# Patient Record
Sex: MALE | Race: WHITE | NOT HISPANIC OR LATINO | Employment: OTHER | URBAN - METROPOLITAN AREA
[De-identification: names, ages, dates, MRNs, and addresses within clinical notes are randomized per-mention and may not be internally consistent; named-entity substitution may affect disease eponyms.]

---

## 2017-03-31 RX ORDER — LIOTHYRONINE SODIUM 5 UG/1
25 TABLET ORAL DAILY
Qty: 30 TABLET | Refills: 2 | Status: SHIPPED | OUTPATIENT
Start: 2017-03-31 | End: 2017-07-12 | Stop reason: SDUPTHER

## 2017-03-31 RX ORDER — AMIODARONE HYDROCHLORIDE 200 MG/1
TABLET ORAL
Qty: 30 TABLET | Refills: 1 | OUTPATIENT
Start: 2017-03-31

## 2017-03-31 RX ORDER — LEVOTHYROXINE SODIUM 50 UG/1
50 TABLET ORAL DAILY
Qty: 30 TABLET | Refills: 1 | Status: SHIPPED | OUTPATIENT
Start: 2017-03-31 | End: 2017-06-14 | Stop reason: SDUPTHER

## 2017-03-31 NOTE — TELEPHONE ENCOUNTER
notify patient that his levothyroxine and Cytomel were sent in; will need to get his amiodarone however from his cardiologist as we don't prescribe that medication; he needs of schedule a follow-up appointment in the [facility] to get established here with us

## 2017-03-31 NOTE — TELEPHONE ENCOUNTER
----- Message from Jovita Figueroa sent at 3/31/2017 11:06 AM CDT -----  Contact: Carolin Mitchell  Wife called regarding a medication for the patient hasn't been sent to [facility]. Please contact [phone]

## 2017-04-03 NOTE — TELEPHONE ENCOUNTER
Spoke with pt and informed him that he needs to contact his Cardiologist ( Dr. Chase ) to get refill of Amiodarone.

## 2017-04-06 RX ORDER — METOPROLOL TARTRATE 25 MG/1
25 TABLET, FILM COATED ORAL DAILY
COMMUNITY
End: 2017-09-22 | Stop reason: SDUPTHER

## 2017-04-06 RX ORDER — CLOPIDOGREL BISULFATE 75 MG/1
75 TABLET ORAL DAILY
COMMUNITY

## 2017-04-06 RX ORDER — ASPIRIN 81 MG/1
243 TABLET ORAL DAILY
COMMUNITY
End: 2020-07-23 | Stop reason: ALTCHOICE

## 2017-04-06 RX ORDER — VALSARTAN 40 MG/1
40 TABLET ORAL DAILY
COMMUNITY
End: 2018-10-20

## 2017-04-06 RX ORDER — TAMSULOSIN HYDROCHLORIDE 0.4 MG/1
0.4 CAPSULE ORAL DAILY
COMMUNITY
End: 2017-06-21

## 2017-04-06 RX ORDER — SPIRONOLACTONE AND HYDROCHLOROTHIAZIDE 25; 25 MG/1; MG/1
0.5 TABLET ORAL DAILY
COMMUNITY
End: 2017-06-21

## 2017-04-06 RX ORDER — ATORVASTATIN CALCIUM 40 MG/1
40 TABLET, FILM COATED ORAL
COMMUNITY
End: 2021-03-25 | Stop reason: SDUPTHER

## 2017-04-06 RX ORDER — MAG HYDROX/ALUMINUM HYD/SIMETH 400-400-40
1 SUSPENSION, ORAL (FINAL DOSE FORM) ORAL 2 TIMES DAILY
COMMUNITY
End: 2017-06-21

## 2017-04-06 RX ORDER — ISOSORBIDE MONONITRATE 30 MG/1
15 TABLET, EXTENDED RELEASE ORAL DAILY
COMMUNITY
End: 2023-11-15

## 2017-04-06 RX ORDER — FOLIC ACID 0.8 MG
800 TABLET ORAL 2 TIMES DAILY
COMMUNITY

## 2017-04-06 RX ORDER — AMIODARONE HYDROCHLORIDE 200 MG/1
TABLET ORAL
COMMUNITY

## 2017-04-06 RX ORDER — ACETAMINOPHEN 500 MG
2000 TABLET ORAL EVERY OTHER DAY
COMMUNITY

## 2017-04-11 ENCOUNTER — PATIENT OUTREACH (OUTPATIENT)
Dept: ADMINISTRATIVE | Facility: HOSPITAL | Age: 82
End: 2017-04-11

## 2017-04-11 NOTE — LETTER
April 11, 2017    Shashi Hassan  [address]  [place] [address]             [facility]  [address]  [facility] [address]  Phone: [phone] Dear Mr. Hassan:    [facility] is committed to your overall health.  To help you get the most out of each of your visits, we will review your information to make sure you are up to date on all of your recommended tests and/or procedures.      Dr. Mitchell Mota is a new provider to us and we may not have your complete medical records on file here at [facility].  We have requested his records from the [facility].  The records we have received so far show that you may be due for your fasting cholesterol labs and possibly some immunizations (pneumonia, tetanus, and shingles).     If you have had any of the above done at another facility, please bring the records or information with you so that your record at [facility] will be complete.    If you are currently taking medication, please bring it with you to your appointment for review.    Also, if you have any type of Advanced Directives, please bring them with you to your office visit so we may scan them into your chart.    If you have any questions or concerns, please don't hesitate to call.    Thank you for letting us care for you,  Radha Bailey LPN Clinical Care Coordinator  [facility] [place]  [phone]

## 2017-05-05 ENCOUNTER — TELEPHONE (OUTPATIENT)
Dept: FAMILY MEDICINE | Facility: CLINIC | Age: 82
End: 2017-05-05

## 2017-05-05 NOTE — TELEPHONE ENCOUNTER
----- Message from Tamie Tabor sent at 5/5/2017 11:15 AM CDT -----  Contact: [employer]-   [phone]  Patient had ordered labs for ohmocysteine on 11/11/2016. The test was denied by medicare due to diagnosis code, the caller asking for new diagnosis code for the test.Thanks!

## 2017-05-10 NOTE — TELEPHONE ENCOUNTER
Please notify patient that Homocystine level was checked on the patient for elevated homocystine or homocystinemia; please have him call area code [phone] for further assistance, as I don't have any involvement with regards to the billing performed by the lab

## 2017-05-11 NOTE — TELEPHONE ENCOUNTER
Spoke with pt and informed him that he can come stop by the office and  a script with new dx to turn into [facility] and have them re-bill DOS 11/11/2016. Pt will come by tomorrow 05/12/2017.

## 2017-06-13 ENCOUNTER — TELEPHONE (OUTPATIENT)
Dept: FAMILY MEDICINE | Facility: CLINIC | Age: 82
End: 2017-06-13

## 2017-06-13 DIAGNOSIS — E03.9 HYPOTHYROIDISM, UNSPECIFIED TYPE: Primary | ICD-10-CM

## 2017-06-13 NOTE — TELEPHONE ENCOUNTER
----- Message from Ilsa Delgado sent at 6/13/2017  2:54 PM CDT -----  Contact: Wife  Amy, wife [phone], Calling for a refill of Rx levothyroxine (SYNTHROID) [facility] [address] . Patient is currently out of the medication.    [id number] [place] [address]  Phone: [phone] Fax: [phone]    Please advise. Thanks.

## 2017-06-14 RX ORDER — LEVOTHYROXINE SODIUM 50 UG/1
50 TABLET ORAL DAILY
Qty: 30 TABLET | Refills: 1 | Status: SHIPPED | OUTPATIENT
Start: 2017-06-14 | End: 2018-04-04 | Stop reason: SDUPTHER

## 2017-06-14 NOTE — TELEPHONE ENCOUNTER
Notify patient we sent in levothyroxine 50 µg daily but he needs to schedule appointment to get established and to be seen in [facility]

## 2017-06-14 NOTE — TELEPHONE ENCOUNTER
Pt is scheduled for follow up to get established at [facility] office on 06/21/2017.

## 2017-06-21 ENCOUNTER — OFFICE VISIT (OUTPATIENT)
Dept: FAMILY MEDICINE | Facility: CLINIC | Age: 82
End: 2017-06-21
Payer: MEDICARE

## 2017-06-21 VITALS
HEIGHT: 61 IN | DIASTOLIC BLOOD PRESSURE: 80 MMHG | BODY MASS INDEX: 33.36 KG/M2 | SYSTOLIC BLOOD PRESSURE: 126 MMHG | WEIGHT: 176.69 LBS | HEART RATE: 60 BPM | TEMPERATURE: 98 F

## 2017-06-21 DIAGNOSIS — I10 BENIGN ESSENTIAL HYPERTENSION: ICD-10-CM

## 2017-06-21 DIAGNOSIS — Z86.010 HX OF COLONIC POLYPS: ICD-10-CM

## 2017-06-21 DIAGNOSIS — Z95.5 S/P CORONARY ARTERY STENT PLACEMENT: ICD-10-CM

## 2017-06-21 DIAGNOSIS — N18.30 CKD (CHRONIC KIDNEY DISEASE), STAGE III: ICD-10-CM

## 2017-06-21 DIAGNOSIS — I47.20 VENTRICULAR TACHYCARDIA: ICD-10-CM

## 2017-06-21 DIAGNOSIS — E66.9 OBESITY (BMI 30.0-34.9): ICD-10-CM

## 2017-06-21 DIAGNOSIS — Z80.0 FAMILY HISTORY OF COLON CANCER: ICD-10-CM

## 2017-06-21 DIAGNOSIS — E78.2 MIXED HYPERLIPIDEMIA: Primary | ICD-10-CM

## 2017-06-21 DIAGNOSIS — Z95.1 S/P CABG X 3: ICD-10-CM

## 2017-06-21 DIAGNOSIS — E03.9 HYPOTHYROIDISM, UNSPECIFIED TYPE: ICD-10-CM

## 2017-06-21 DIAGNOSIS — R06.09 DOE (DYSPNEA ON EXERTION): ICD-10-CM

## 2017-06-21 PROBLEM — E66.811 OBESITY (BMI 30.0-34.9): Status: ACTIVE | Noted: 2017-06-21

## 2017-06-21 PROCEDURE — 1159F MED LIST DOCD IN RCRD: CPT | Mod: ,,, | Performed by: INTERNAL MEDICINE

## 2017-06-21 PROCEDURE — 99213 OFFICE O/P EST LOW 20 MIN: CPT | Mod: PBBFAC,PO | Performed by: INTERNAL MEDICINE

## 2017-06-21 PROCEDURE — 1126F AMNT PAIN NOTED NONE PRSNT: CPT | Mod: ,,, | Performed by: INTERNAL MEDICINE

## 2017-06-21 PROCEDURE — 99215 OFFICE O/P EST HI 40 MIN: CPT | Mod: S$PBB,,, | Performed by: INTERNAL MEDICINE

## 2017-06-21 PROCEDURE — 99999 PR PBB SHADOW E&M-EST. PATIENT-LVL III: CPT | Mod: PBBFAC,,, | Performed by: INTERNAL MEDICINE

## 2017-06-21 RX ORDER — AA/PROT/LYSINE/METHIO/VIT C/B6 50-12.5 MG
100 TABLET ORAL 2 TIMES DAILY
COMMUNITY
End: 2021-06-08 | Stop reason: DRUGHIGH

## 2017-06-21 NOTE — PROGRESS NOTES
Subjective:       Patient ID: Shashi Hassan is a 85 y.o. male.    Chief Complaint: Establish Care    HPI Pt is a  very pleasant 85-year-old gentleman established patient of mine here to get re-established with me at [facility]; please see last documented progress note available from [facility] clinic by me on 2/24/17; overall patient is doing better; his cardiologist is Dr. Garnica he had 2 coronary stents placed in the beginning of January to help his dyspnea on exertion; he saw some improvement but then subsequently wanted a third stent to be placed which after being placed showed a significant amount of improvement in his dyspnea on exertion after that; apparently the earlier 2 stents included the PDA and RCA; with third one including the  LAD; he also has a history of coronary artery bypass graft [facility] [place] 01/01/2009.  Running diagnosis's from the last office appointment included: Hyperlipidemia, history of ventricular tachycardia, essential hypertension, hypothyroidism, and chronic kidney disease stage III.  Patient's past medical history and surgical medical history were documented.  Social medical history and family medical history were obtained and delineated.  Review of systems was obtained at length, prior to physical exam being performed; appropriate labs ordered in follow-up.  Labs were also reviewed from 2/10/17 performed by [facility].  History was recently 1.0 with Dr. Osorio his urologist who he sees every 6 months.  Stool for globin was -5/14/16 of note    Review of Systems   Constitutional: Negative for appetite change, fever and unexpected weight change.   HENT: Negative for congestion, postnasal drip, rhinorrhea and sinus pressure.    Eyes: Negative for discharge and itching.   Respiratory: Positive for shortness of breath. Negative for cough, chest tightness and wheezing.         Markedly improved w coronary stents. In cardiac rehab now.  "  Cardiovascular: Negative for chest pain, palpitations and leg swelling.   Gastrointestinal: Negative for abdominal pain, blood in stool, constipation, diarrhea, nausea and vomiting.   Endocrine: Positive for polydipsia. Negative for polyphagia and polyuria.   Genitourinary: Negative for dysuria, hematuria and urgency.        BPH; nocturia 2x a night.   Musculoskeletal: Negative for arthralgias and myalgias.   Skin: Negative for rash.   Allergic/Immunologic: Negative for environmental allergies, food allergies and immunocompromised state.   Neurological: Negative for tremors, seizures and headaches.   Hematological: Negative for adenopathy. Does not bruise/bleed easily.   Psychiatric/Behavioral:        Denies anxiety or depression.       Objective:      Vitals:    06/21/17 1003   BP: 126/80   BP Location: Left arm   Patient Position: Sitting   BP Method: Manual   Pulse: 60   Temp: 98.2 °F (36.8 °C)   TempSrc: Oral   Weight: 80.2 kg (176 lb 11.2 oz)   Height: 5' 1" (1.549 m)     Body mass index is 33.39 kg/m².    Physical Exam   Constitutional: He is oriented to person, place, and time. He appears well-developed and well-nourished.   HENT:   Head: Normocephalic and atraumatic.   Eyes: EOM are normal.   Neck: Normal range of motion. Neck supple. No thyromegaly present.   No carotid bruits heard.   Cardiovascular: Normal rate, regular rhythm and normal heart sounds.  Exam reveals no gallop.    No murmur heard.  Pulmonary/Chest: Effort normal. No respiratory distress. He has no wheezes. He has no rales.   Slight reduced micaela BS's./ CTA     Abdominal: Soft. Bowel sounds are normal. He exhibits no distension. There is no tenderness. There is no rebound and no guarding.   Musculoskeletal: Normal range of motion. He exhibits edema.   1-2+ micaela LE edema; no calf tenderness to palp. Spider veins noted.   Lymphadenopathy:     He has no cervical adenopathy.   Neurological: He is alert and oriented to person, place, and time. "   Moves all 4 extremities fine.   Skin: No rash noted.   Psychiatric: He has a normal mood and affect. His behavior is normal. Thought content normal.   Vitals reviewed.      Assessment:       1. Mixed hyperlipidemia    2. Benign essential hypertension    3. CKD (chronic kidney disease), stage III    4. Hypothyroidism, unspecified type    5. S/P CABG x 3    6. S/P coronary artery stent placement    7. WESTFALL (dyspnea on exertion)    8. Ventricular tachycardia    9. Obesity (BMI 30.0-34.9)    10. Hx of colonic polyps        Plan:       Mixed hyperlipidemia. Maintain low fat high fiber diet, exercise regularly.  Continue atorvastatin dosing and omega-3 fatty acid    Benign essential hypertension. Maintain < 2 Gm Na a day diet, and monitor BP at home; keep a log.  On valsartan and metoprolol tartrate and isosorbide  -     CBC auto differential; Future; Expected date: 06/21/2017  -     Comprehensive metabolic panel; Future; Expected date: 06/21/2017  -     Urinalysis; Future; Expected date: 06/21/2017  -     Magnesium; Future; Expected date: 06/21/2017    CKD (chronic kidney disease), stage III; min 6-7 glasses of fluid a day. No NSAID's.  -     CBC auto differential; Future; Expected date: 06/21/2017  -     Comprehensive metabolic panel; Future; Expected date: 06/21/2017  -     Urinalysis; Future; Expected date: 06/21/2017    Hypothyroidism, unspecified type; thyroid function test before follow-up; on levothyroxine and Cytomel; patient is on amiodarone  -     TSH; Future; Expected date: 06/21/2017  -     T4, free; Future; Expected date: 06/21/2017  -     T3, free; Future; Expected date: 06/21/2017    S/P CABG x 3; performed at [facility] 1/1/2009    S/P coronary artery stent placement; marked improvement of patient's dyspnea on exertion with stenting of the LAD apparently in April, 2017; patient earlier in January had 2 coronary stents placed which showed only minor improvement in his WESTFALL    Ventricular  tachycardia; on amiodarone per cardiology; on amiodarone and metoprolol  -     Magnesium; Future; Expected date: 06/21/2017    Obesity (BMI 30.0-34.9); caloric restriction with regular exercise and weight reduction    History of colon polyps.  Last total colonoscopy July 2005; polyps removed at that time; 10 year  follow-up was recommended; due 2015; patient's mom with a history of colon cancer of note.  Requested a phone call to patient after review of his records to see if he ever had a colonoscopy performed in 2015 or since that time; it stool for occult blood updated

## 2017-06-21 NOTE — PATIENT INSTRUCTIONS
ST for his labs; obtain labs 10 days before f/u; overnight fast before labs are drawn.  Exercise w weight reduction. Cardiac rehab as per cardiology.

## 2017-06-22 RX ORDER — AMIODARONE HYDROCHLORIDE 200 MG/1
100 TABLET ORAL DAILY
Qty: 90 TABLET | Refills: 1 | OUTPATIENT
Start: 2017-06-22

## 2017-06-22 NOTE — TELEPHONE ENCOUNTER
Spoke with both patient and his wife. Let them both know that Dr. Mota stated that patients cardiologist is to fill his amiodarone. Wife voiced understanding.

## 2017-06-25 ENCOUNTER — TELEPHONE (OUTPATIENT)
Dept: FAMILY MEDICINE | Facility: CLINIC | Age: 82
End: 2017-06-25

## 2017-06-25 PROBLEM — Z86.010 HX OF COLONIC POLYPS: Status: ACTIVE | Noted: 2017-06-25

## 2017-06-25 PROBLEM — E78.2 MIXED HYPERLIPIDEMIA: Status: ACTIVE | Noted: 2017-06-25

## 2017-06-25 PROBLEM — Z86.0100 HX OF COLONIC POLYPS: Status: ACTIVE | Noted: 2017-06-25

## 2017-06-25 NOTE — TELEPHONE ENCOUNTER
Notify patient review of records shows that his last colonoscopy was July 2005; some polyps were removed at that time; a 10 year follow-up was recommended which would've been 2015; his mom remind him has a history of colon cancer; ask him if he has gotten his total colonoscopy in 2015 or is it still do..  Also in addition to his labs he needs to get stool for occult blood and updated

## 2017-06-26 NOTE — TELEPHONE ENCOUNTER
Spoke with pt and hes not gotten a colonoscopy was July 2005 and is aware that he does need to get updated colonoscopy.

## 2017-07-12 RX ORDER — LIOTHYRONINE SODIUM 5 UG/1
TABLET ORAL
Qty: 30 TABLET | Refills: 2 | Status: SHIPPED | OUTPATIENT
Start: 2017-07-12 | End: 2017-10-11 | Stop reason: SDUPTHER

## 2017-07-20 ENCOUNTER — PATIENT OUTREACH (OUTPATIENT)
Dept: ADMINISTRATIVE | Facility: HOSPITAL | Age: 82
End: 2017-07-20

## 2017-07-20 NOTE — LETTER
July 20, 2017    Shashi LIANG Sonya  [address]  [place] [address]             [facility]  [address]  [facility] [address]  Phone: [phone] Dear Mr. Hassan:    [facility] is committed to your overall health.  To help you get the most out of each of your visits, we will review your information to make sure you are up to date on all of your recommended tests and/or procedures.      Dr. Mitchell Mota has found that you may be due for your fasting cholesterol labs and possibly some immunizations (shingles, tetanus, and pneumonia).     Medicare does not cover all immunizations to be given in the clinic.  Check your benefits to ensure that you do not need to receive your immunizations at the pharmacy.    If you have had any of the above done at another facility, please bring the records or information with you so that your record at [facility] will be complete.  If you would like to schedule any of these, please contact me.    If you are currently taking medication, please bring it with you to your appointment for review.    Also, if you have any type of Advanced Directives, please bring them with you to your office visit so we may scan them into your chart.    If you have any questions or concerns, please don't hesitate to call.    Thank you for letting us care for you,  Radha Bailey LPN Clinical Care Coordinator  [facility]  [phone]

## 2017-09-08 ENCOUNTER — PATIENT OUTREACH (OUTPATIENT)
Dept: ADMINISTRATIVE | Facility: HOSPITAL | Age: 82
End: 2017-09-08

## 2017-09-08 NOTE — LETTER
September 8, 2017    Shashi LIANG Sonya  [address]  [place] [address]             [facility]  [address]  [facility] [address]  Phone: [phone] Dear Mr. Hassan:    [facility] committed to your overall health.  To help you get the most out of each of your visits, we will review your information to make sure you are up to date on all of your recommended tests and/or procedures.      Dr. Mitchell Mota has found that you may be due for your fasting cholesterol labs and possibly some immunizations (shingles, tetanus, and pneumonia).     Medicare does not cover all immunizations to be given in the clinic.  Check your benefits to ensure that you do not need to receive your immunizations at the pharmacy.    If you have had any of the above done at another facility, please bring the records or information with you so that your record at [facility] will be complete.  If you would like to schedule any of these, please contact me.    If you are currently taking medication, please bring it with you to your appointment for review.    Also, if you have any type of Advanced Directives, please bring them with you to your office visit so we may scan them into your chart.    If you have any questions or concerns, please don't hesitate to call.    Thank you for letting us care for you,  Radha Bailey LPN Clinical Care Coordinator  [facility] [place]  [phone]

## 2017-09-22 ENCOUNTER — OFFICE VISIT (OUTPATIENT)
Dept: FAMILY MEDICINE | Facility: CLINIC | Age: 82
End: 2017-09-22
Payer: MEDICARE

## 2017-09-22 VITALS
TEMPERATURE: 98 F | HEIGHT: 61 IN | OXYGEN SATURATION: 96 % | DIASTOLIC BLOOD PRESSURE: 75 MMHG | HEART RATE: 65 BPM | SYSTOLIC BLOOD PRESSURE: 120 MMHG | BODY MASS INDEX: 33.59 KG/M2 | WEIGHT: 177.94 LBS

## 2017-09-22 DIAGNOSIS — E78.2 MIXED HYPERLIPIDEMIA: Primary | ICD-10-CM

## 2017-09-22 DIAGNOSIS — E03.9 HYPOTHYROIDISM, UNSPECIFIED TYPE: ICD-10-CM

## 2017-09-22 DIAGNOSIS — I11.9 BENIGN HYPERTENSIVE HEART DISEASE WITHOUT HEART FAILURE: ICD-10-CM

## 2017-09-22 DIAGNOSIS — Z95.5 S/P CORONARY ARTERY STENT PLACEMENT: ICD-10-CM

## 2017-09-22 DIAGNOSIS — E66.9 OBESITY (BMI 30.0-34.9): ICD-10-CM

## 2017-09-22 DIAGNOSIS — Z95.1 S/P CABG X 3: ICD-10-CM

## 2017-09-22 DIAGNOSIS — Z23 FLU VACCINE NEED: ICD-10-CM

## 2017-09-22 DIAGNOSIS — N18.30 CKD (CHRONIC KIDNEY DISEASE), STAGE III: ICD-10-CM

## 2017-09-22 DIAGNOSIS — Z86.010 HX OF COLONIC POLYPS: ICD-10-CM

## 2017-09-22 PROCEDURE — 99214 OFFICE O/P EST MOD 30 MIN: CPT | Mod: PBBFAC,PN | Performed by: INTERNAL MEDICINE

## 2017-09-22 PROCEDURE — G0008 ADMIN INFLUENZA VIRUS VAC: HCPCS | Mod: PBBFAC,PN

## 2017-09-22 PROCEDURE — 1126F AMNT PAIN NOTED NONE PRSNT: CPT | Mod: ,,, | Performed by: INTERNAL MEDICINE

## 2017-09-22 PROCEDURE — 3074F SYST BP LT 130 MM HG: CPT | Mod: ,,, | Performed by: INTERNAL MEDICINE

## 2017-09-22 PROCEDURE — 99214 OFFICE O/P EST MOD 30 MIN: CPT | Mod: S$PBB,,, | Performed by: INTERNAL MEDICINE

## 2017-09-22 PROCEDURE — 3078F DIAST BP <80 MM HG: CPT | Mod: ,,, | Performed by: INTERNAL MEDICINE

## 2017-09-22 PROCEDURE — 1159F MED LIST DOCD IN RCRD: CPT | Mod: ,,, | Performed by: INTERNAL MEDICINE

## 2017-09-22 PROCEDURE — 99999 PR PBB SHADOW E&M-EST. PATIENT-LVL IV: CPT | Mod: PBBFAC,,, | Performed by: INTERNAL MEDICINE

## 2017-09-22 RX ORDER — METOPROLOL TARTRATE 25 MG/1
25 TABLET, FILM COATED ORAL DAILY
Qty: 90 TABLET | Refills: 1 | Status: SHIPPED | OUTPATIENT
Start: 2017-09-22 | End: 2018-04-04 | Stop reason: SDUPTHER

## 2017-09-22 NOTE — PATIENT INSTRUCTIONS
Mixed hyperlipidemia. Maintain low fat high fiber diet, exercise regularly. Decrease dairy intake; cont lipitor and fish oil.    Benign hypertensive heart disease without heart failure. Maintain < 2 Gm Na a day diet, and monitor BP at home; keep a log.     On valsartan, and metoprolol.    CKD (chronic kidney disease), stage III. Try to stay well hydrated; labs pending.    S/P CABG x 3; sees Dr Chase. Has been improved w WESTFALL since last stent.    S/P coronary artery stent placement    Hypothyroidism, unspecified type; levels TFT pending    Obesity (BMI 30.0-34.9)Caloric restriction w regular exercise and weight reduction.    Hx of colonic polyps; last TC 7/2005; few benign polyps removed; 10 yr f/u. Past due. With recent coronary stenting though will need to wait  at least 6 mos from April, and be approved by cardiology for timing thereafter. Needs iFOBT.    Needs to obtain labs from [facility]; overnight fast prior to labs; schedule follow up to discuss his labs.

## 2017-09-22 NOTE — PROGRESS NOTES
"Subjective:       Patient ID: Shashi Hassan is a 85 y.o. male.    Chief Complaint: Follow up labs    HPI  He's been doing fine. CAD w 3 stents: April this yr had stent placed, 2 in January; s/p 3V CABG. cardiologist Dr Garnica. No chest pain, SOB, or palp. Does get WESTFALL w a lot exertion; stable.   CKD3: tries to stay well hydrated. HLP: on low fat high fiber diet; tolerates atorvastatin fine. Meds reviewed. Labs discussed w pt and his wife. NMR lipid profile discussed; needs to obtain other labs not done unfortunately in computer. Cont same dosing of lipitor; needs to decrease dairy intake. Cont fish oil.    Review of Systems   Constitutional: Negative for appetite change, fever and unexpected weight change.   HENT: Negative for congestion, postnasal drip, rhinorrhea and sinus pressure.    Eyes: Negative for discharge and itching.   Respiratory: Negative for cough, chest tightness, shortness of breath and wheezing.         WESTFALL w a lot of exertion. Stable.   Cardiovascular: Negative for chest pain, palpitations and leg swelling.   Gastrointestinal: Negative for abdominal pain, blood in stool, constipation, diarrhea, nausea and vomiting.   Endocrine: Negative for polydipsia, polyphagia and polyuria.   Genitourinary: Negative for dysuria and hematuria.   Musculoskeletal: Negative for arthralgias and myalgias.   Skin: Negative for rash.   Allergic/Immunologic: Negative for environmental allergies and food allergies.   Neurological: Negative for tremors, seizures and headaches.   Hematological: Negative for adenopathy. Does not bruise/bleed easily.   Psychiatric/Behavioral:        Denies anxiety or depression.       Objective:      Vitals:    09/22/17 0925   BP: 120/75   BP Location: Left arm   Patient Position: Sitting   BP Method: Medium (Manual)   Pulse: 65   Temp: 97.7 °F (36.5 °C)   TempSrc: Oral   SpO2: 96%   Weight: 80.7 kg (177 lb 14.6 oz)   Height: 5' 1" (1.549 m)     Body mass index is 33.62 kg/m².    Physical " Exam   Constitutional: He is oriented to person, place, and time. He appears well-developed and well-nourished.   HENT:   Head: Normocephalic and atraumatic.   Eyes: EOM are normal.   Neck: Normal range of motion. Neck supple. No thyromegaly present.   No carotid bruits heard; mild/mod decrease ROM;    Cardiovascular: Normal rate, regular rhythm and normal heart sounds.  Exam reveals no gallop.    No murmur heard.  Pulmonary/Chest: Effort normal and breath sounds normal. No respiratory distress. He has no wheezes. He has no rales.   Abdominal: Soft. Bowel sounds are normal. He exhibits no distension. There is no tenderness. There is no rebound and no guarding.   Musculoskeletal: Normal range of motion. He exhibits edema.   LLE 1-2+; RLE tr-1+ edema; spider veins; no calf tenderness to palp.   Lymphadenopathy:     He has no cervical adenopathy.   Neurological: He is alert and oriented to person, place, and time.   Moves all 4 extremities fine.   Skin: No rash noted.   Psychiatric: He has a normal mood and affect. His behavior is normal. Thought content normal.   Vitals reviewed.      Assessment:       1. Mixed hyperlipidemia    2. Benign hypertensive heart disease without heart failure    3. CKD (chronic kidney disease), stage III    4. S/P CABG x 3    5. S/P coronary artery stent placement    6. Hypothyroidism, unspecified type    7. Obesity (BMI 30.0-34.9)    8. Hx of colonic polyps        Plan:       Mixed hyperlipidemia. Maintain low fat high fiber diet, exercise regularly. Decrease dairy intake; cont lipitor and fish oil.    Benign hypertensive heart disease without heart failure. Maintain < 2 Gm Na a day diet, and monitor BP at home; keep a log.     On valsartan, and metoprolol.    CKD (chronic kidney disease), stage III. Try to stay well hydrated; labs pending.    S/P CABG x 3; sees Dr Chase. Has been improved w WESTFALL since last stent.    S/P coronary artery stent placement    Hypothyroidism, unspecified type;  levels TFT pending    Obesity (BMI 30.0-34.9)Caloric restriction w regular exercise and weight reduction.    Hx of colonic polyps; last TC 7/2005; few benign polyps removed; 10 yr f/u. Past due. With recent coronary stenting though will need to wait  at least 6 mos from April, and be approved by cardiology for timing thereafter. Needs iFOBT.

## 2017-09-25 ENCOUNTER — TELEPHONE (OUTPATIENT)
Dept: FAMILY MEDICINE | Facility: CLINIC | Age: 82
End: 2017-09-25

## 2017-09-25 NOTE — TELEPHONE ENCOUNTER
----- Message from Kelly Cline sent at 9/25/2017 10:23 AM CDT -----  Contact: pt  Pt states would like to speak to nurse Altamirano concerning blood work done this morning...[phone]

## 2017-10-04 ENCOUNTER — PATIENT OUTREACH (OUTPATIENT)
Dept: ADMINISTRATIVE | Facility: HOSPITAL | Age: 82
End: 2017-10-04

## 2017-10-04 NOTE — LETTER
October 4, 2017    Shashi LIANG Sonya  [address]  [place] [address]             [facility]  [address]  [facility] [address]  Phone: [phone] Dear Mr. Hassan:    [facility] committed to your overall health.  To help you get the most out of each of your visits, we will review your information to make sure you are up to date on all of your recommended tests and/or procedures.      Dr. Mitchell Mota has found that your chart shows you may be due for your fasting cholesterol labs and possibly some immunizations (shingles, tetanus, and pneumonia).     Medicare does not cover all immunizations to be given in the clinic.  Check your benefits to ensure that you do not need to receive your immunizations at the pharmacy.    If you have had any of the above done at another facility, please bring the records or information with you so that your record at [facility] will be complete.  If you would like to schedule any of these, please contact me.    If you are currently taking medication, please bring it with you to your appointment for review.    Also, if you have any type of Advanced Directives, please bring them with you to your office visit so we may scan them into your chart.    If you have any questions or concerns, please don't hesitate to call.    Thank you for letting us care for you,  Radha Bailey LPN Clinical Care Coordinator  [facility] [place]  [phone]

## 2017-10-11 DIAGNOSIS — E03.9 HYPOTHYROIDISM, UNSPECIFIED TYPE: ICD-10-CM

## 2017-10-12 RX ORDER — LEVOTHYROXINE SODIUM 50 UG/1
TABLET ORAL
Qty: 30 TABLET | Refills: 2 | Status: SHIPPED | OUTPATIENT
Start: 2017-10-12 | End: 2018-01-30 | Stop reason: SDUPTHER

## 2017-10-12 RX ORDER — LIOTHYRONINE SODIUM 5 UG/1
TABLET ORAL
Qty: 30 TABLET | Refills: 2 | Status: SHIPPED | OUTPATIENT
Start: 2017-10-12 | End: 2018-01-25 | Stop reason: SDUPTHER

## 2017-10-12 NOTE — TELEPHONE ENCOUNTER
----- Message from Ghazal Mccoy sent at 10/12/2017 10:00 AM CDT -----  Contact: wife, Amy  Wife, of Shashi Hassan, [phone] is calling for refills on RX - requesting an early refill due to leaving on a cruise next week.  Rx levothyroxine (SYNTHROID) 50 MCG tablet, liothyronine (CYTOMEL) 5 MCG Tab,   Please advise.  Thanks!    [facility] [address] [place] [address] AT [facility] [address] [address]  Phone: [phone] Fax: [phone]

## 2017-10-17 ENCOUNTER — OFFICE VISIT (OUTPATIENT)
Dept: FAMILY MEDICINE | Facility: CLINIC | Age: 82
End: 2017-10-17
Payer: MEDICARE

## 2017-10-17 VITALS
TEMPERATURE: 98 F | WEIGHT: 177.69 LBS | SYSTOLIC BLOOD PRESSURE: 122 MMHG | HEIGHT: 61 IN | BODY MASS INDEX: 33.55 KG/M2 | OXYGEN SATURATION: 95 % | DIASTOLIC BLOOD PRESSURE: 85 MMHG | HEART RATE: 60 BPM

## 2017-10-17 DIAGNOSIS — E03.9 HYPOTHYROIDISM, UNSPECIFIED TYPE: ICD-10-CM

## 2017-10-17 DIAGNOSIS — R60.0 BILATERAL LEG EDEMA: ICD-10-CM

## 2017-10-17 DIAGNOSIS — Z95.1 S/P CABG X 3: ICD-10-CM

## 2017-10-17 DIAGNOSIS — E78.2 MIXED HYPERLIPIDEMIA: ICD-10-CM

## 2017-10-17 DIAGNOSIS — Z95.5 S/P CORONARY ARTERY STENT PLACEMENT: ICD-10-CM

## 2017-10-17 DIAGNOSIS — E66.9 OBESITY (BMI 30.0-34.9): ICD-10-CM

## 2017-10-17 DIAGNOSIS — I47.20 VENTRICULAR TACHYCARDIA: ICD-10-CM

## 2017-10-17 DIAGNOSIS — I11.9 BENIGN HYPERTENSIVE HEART DISEASE WITHOUT HEART FAILURE: Primary | ICD-10-CM

## 2017-10-17 DIAGNOSIS — N18.30 CKD (CHRONIC KIDNEY DISEASE), STAGE III: ICD-10-CM

## 2017-10-17 PROCEDURE — 99999 PR PBB SHADOW E&M-EST. PATIENT-LVL III: CPT | Mod: PBBFAC,,, | Performed by: INTERNAL MEDICINE

## 2017-10-17 PROCEDURE — 99214 OFFICE O/P EST MOD 30 MIN: CPT | Mod: S$PBB,,, | Performed by: INTERNAL MEDICINE

## 2017-10-17 PROCEDURE — 99213 OFFICE O/P EST LOW 20 MIN: CPT | Mod: PBBFAC,PN | Performed by: INTERNAL MEDICINE

## 2017-10-17 NOTE — PATIENT INSTRUCTIONS
Benign hypertensive heart disease without heart failure. Maintain < 2 Gm Na a day diet, and monitor BP at home; keep a log.     Cont valsartan, imdur, metoprolol.    CKD (chronic kidney disease), stage III. Keep well hydrated; no NSAID agents.    Mixed hyperlipidemia. Maintain low fat high fiber diet, exercise regularly. Cont atorvastain and fish oil.    Hypothyroidism, unspecified type; same thyroid med dosing; check TFT in 6 mos. On amiodarone.    S/P CABG x 3; see Dr Chase. On plavix/ASA, and metoprolo.  S/P coronary artery stent placement    Ventricular tachycardia; has been stable;on amiodarone.    Saroj LE edema; suspect from laura. Insufficiency. Maintain less than 2 g sodium diet; elevate lower extremities at home;        consider use compression stockings if possible.    Obesity (BMI 30.0-34.9). Caloric restriction w regular exercise and weight reduction.    Return to see me in 6 mos w labs prior; overnight fast before his labs are drawn.

## 2017-10-17 NOTE — PROGRESS NOTES
"Subjective:       Patient ID: Shashi Hassan is a 86 y.o. male.    Chief Complaint: Follow up labs    HPI  Overall doing fine; here to go over his labs; preparing for upcoming cruise...HTN: BP avr about the same as here; 122/85; watches his salt intake. CKD3: trying to stay hydrated. Knows not to use NSAID's. Mix HLP: on low fat high fiber diet. Tolerates atorvastatin fine. Takes thyroid meds appropriately; on amiodarone of note; on levothyroxine and cytomel. Hx CAD/CABG/stent x3: no chest pain, SOB, or palp. Card sees Dr Chase. Sees BONNIE Ramos for his PSA, and JAI's.    Review of Systems   Constitutional: Negative for appetite change and unexpected weight change.   HENT: Negative for congestion, postnasal drip and sinus pressure.    Eyes: Negative for discharge and itching.   Respiratory: Negative for cough, chest tightness, shortness of breath and wheezing.    Cardiovascular: Negative for chest pain, palpitations and leg swelling.   Gastrointestinal: Negative for abdominal pain, blood in stool, constipation, diarrhea, nausea and vomiting.   Endocrine: Negative for polydipsia, polyphagia and polyuria.   Genitourinary: Negative for dysuria and hematuria.   Musculoskeletal: Negative for arthralgias and myalgias.   Skin: Negative for rash.   Allergic/Immunologic: Negative for environmental allergies and food allergies.   Neurological: Negative for tremors, seizures and headaches.   Hematological: Negative for adenopathy. Does not bruise/bleed easily.   Psychiatric/Behavioral:        Denies anxiety or depression.       Objective:      Vitals:    10/17/17 0955   BP: 122/85   BP Location: Right arm   Patient Position: Sitting   BP Method: Medium (Manual)   Pulse: 60   Temp: 97.9 °F (36.6 °C)   TempSrc: Oral   SpO2: 95%   Weight: 80.6 kg (177 lb 11.1 oz)   Height: 5' 1" (1.549 m)     Body mass index is 33.57 kg/m².    Physical Exam   Constitutional: He is oriented to person, place, and time. He appears well-developed " and well-nourished.   HENT:   Head: Normocephalic and atraumatic.   Eyes: EOM are normal.   Neck: Normal range of motion. Neck supple. No thyromegaly present.   Decreased ROM neck; supple; no carotid bruits heard.   Cardiovascular: Normal rate, regular rhythm and normal heart sounds.  Exam reveals no gallop.    No murmur heard.  Pulmonary/Chest: Effort normal and breath sounds normal. No respiratory distress. He has no wheezes. He has no rales.   Abdominal: Soft. Bowel sounds are normal. He exhibits no distension. There is no tenderness. There is no rebound and no guarding.   Musculoskeletal: Normal range of motion. He exhibits edema.   2+ micaela LE edema; no calf tenderness to palp. Spider veins noted.   Lymphadenopathy:     He has no cervical adenopathy.   Neurological: He is alert and oriented to person, place, and time.   Moves all 4 extremities fine.   Skin: No rash noted.   Psychiatric: He has a normal mood and affect. His behavior is normal. Thought content normal.   Vitals reviewed.      Assessment:       1. 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 CKD (chronic kidney disease), stage III    3. Mixed hyperlipidemia    4. Hypothyroidism, unspecified type    5. S/P CABG x 3    6. S/P coronary artery stent placement    7. Ventricular tachycardia    8. Bilateral leg edema    9. Obesity (BMI 30.0-34.9)        Plan:       Benign hypertensive heart disease without heart failure. Maintain < 2 Gm Na a day diet, and monitor BP at home; keep a log.     Cont valsartan, imdur, metoprolol.    CKD (chronic kidney disease), stage III. Keep well hydrated; no NSAID agents.    Mixed hyperlipidemia. Maintain low fat high fiber diet, exercise regularly. Cont atorvastain and fish oil.    Hypothyroidism, unspecified type; same thyroid med dosing; check TFT in 6 mos. On amiodarone.    S/P CABG x 3; see Dr Chase. On plavix/ASA, and metoprolo.  S/P coronary artery stent placement    Ventricular tachycardia; has been  stable;on amiodarone.    Saroj LE edema; suspect from laura. Insufficiency. Maintain less than 2 g sodium diet; elevate lower extremities at home;        consider use compression stockings if possible.    Obesity (BMI 30.0-34.9). Caloric restriction w regular exercise and weight reduction.

## 2018-01-25 RX ORDER — LIOTHYRONINE SODIUM 5 UG/1
TABLET ORAL
Qty: 30 TABLET | Refills: 2 | Status: SHIPPED | OUTPATIENT
Start: 2018-01-25 | End: 2018-05-07 | Stop reason: SDUPTHER

## 2018-01-31 RX ORDER — LEVOTHYROXINE SODIUM 50 UG/1
TABLET ORAL
Qty: 30 TABLET | Refills: 2 | Status: SHIPPED | OUTPATIENT
Start: 2018-01-31 | End: 2018-05-14 | Stop reason: SDUPTHER

## 2018-03-21 ENCOUNTER — PATIENT OUTREACH (OUTPATIENT)
Dept: ADMINISTRATIVE | Facility: HOSPITAL | Age: 83
End: 2018-03-21

## 2018-03-21 NOTE — LETTER
March 21, 2018    Shashi Hassan  [address]  [place] [address]             [facility]  [address]  [facility] [address]  Phone: [phone] Dear Mr. Hassan:    [facility] is committed to your overall health.  To help you get the most out of each of your visits, we will review your information to make sure you are up to date on all of your recommended tests and/or procedures.      Dr. Mitchell Mota has found that your chart shows you may be due for shingles, tetanus, and pneumonia immunizations.     Medicare does not cover all immunizations to be given in the clinic.  Check your benefits to ensure that you do not need to receive your immunizations at the pharmacy.    If you have had any of the above done at another facility, please bring the records or information with you so that your record at [facility] will be complete.  If you would like to schedule any of these, please contact me.    If you are currently taking medication, please bring it with you to your appointment for review.    Also, if you have any type of Advanced Directives, please bring them with you to your office visit so we may scan them into your chart.    If you have any questions or concerns, please don't hesitate to call.    Thank you for letting us care for you,  Radha Bailey LPN Clinical Care Coordinator  [facility]  [phone]

## 2018-03-23 ENCOUNTER — LAB VISIT (OUTPATIENT)
Dept: LAB | Facility: HOSPITAL | Age: 83
End: 2018-03-23
Attending: INTERNAL MEDICINE
Payer: MEDICARE

## 2018-03-23 DIAGNOSIS — E03.9 HYPOTHYROIDISM, UNSPECIFIED TYPE: ICD-10-CM

## 2018-03-23 DIAGNOSIS — I11.9 BENIGN HYPERTENSIVE HEART DISEASE WITHOUT HEART FAILURE: ICD-10-CM

## 2018-03-23 DIAGNOSIS — N18.30 CKD (CHRONIC KIDNEY DISEASE), STAGE III: ICD-10-CM

## 2018-03-23 DIAGNOSIS — I47.20 VENTRICULAR TACHYCARDIA: ICD-10-CM

## 2018-03-23 DIAGNOSIS — E78.2 MIXED HYPERLIPIDEMIA: ICD-10-CM

## 2018-03-23 LAB
ALBUMIN SERPL BCP-MCNC: 4 G/DL
ALP SERPL-CCNC: 63 U/L
ALT SERPL W/O P-5'-P-CCNC: 26 U/L
ANION GAP SERPL CALC-SCNC: 7 MMOL/L
AST SERPL-CCNC: 25 U/L
BASOPHILS # BLD AUTO: 0.03 K/UL
BASOPHILS NFR BLD: 0.5 %
BILIRUB SERPL-MCNC: 0.5 MG/DL
BUN SERPL-MCNC: 25 MG/DL
CALCIUM SERPL-MCNC: 9.6 MG/DL
CHLORIDE SERPL-SCNC: 104 MMOL/L
CHOLEST SERPL-MCNC: 118 MG/DL
CHOLEST/HDLC SERPL: 3 {RATIO}
CO2 SERPL-SCNC: 28 MMOL/L
CREAT SERPL-MCNC: 1.6 MG/DL
DIFFERENTIAL METHOD: NORMAL
EOSINOPHIL # BLD AUTO: 0.1 K/UL
EOSINOPHIL NFR BLD: 1.2 %
ERYTHROCYTE [DISTWIDTH] IN BLOOD BY AUTOMATED COUNT: 13 %
EST. GFR  (AFRICAN AMERICAN): 44.4 ML/MIN/1.73 M^2
EST. GFR  (NON AFRICAN AMERICAN): 38.4 ML/MIN/1.73 M^2
GLUCOSE SERPL-MCNC: 105 MG/DL
HCT VFR BLD AUTO: 42.4 %
HDLC SERPL-MCNC: 40 MG/DL
HDLC SERPL: 33.9 %
HGB BLD-MCNC: 14 G/DL
IMM GRANULOCYTES # BLD AUTO: 0.02 K/UL
IMM GRANULOCYTES NFR BLD AUTO: 0.3 %
LDLC SERPL CALC-MCNC: 54.2 MG/DL
LYMPHOCYTES # BLD AUTO: 2.1 K/UL
LYMPHOCYTES NFR BLD: 31.5 %
MAGNESIUM SERPL-MCNC: 2.2 MG/DL
MCH RBC QN AUTO: 29.5 PG
MCHC RBC AUTO-ENTMCNC: 33 G/DL
MCV RBC AUTO: 90 FL
MONOCYTES # BLD AUTO: 0.6 K/UL
MONOCYTES NFR BLD: 8.9 %
NEUTROPHILS # BLD AUTO: 3.8 K/UL
NEUTROPHILS NFR BLD: 57.6 %
NONHDLC SERPL-MCNC: 78 MG/DL
NRBC BLD-RTO: 0 /100 WBC
PLATELET # BLD AUTO: 228 K/UL
PMV BLD AUTO: 10.2 FL
POTASSIUM SERPL-SCNC: 4.7 MMOL/L
PROT SERPL-MCNC: 7.3 G/DL
RBC # BLD AUTO: 4.74 M/UL
SODIUM SERPL-SCNC: 139 MMOL/L
T3FREE SERPL-MCNC: 3.4 PG/ML
T4 FREE SERPL-MCNC: 1.03 NG/DL
TRIGL SERPL-MCNC: 119 MG/DL
TSH SERPL DL<=0.005 MIU/L-ACNC: 1.55 UIU/ML
WBC # BLD AUTO: 6.51 K/UL

## 2018-03-23 PROCEDURE — 85025 COMPLETE CBC W/AUTO DIFF WBC: CPT

## 2018-03-23 PROCEDURE — 80053 COMPREHEN METABOLIC PANEL: CPT

## 2018-03-23 PROCEDURE — 36415 COLL VENOUS BLD VENIPUNCTURE: CPT | Mod: PN

## 2018-03-23 PROCEDURE — 84481 FREE ASSAY (FT-3): CPT

## 2018-03-23 PROCEDURE — 84443 ASSAY THYROID STIM HORMONE: CPT

## 2018-03-23 PROCEDURE — 80061 LIPID PANEL: CPT

## 2018-03-23 PROCEDURE — 84439 ASSAY OF FREE THYROXINE: CPT

## 2018-03-23 PROCEDURE — 83735 ASSAY OF MAGNESIUM: CPT

## 2018-04-04 ENCOUNTER — OFFICE VISIT (OUTPATIENT)
Dept: FAMILY MEDICINE | Facility: CLINIC | Age: 83
End: 2018-04-04
Payer: MEDICARE

## 2018-04-04 VITALS
OXYGEN SATURATION: 96 % | WEIGHT: 179.25 LBS | BODY MASS INDEX: 33.84 KG/M2 | TEMPERATURE: 98 F | DIASTOLIC BLOOD PRESSURE: 80 MMHG | HEART RATE: 62 BPM | HEIGHT: 61 IN | SYSTOLIC BLOOD PRESSURE: 138 MMHG

## 2018-04-04 DIAGNOSIS — E78.2 MIXED HYPERLIPIDEMIA: ICD-10-CM

## 2018-04-04 DIAGNOSIS — Z95.5 S/P CORONARY ARTERY STENT PLACEMENT: ICD-10-CM

## 2018-04-04 DIAGNOSIS — I47.20 VENTRICULAR TACHYCARDIA: ICD-10-CM

## 2018-04-04 DIAGNOSIS — E66.9 OBESITY (BMI 30.0-34.9): ICD-10-CM

## 2018-04-04 DIAGNOSIS — I11.9 BENIGN HYPERTENSIVE HEART DISEASE WITHOUT HEART FAILURE: Primary | ICD-10-CM

## 2018-04-04 DIAGNOSIS — Z95.1 S/P CABG X 3: ICD-10-CM

## 2018-04-04 DIAGNOSIS — E03.9 HYPOTHYROIDISM, UNSPECIFIED TYPE: ICD-10-CM

## 2018-04-04 DIAGNOSIS — N13.8 BENIGN PROSTATIC HYPERPLASIA WITH URINARY OBSTRUCTION: ICD-10-CM

## 2018-04-04 DIAGNOSIS — N18.30 CKD (CHRONIC KIDNEY DISEASE), STAGE III: ICD-10-CM

## 2018-04-04 DIAGNOSIS — N40.1 BENIGN PROSTATIC HYPERPLASIA WITH URINARY OBSTRUCTION: ICD-10-CM

## 2018-04-04 PROCEDURE — 99214 OFFICE O/P EST MOD 30 MIN: CPT | Mod: S$PBB,,, | Performed by: INTERNAL MEDICINE

## 2018-04-04 PROCEDURE — 99999 PR PBB SHADOW E&M-EST. PATIENT-LVL III: CPT | Mod: PBBFAC,,, | Performed by: INTERNAL MEDICINE

## 2018-04-04 PROCEDURE — 99213 OFFICE O/P EST LOW 20 MIN: CPT | Mod: PBBFAC,PN | Performed by: INTERNAL MEDICINE

## 2018-04-04 RX ORDER — METOPROLOL TARTRATE 25 MG/1
25 TABLET, FILM COATED ORAL DAILY
Qty: 90 TABLET | Refills: 1 | Status: SHIPPED | OUTPATIENT
Start: 2018-04-04 | End: 2018-08-27 | Stop reason: SDUPTHER

## 2018-04-04 NOTE — PROGRESS NOTES
"Subjective:       Patient ID: Shashi Hassan is a 86 y.o. male.    Chief Complaint: Follow-up (labs)    HPI  HTN: BP avr at home about the same as here 138/80; on isosorbide, metoprolol, and valsartan. CAD: no chest pain, SOB, or palp. CABG x3 in 2008; total 3 stents placed after on 2 occasions. Card is Dr Chase. Hx of VT; 1 of 2 weeks of heart monitor completed. On amiodarone. HLP: on low fat high fiber diet; on omega-3 FA's, and atorvastatin. Hypothyroid: takes his meds appropriately. Labs discussed w pt and wife at length. Just saw Dr Estephania NICOLE; had JAI w hx of prostate enlargement; 1 yr f/u given. Follows his PSA.     Review of Systems   Constitutional: Negative for appetite change, fatigue and unexpected weight change.   HENT: Negative for congestion, postnasal drip, rhinorrhea and sinus pressure.         Denies seasonal allergies, or perennial allergies   Eyes: Negative for discharge and itching.   Respiratory: Negative for cough, chest tightness, shortness of breath and wheezing.    Cardiovascular: Negative for chest pain, palpitations and leg swelling.   Gastrointestinal: Negative for abdominal pain, blood in stool, constipation, diarrhea, nausea and vomiting.   Endocrine: Negative for polydipsia, polyphagia and polyuria.   Genitourinary: Negative for dysuria and hematuria.   Musculoskeletal: Negative for arthralgias and myalgias.   Skin: Negative for rash.   Allergic/Immunologic: Negative for environmental allergies and food allergies.   Neurological: Negative for tremors, seizures and headaches.   Hematological: Negative for adenopathy. Does not bruise/bleed easily.   Psychiatric/Behavioral:        Denies anxiety or depression.       Objective:      Vitals:    04/04/18 1009   BP: 138/80   BP Location: Left arm   Patient Position: Sitting   BP Method: Medium (Manual)   Pulse: 62   Temp: 98 °F (36.7 °C)   TempSrc: Oral   SpO2: 96%   Weight: 81.3 kg (179 lb 3.7 oz)   Height: 5' 1" (1.549 m)     Body mass " index is 33.87 kg/m².    Physical Exam   Constitutional: He is oriented to person, place, and time. He appears well-developed and well-nourished.   HENT:   Head: Normocephalic and atraumatic.   Eyes: EOM are normal.   Neck: Normal range of motion. Neck supple. No thyromegaly present.   Cardiovascular: Normal rate, regular rhythm and normal heart sounds.  Exam reveals no gallop.    No murmur heard.  Pulmonary/Chest: Effort normal and breath sounds normal. No respiratory distress. He has no wheezes. He has no rales.   Abdominal: Soft. Bowel sounds are normal. He exhibits no distension. There is no tenderness. There is no rebound and no guarding.   Musculoskeletal: Normal range of motion. He exhibits edema.   LLE 2+ edema; RLE 1-2+ edema; spider veins micaela; no calf tenderness to palp.   Lymphadenopathy:     He has no cervical adenopathy.   Neurological: He is alert and oriented to person, place, and time.   Moves all 4 extremities fine.   Skin: No rash noted.   Psychiatric: He has a normal mood and affect. His behavior is normal. Thought content normal.   Vitals reviewed.      Assessment:       1. 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 CKD (chronic kidney disease), stage III    3. Mixed hyperlipidemia    4. Hypothyroidism, unspecified type    5. S/P CABG x 3    6. S/P coronary artery stent placement    7. Ventricular tachycardia    8. Obesity (BMI 30.0-34.9)    9. Benign prostatic hyperplasia with urinary obstruction        Plan:       Benign hypertensive heart disease without heart failure  -     metoprolol tartrate (LOPRESSOR) 25 MG tablet; Take 1 tablet (25 mg total) by mouth once daily.  Dispense: 90 tablet; Refill: 1  -     Ambulatory consult to Nephrology    CKD (chronic kidney disease), stage III; has worsened on f/u; GFR 49 to 38.4; cr 1.32 to 1.6; push fluids; no NSAID agents  -     Ambulatory consult to Nephrology dr JAY Swift.    BPH suspected; sees BONNIE Best; please try to get  results from recent US done by BONNIE in his office. Follows his PSA; f/u 3/2019. On tamsulosin.    Mixed hyperlipidemia. Maintain low fat high fiber diet, exercise regularly. Therapeutic levels. On atorvastatin.     Hypothyroidism, unspecified type; levels therapeutic. TFT in 6 mos. Same dosing.    S/P CABG x 3; sees Dr Chase.   -     metoprolol tartrate (LOPRESSOR) 25 MG tablet; Take 1 tablet (25 mg total) by mouth once daily.  Dispense: 90 tablet; Refill: 1  -     Ambulatory consult to Nephrology    S/P coronary artery stent placement  -     metoprolol tartrate (LOPRESSOR) 25 MG tablet; Take 1 tablet (25 mg total) by mouth once daily.  Dispense: 90 tablet; Refill: 1  -     Ambulatory consult to Nephrology    Ventricular tachycardia; 2 week monitor in progress by Dr Chase; keep his f/u; no sx's so far on monitor.    Obesity (BMI 30.0-34.9). Caloric restriction w regular exercise and weight reduction.

## 2018-04-04 NOTE — PATIENT INSTRUCTIONS
Benign hypertensive heart disease without heart failure  -     metoprolol tartrate (LOPRESSOR) 25 MG tablet; Take 1 tablet (25 mg total) by mouth once daily.  Dispense: 90 tablet; Refill: 1  -     Ambulatory consult to Nephrology    CKD (chronic kidney disease), stage III; has worsened on f/u; GFR 49 to 38.4; cr 1.32 to 1.6; push fluids; no NSAID agents  -     Ambulatory consult to Nephrology dr JAY Swift.    BPH suspected; sees BONNIE Best; please try to get results from recent US done by BONNIE in his office. Follows his PSA; f/u 3/2019. On tamsulosin.    Mixed hyperlipidemia. Maintain low fat high fiber diet, exercise regularly. Therapeutic levels. On atorvastatin.     Hypothyroidism, unspecified type; levels therapeutic. TFT in 6 mos. Same dosing.    S/P CABG x 3; sees Dr Chase.   -     metoprolol tartrate (LOPRESSOR) 25 MG tablet; Take 1 tablet (25 mg total) by mouth once daily.  Dispense: 90 tablet; Refill: 1  -     Ambulatory consult to Nephrology    S/P coronary artery stent placement  -     metoprolol tartrate (LOPRESSOR) 25 MG tablet; Take 1 tablet (25 mg total) by mouth once daily.  Dispense: 90 tablet; Refill: 1  -     Ambulatory consult to Nephrology    Ventricular tachycardia; 2 week monitor in progress by Dr Chase; keep his f/u; no sx's so far on monitor.    Obesity (BMI 30.0-34.9). Caloric restriction w regular exercise and weight reduction.

## 2018-04-18 ENCOUNTER — LAB VISIT (OUTPATIENT)
Dept: LAB | Facility: HOSPITAL | Age: 83
End: 2018-04-18
Attending: INTERNAL MEDICINE
Payer: MEDICARE

## 2018-04-18 ENCOUNTER — OFFICE VISIT (OUTPATIENT)
Dept: NEPHROLOGY | Facility: CLINIC | Age: 83
End: 2018-04-18
Payer: MEDICARE

## 2018-04-18 VITALS
HEART RATE: 61 BPM | DIASTOLIC BLOOD PRESSURE: 56 MMHG | BODY MASS INDEX: 33.88 KG/M2 | SYSTOLIC BLOOD PRESSURE: 100 MMHG | HEIGHT: 61 IN | OXYGEN SATURATION: 95 % | WEIGHT: 179.44 LBS

## 2018-04-18 DIAGNOSIS — R73.9 HYPERGLYCEMIA: ICD-10-CM

## 2018-04-18 DIAGNOSIS — N18.30 CKD (CHRONIC KIDNEY DISEASE) STAGE 3, GFR 30-59 ML/MIN: ICD-10-CM

## 2018-04-18 DIAGNOSIS — N18.30 CKD (CHRONIC KIDNEY DISEASE), STAGE III: ICD-10-CM

## 2018-04-18 DIAGNOSIS — N18.30 CKD (CHRONIC KIDNEY DISEASE) STAGE 3, GFR 30-59 ML/MIN: Primary | ICD-10-CM

## 2018-04-18 LAB
ALBUMIN SERPL BCP-MCNC: 3.8 G/DL
ANION GAP SERPL CALC-SCNC: 10 MMOL/L
BUN SERPL-MCNC: 22 MG/DL
CALCIUM SERPL-MCNC: 10.2 MG/DL
CHLORIDE SERPL-SCNC: 102 MMOL/L
CO2 SERPL-SCNC: 27 MMOL/L
CREAT SERPL-MCNC: 1.5 MG/DL
EST. GFR  (AFRICAN AMERICAN): 48 ML/MIN/1.73 M^2
EST. GFR  (NON AFRICAN AMERICAN): 41.6 ML/MIN/1.73 M^2
ESTIMATED AVG GLUCOSE: 108 MG/DL
GLUCOSE SERPL-MCNC: 94 MG/DL
HBA1C MFR BLD HPLC: 5.4 %
PHOSPHATE SERPL-MCNC: 3.7 MG/DL
POTASSIUM SERPL-SCNC: 4.5 MMOL/L
SODIUM SERPL-SCNC: 139 MMOL/L

## 2018-04-18 PROCEDURE — 86038 ANTINUCLEAR ANTIBODIES: CPT

## 2018-04-18 PROCEDURE — 86803 HEPATITIS C AB TEST: CPT

## 2018-04-18 PROCEDURE — 99213 OFFICE O/P EST LOW 20 MIN: CPT | Mod: PBBFAC,PO | Performed by: INTERNAL MEDICINE

## 2018-04-18 PROCEDURE — 83036 HEMOGLOBIN GLYCOSYLATED A1C: CPT

## 2018-04-18 PROCEDURE — 36415 COLL VENOUS BLD VENIPUNCTURE: CPT | Mod: PO

## 2018-04-18 PROCEDURE — 84165 PROTEIN E-PHORESIS SERUM: CPT

## 2018-04-18 PROCEDURE — 80069 RENAL FUNCTION PANEL: CPT

## 2018-04-18 PROCEDURE — 99999 PR PBB SHADOW E&M-EST. PATIENT-LVL III: CPT | Mod: PBBFAC,,, | Performed by: INTERNAL MEDICINE

## 2018-04-18 PROCEDURE — 99203 OFFICE O/P NEW LOW 30 MIN: CPT | Mod: S$PBB,,, | Performed by: INTERNAL MEDICINE

## 2018-04-18 PROCEDURE — 87340 HEPATITIS B SURFACE AG IA: CPT

## 2018-04-18 PROCEDURE — 84165 PROTEIN E-PHORESIS SERUM: CPT | Mod: 26,,, | Performed by: PATHOLOGY

## 2018-04-18 RX ORDER — TAMSULOSIN HYDROCHLORIDE 0.4 MG/1
0.4 CAPSULE ORAL DAILY
COMMUNITY

## 2018-04-18 RX ORDER — SPIRONOLACTONE 25 MG/1
12.5 TABLET ORAL DAILY
COMMUNITY
End: 2018-10-10

## 2018-04-18 NOTE — Clinical Note
April 22, 2018      Mitchell Mota MD  [address]  [place] [address]           [facility] [address] [facility] [address] [place] [address]  Phone: [phone]          Patient: Shashi Hassan   MR Number: [id number]   YOB: 1931   Date of Visit: 4/18/2018       Dear Dr. Mitchell Mota:    Thank you for referring Shashi Hassan to me for evaluation. Attached you will find relevant portions of my assessment and plan of care.    If you have questions, please do not hesitate to call me. I look forward to following Shashi Hassan along with you.    Sincerely,    Tony Persaud MD    Enclosure  CC:  No Recipients    If you would like to receive this communication electronically, please contact [email or web address] or [phone] to request more information on [employer] access.    For providers and/or their staff who would like to refer a patient to [facility], please contact us through our one-stop-shop provider referral line, [facility], at [phone].    If you feel you have received this communication in error or would no longer like to receive these types of communications, please e-mail [email or web address]

## 2018-04-19 LAB
ALBUMIN SERPL ELPH-MCNC: 4.17 G/DL
ALPHA1 GLOB SERPL ELPH-MCNC: 0.26 G/DL
ALPHA2 GLOB SERPL ELPH-MCNC: 0.75 G/DL
ANA SER QL IF: NORMAL
B-GLOBULIN SERPL ELPH-MCNC: 0.84 G/DL
GAMMA GLOB SERPL ELPH-MCNC: 1.07 G/DL
HBV SURFACE AG SERPL QL IA: NEGATIVE
HCV AB SERPL QL IA: NEGATIVE
PATHOLOGIST INTERPRETATION SPE: NORMAL
PROT SERPL-MCNC: 7.1 G/DL

## 2018-04-20 ENCOUNTER — TELEPHONE (OUTPATIENT)
Dept: NEPHROLOGY | Facility: CLINIC | Age: 83
End: 2018-04-20

## 2018-04-20 DIAGNOSIS — N18.30 CKD (CHRONIC KIDNEY DISEASE) STAGE 3, GFR 30-59 ML/MIN: Primary | ICD-10-CM

## 2018-04-20 DIAGNOSIS — E55.9 VITAMIN D DEFICIENCY: ICD-10-CM

## 2018-04-20 NOTE — TELEPHONE ENCOUNTER
Please inform pt that:    1. Screen for Diabetes is normal  2. Hepatitis B and C screen is normal  3. Screen for lupus and other autoimmune diseases is normal  4. His body is not producing abnormal proteins that can get stuck in the kidneys  5. Urine sample is fine--normal protein  6. Kidney function looks a little better than previous    Based on these results the cause of his mild kidney damage seems to be from age and heart disease.    He will be due back for a kidney check up in 4 months from now with labs prior, orders in. Thank you

## 2018-04-23 NOTE — PROGRESS NOTES
Subjective:       Patient ID: Shashi Hassan is a 86 y.o. White male who presents for new evaluation of Chronic Kidney Disease    HPI  Review of Systems    Objective:      Physical Exam    Assessment:       1. CKD (chronic kidney disease) stage 3, GFR 30-59 ml/min    2. Hyperglycemia    3. CKD (chronic kidney disease), stage III        Plan:

## 2018-05-07 RX ORDER — LIOTHYRONINE SODIUM 5 UG/1
TABLET ORAL
Qty: 90 TABLET | Refills: 1 | Status: SHIPPED | OUTPATIENT
Start: 2018-05-07 | End: 2018-11-15 | Stop reason: SDUPTHER

## 2018-05-15 RX ORDER — LEVOTHYROXINE SODIUM 50 UG/1
TABLET ORAL
Qty: 90 TABLET | Refills: 1 | Status: SHIPPED | OUTPATIENT
Start: 2018-05-15 | End: 2018-11-15 | Stop reason: SDUPTHER

## 2018-06-28 ENCOUNTER — LAB VISIT (OUTPATIENT)
Dept: LAB | Facility: HOSPITAL | Age: 83
End: 2018-06-28
Attending: INTERNAL MEDICINE
Payer: MEDICARE

## 2018-06-28 DIAGNOSIS — N17.9 AKI (ACUTE KIDNEY INJURY): Primary | ICD-10-CM

## 2018-06-28 DIAGNOSIS — I47.20 VENTRICULAR TACHYCARDIA: ICD-10-CM

## 2018-06-28 DIAGNOSIS — I11.9 BENIGN HYPERTENSIVE HEART DISEASE WITHOUT HEART FAILURE: ICD-10-CM

## 2018-06-28 DIAGNOSIS — E87.5 HYPERKALEMIA: ICD-10-CM

## 2018-06-28 DIAGNOSIS — N18.30 CKD (CHRONIC KIDNEY DISEASE), STAGE III: ICD-10-CM

## 2018-06-28 LAB
ANION GAP SERPL CALC-SCNC: 8 MMOL/L
BUN SERPL-MCNC: 22 MG/DL
CALCIUM SERPL-MCNC: 9.6 MG/DL
CALCIUM SERPL-MCNC: 9.6 MG/DL
CHLORIDE SERPL-SCNC: 103 MMOL/L
CO2 SERPL-SCNC: 29 MMOL/L
CREAT SERPL-MCNC: 1.7 MG/DL
EST. GFR  (AFRICAN AMERICAN): 41.3 ML/MIN/1.73 M^2
EST. GFR  (NON AFRICAN AMERICAN): 35.7 ML/MIN/1.73 M^2
GLUCOSE SERPL-MCNC: 100 MG/DL
MAGNESIUM SERPL-MCNC: 2.1 MG/DL
POTASSIUM SERPL-SCNC: 5.3 MMOL/L
SODIUM SERPL-SCNC: 140 MMOL/L

## 2018-06-28 PROCEDURE — 83735 ASSAY OF MAGNESIUM: CPT

## 2018-06-28 PROCEDURE — 36415 COLL VENOUS BLD VENIPUNCTURE: CPT | Mod: PO

## 2018-06-28 PROCEDURE — 80048 BASIC METABOLIC PNL TOTAL CA: CPT

## 2018-07-10 ENCOUNTER — OFFICE VISIT (OUTPATIENT)
Dept: FAMILY MEDICINE | Facility: CLINIC | Age: 83
End: 2018-07-10
Payer: MEDICARE

## 2018-07-10 ENCOUNTER — LAB VISIT (OUTPATIENT)
Dept: LAB | Facility: HOSPITAL | Age: 83
End: 2018-07-10
Attending: INTERNAL MEDICINE
Payer: MEDICARE

## 2018-07-10 VITALS
DIASTOLIC BLOOD PRESSURE: 64 MMHG | HEIGHT: 61 IN | HEART RATE: 68 BPM | SYSTOLIC BLOOD PRESSURE: 112 MMHG | TEMPERATURE: 98 F | WEIGHT: 181.44 LBS | BODY MASS INDEX: 34.26 KG/M2

## 2018-07-10 DIAGNOSIS — K59.03 DRUG-INDUCED CONSTIPATION: ICD-10-CM

## 2018-07-10 DIAGNOSIS — I11.9 BENIGN HYPERTENSIVE HEART DISEASE WITHOUT HEART FAILURE: Primary | ICD-10-CM

## 2018-07-10 DIAGNOSIS — E78.2 MIXED HYPERLIPIDEMIA: ICD-10-CM

## 2018-07-10 DIAGNOSIS — Z95.1 S/P CABG X 3: ICD-10-CM

## 2018-07-10 DIAGNOSIS — Z95.5 S/P CORONARY ARTERY STENT PLACEMENT: ICD-10-CM

## 2018-07-10 DIAGNOSIS — N18.30 CKD (CHRONIC KIDNEY DISEASE), STAGE III: ICD-10-CM

## 2018-07-10 DIAGNOSIS — E87.5 HYPERKALEMIA: ICD-10-CM

## 2018-07-10 DIAGNOSIS — E66.9 OBESITY (BMI 30.0-34.9): ICD-10-CM

## 2018-07-10 DIAGNOSIS — E03.9 HYPOTHYROIDISM, UNSPECIFIED TYPE: ICD-10-CM

## 2018-07-10 LAB
ANION GAP SERPL CALC-SCNC: 13 MMOL/L
BUN SERPL-MCNC: 20 MG/DL
CALCIUM SERPL-MCNC: 9.8 MG/DL
CHLORIDE SERPL-SCNC: 103 MMOL/L
CO2 SERPL-SCNC: 26 MMOL/L
CREAT SERPL-MCNC: 1.6 MG/DL
EST. GFR  (AFRICAN AMERICAN): 44.4 ML/MIN/1.73 M^2
EST. GFR  (NON AFRICAN AMERICAN): 38.4 ML/MIN/1.73 M^2
GLUCOSE SERPL-MCNC: 111 MG/DL
POTASSIUM SERPL-SCNC: 3.9 MMOL/L
SODIUM SERPL-SCNC: 142 MMOL/L

## 2018-07-10 PROCEDURE — 36415 COLL VENOUS BLD VENIPUNCTURE: CPT | Mod: PN

## 2018-07-10 PROCEDURE — 80048 BASIC METABOLIC PNL TOTAL CA: CPT

## 2018-07-10 PROCEDURE — 99999 PR PBB SHADOW E&M-EST. PATIENT-LVL III: CPT | Mod: PBBFAC,,, | Performed by: INTERNAL MEDICINE

## 2018-07-10 PROCEDURE — 99214 OFFICE O/P EST MOD 30 MIN: CPT | Mod: S$PBB,,, | Performed by: INTERNAL MEDICINE

## 2018-07-10 PROCEDURE — 99213 OFFICE O/P EST LOW 20 MIN: CPT | Mod: PBBFAC,PN | Performed by: INTERNAL MEDICINE

## 2018-07-10 NOTE — PROGRESS NOTES
Subjective:       Patient ID: Shashi Hassan is a 86 y.o. male.    Chief Complaint: Follow-up, and to go over labs.    HPI  Overall doing fine.  CKD3; saw nephrologist Dr Persaud; cont to monitor. Tries to kepp well hydrated; NO NSAID agents.  HTN: BP avr about 120/80; watches his salt intake. On imdur, metoprolol, spironolactone and diovan.  Hypothyroid; on amiodarone; on T3T4 supplements; takes appropriately.  CABG x3; coronary stent x3; no chest pain, SOB, or palp. Cardiologist is Dr Chase; nucEST next month.  Hx of vent tach on amiodarone; 2 mos ago holter for 2 weeks; did fine.  HLP; on low fat high fiber diet, on lipitor. Tolerates lipitor fine and fish oil..Discussed w pt and wife.  Hyperkalemia; advised of foods to avoid; has been drinking a lot of OJ; to drink more apple juice.   TIME: 7416-0403 hr; >50% time spent on discussion, and review.    Review of Systems   Constitutional: Negative for appetite change and unexpected weight change.   HENT: Negative for congestion, postnasal drip, rhinorrhea and sinus pressure.         Denies seasonal allergies, or perennial allergies   Eyes: Negative for discharge and itching.   Respiratory: Negative for cough, chest tightness, shortness of breath and wheezing.    Cardiovascular: Negative for chest pain, palpitations and leg swelling.   Gastrointestinal: Negative for abdominal pain, blood in stool, constipation, diarrhea, nausea and vomiting.        Occ constipation.   Endocrine: Negative for polydipsia, polyphagia and polyuria.   Genitourinary: Negative for dysuria and hematuria.   Musculoskeletal: Negative for arthralgias and myalgias.   Skin: Negative for rash.   Allergic/Immunologic: Negative for environmental allergies and food allergies.   Neurological: Negative for tremors, seizures and headaches.   Hematological: Negative for adenopathy. Does not bruise/bleed easily.   Psychiatric/Behavioral:        Denies anxiety or depression.       Objective:      Vitals:  "   07/10/18 1008   BP: 112/64   Pulse: 68   Temp: 98.1 °F (36.7 °C)   Weight: 82.3 kg (181 lb 7 oz)   Height: 5' 1" (1.549 m)     Body mass index is 34.28 kg/m².    Physical Exam   Constitutional: He is oriented to person, place, and time. He appears well-developed and well-nourished.   HENT:   Head: Normocephalic and atraumatic.   Eyes: EOM are normal.   Neck: Normal range of motion. Neck supple. No thyromegaly present.   No carotid bruits heard.   Cardiovascular: Normal rate, regular rhythm and normal heart sounds.  Exam reveals no gallop.    No murmur heard.  Pulmonary/Chest: Effort normal and breath sounds normal. No respiratory distress. He has no wheezes. He has no rales.   Abdominal: Soft. Bowel sounds are normal. He exhibits no distension. There is no tenderness. There is no rebound and no guarding.   Musculoskeletal: Normal range of motion. He exhibits edema.   1-2+ micaela LE edema; no calf tenderness to palp. Spider veins noted. Compression stockings would help.   Lymphadenopathy:     He has no cervical adenopathy.   Neurological: He is alert and oriented to person, place, and time.   Moves all 4 extremities fine.   Skin: No rash noted.   Psychiatric: He has a normal mood and affect. His behavior is normal. Thought content normal.   Vitals reviewed.      Assessment:       1. 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 CKD (chronic kidney disease), stage III    3. Mixed hyperlipidemia    4. S/P CABG x 3    5. S/P coronary artery stent placement    6. Hypothyroidism, unspecified type    7. Obesity (BMI 30.0-34.9)    8. Drug-induced constipation    9. Hyperkalemia        Plan:       Benign hypertensive heart disease without heart failure. Maintain < 2 Gm Na a day diet, and monitor BP at home; keep a log. Same meds.  -     Comprehensive metabolic panel; Future; Expected date: 07/10/2018  -     Lipid panel; Future; Expected date: 07/10/2018    CKD (chronic kidney disease), stage III. Stay well " hydrated; no NSAID agents. Sees Dr Persaud in next month.  -     Basic metabolic panel; Future; Expected date: 07/10/2018  -     Urinalysis; Future; Expected date: 07/10/2018  -     Urinalysis Microscopic; Future; Expected date: 10/10/2018  -     Comprehensive metabolic panel; Future; Expected date: 07/10/2018    Mixed hyperlipidemia. Maintain low fat high fiber diet, exercise regularly. Cont atorvastain and fish oil. Lipid panel w f/u.  -     T3, free; Future; Expected date: 07/10/2018  -     T4, free; Future; Expected date: 07/10/2018  -     TSH; Future; Expected date: 07/10/2018  -     Comprehensive metabolic panel; Future; Expected date: 07/10/2018  -     Lipid panel; Future; Expected date: 07/10/2018    S/P CABG x 3; has been stable; sees dr Chase; nuc EST next month,  -     Comprehensive metabolic panel; Future; Expected date: 07/10/2018  -     Lipid panel; Future; Expected date: 07/10/2018    S/P coronary artery stent placement  -     Lipid panel; Future; Expected date: 07/10/2018    Hypothyroidism, unspecified type; same thyroid supplements; TFT w f/u.  -     T3, free; Future; Expected date: 07/10/2018  -     T4, free; Future; Expected date: 07/10/2018  -     TSH; Future; Expected date: 07/10/2018    Obesity (BMI 30.0-34.9). Caloric restriction w regular exercise and weight reduction.    Drug-induced constipation; keep well hydrated; colace 1-2x a day. On citrucel daily.  -     T3, free; Future; Expected date: 07/10/2018  -     T4, free; Future; Expected date: 07/10/2018  -     TSH; Future; Expected date: 07/10/2018  -     Comprehensive metabolic panel; Future; Expected date: 07/10/2018    Hyperkalemia; given literature on foods to avoid; BMP today for reassessment.  -     Basic metabolic panel; Future; Expected date: 07/10/2018

## 2018-07-10 NOTE — PATIENT INSTRUCTIONS
Hyperkalemia  Hyperkalemia is too much potassium in the blood. This most often occurs in people who take certain medicines or people with kidney disease.  This condition often has no symptoms until levels of potassium become high. If symptoms do occur, they include muscle weakness and changes in the heartbeat. A blood test is done to diagnose the problem. An ECG (electrocardiogram) may also be done to test the heartbeat.  If hyperkalemia is caused by a medicine, the healthcare provider may lower the dose or switch to a different medicine. A low-potassium diet may also be prescribed.   Home care  · Be sure your healthcare provider knows about all of the medicines you take. Follow your healthcare provider's advice about making changes to your medicines.  · If a low-potassium diet has been prescribed, follow this closely. If you need help, ask to be referred to a dietitian for advice on how to follow this diet.  Follow-up care  Follow up with your healthcare provider. You may need a repeat blood test within the next 7 days. Schedule this as advised.  When to seek medical advice  Call your healthcare provider if any of the following occur:  · Weakness, dizziness, or lightheadedness  · Nausea, vomiting, or diarrhea  · Urinating only in small amounts or not urinating  · Symptoms don't go away or get worse  Call 911  Call 911 or emergency services right away if any of the following occur:  · Fast or irregular heartbeat  · Fainting  · Severe shortness of breath  · Chest, arm, shoulder, neck or upper back pain  · Trouble controlling your muscles  Date Last Reviewed: 6/26/2015  © 4079-0453 [employer]. [address] [place] [address], [place] [address]. All rights reserved. This information is not intended as a substitute for professional medical care. Always follow your healthcare professional's instructions.        Discharge Instructions for Hyperkalemia  You have been diagnosed with hyperkalemia (a high level of  "potassium in the blood). Potassium is important to the function of the nerve and muscle cells, including the cells of the heart. But a high level of potassium in the blood can cause  serious problems such as abnormal heart rhythms and even heart attack.  Diet changes  · Eat less of these potassium-rich foods:  ¨ Bananas (avoid bananas completely)  ¨ Apricots, fresh or dried  ¨ Oranges and orange juice  ¨ Grapefruit juice  ¨ Tomatoes, tomato sauce, and tomato juice  ¨ Spinach  ¨ Green, leafy vegetables, including salad greens, kale, broccoli, chard, and collards  ¨ Melons (all kinds)  ¨ Peas  ¨ Beans  ¨ Potatoes  ¨ Sweet potatoes  ¨ Avocados and guacamole  ¨ Vegetable juice (homemade or store-bought) and vegetable juice cocktail  ¨ Fruit juices  ¨ Nuts, including pistachios, almonds, peanuts, hazelnuts, [place], cashew, mixed  ¨ "Lite" or reduced sodium salt  Other home care  · Tell your healthcare provider about all prescription and over-the-counter medicines you are taking. Certain medicines can increase potassium levels.  · Take all medicines exactly as directed.  · Have your potassium levels checked regularly.  · Keep all follow-up appointments. Your healthcare provider needs to monitor your condition closely.  · Learn to take your own pulse. If your pulse is less than 60 beats per minute or irregular, call your provider.  Follow-up  Make a follow-up appointment as directed by our staff.     When to Call Your healthcare provider  Call your provider right away if you have any of the following:  · Chest pain (call 911)  · Fainting (call 911)  · Shortness of breath (call 911 if severe)  · Slow, irregular heartbeat  · Fatigue  · Dizziness  · Lightheadedness  · Confusion   Date Last Reviewed: 6/19/2015  © 6476-0994 [employer]. [address] [place] [address], [place] [address]. All rights reserved. This information is not intended as a substitute for professional medical care. Always follow your healthcare " professional's instructions.      Benign hypertensive heart disease without heart failure. Maintain < 2 Gm Na a day diet, and monitor BP at home; keep a log. Same meds.  -     Comprehensive metabolic panel; Future; Expected date: 07/10/2018  -     Lipid panel; Future; Expected date: 07/10/2018    CKD (chronic kidney disease), stage III. Stay well hydrated; no NSAID agents. Sees Dr Persaud in next month.  -     Basic metabolic panel; Future; Expected date: 07/10/2018  -     Urinalysis; Future; Expected date: 07/10/2018  -     Urinalysis Microscopic; Future; Expected date: 10/10/2018  -     Comprehensive metabolic panel; Future; Expected date: 07/10/2018    Mixed hyperlipidemia. Maintain low fat high fiber diet, exercise regularly. Cont atorvastain and fish oil. Lipid panel w f/u.  -     T3, free; Future; Expected date: 07/10/2018  -     T4, free; Future; Expected date: 07/10/2018  -     TSH; Future; Expected date: 07/10/2018  -     Comprehensive metabolic panel; Future; Expected date: 07/10/2018  -     Lipid panel; Future; Expected date: 07/10/2018    S/P CABG x 3; has been stable; sees dr Chase; nuc EST next month,  -     Comprehensive metabolic panel; Future; Expected date: 07/10/2018  -     Lipid panel; Future; Expected date: 07/10/2018    S/P coronary artery stent placement  -     Lipid panel; Future; Expected date: 07/10/2018    Hypothyroidism, unspecified type; same thyroid supplements; TFT w f/u.  -     T3, free; Future; Expected date: 07/10/2018  -     T4, free; Future; Expected date: 07/10/2018  -     TSH; Future; Expected date: 07/10/2018    Obesity (BMI 30.0-34.9). Caloric restriction w regular exercise and weight reduction.    Drug-induced constipation; keep well hydrated; colace 1-2x a day. On citrucel daily.  -     T3, free; Future; Expected date: 07/10/2018  -     T4, free; Future; Expected date: 07/10/2018  -     TSH; Future; Expected date: 07/10/2018  -     Comprehensive metabolic panel; Future;  Expected date: 07/10/2018    Hyperkalemia; given literature on foods to avoid; BMP today for reassessment.  -     Basic metabolic panel; Future; Expected date: 07/10/2018

## 2018-08-09 ENCOUNTER — LAB VISIT (OUTPATIENT)
Dept: LAB | Facility: HOSPITAL | Age: 83
End: 2018-08-09
Attending: INTERNAL MEDICINE
Payer: MEDICARE

## 2018-08-09 DIAGNOSIS — E55.9 VITAMIN D DEFICIENCY: ICD-10-CM

## 2018-08-09 DIAGNOSIS — N18.30 CKD (CHRONIC KIDNEY DISEASE) STAGE 3, GFR 30-59 ML/MIN: ICD-10-CM

## 2018-08-09 LAB
25(OH)D3+25(OH)D2 SERPL-MCNC: 74 NG/ML
ALBUMIN SERPL BCP-MCNC: 3.6 G/DL
ANION GAP SERPL CALC-SCNC: 7 MMOL/L
BUN SERPL-MCNC: 22 MG/DL
CALCIUM SERPL-MCNC: 10.1 MG/DL
CHLORIDE SERPL-SCNC: 105 MMOL/L
CO2 SERPL-SCNC: 31 MMOL/L
CREAT SERPL-MCNC: 1.6 MG/DL
EST. GFR  (AFRICAN AMERICAN): 44.4 ML/MIN/1.73 M^2
EST. GFR  (NON AFRICAN AMERICAN): 38.4 ML/MIN/1.73 M^2
GLUCOSE SERPL-MCNC: 98 MG/DL
PHOSPHATE SERPL-MCNC: 3.2 MG/DL
POTASSIUM SERPL-SCNC: 5.1 MMOL/L
PTH-INTACT SERPL-MCNC: 29 PG/ML
SODIUM SERPL-SCNC: 143 MMOL/L

## 2018-08-09 PROCEDURE — 83970 ASSAY OF PARATHORMONE: CPT

## 2018-08-09 PROCEDURE — 80069 RENAL FUNCTION PANEL: CPT

## 2018-08-09 PROCEDURE — 82306 VITAMIN D 25 HYDROXY: CPT

## 2018-08-09 PROCEDURE — 36415 COLL VENOUS BLD VENIPUNCTURE: CPT | Mod: PO

## 2018-08-27 DIAGNOSIS — Z95.1 S/P CABG X 3: ICD-10-CM

## 2018-08-27 DIAGNOSIS — I11.9 BENIGN HYPERTENSIVE HEART DISEASE WITHOUT HEART FAILURE: ICD-10-CM

## 2018-08-27 DIAGNOSIS — Z95.5 S/P CORONARY ARTERY STENT PLACEMENT: ICD-10-CM

## 2018-08-27 RX ORDER — METOPROLOL TARTRATE 25 MG/1
TABLET, FILM COATED ORAL
Qty: 90 TABLET | Refills: 1 | Status: SHIPPED | OUTPATIENT
Start: 2018-08-27 | End: 2019-01-14 | Stop reason: SDUPTHER

## 2018-09-11 ENCOUNTER — OFFICE VISIT (OUTPATIENT)
Dept: NEPHROLOGY | Facility: CLINIC | Age: 83
End: 2018-09-11
Payer: MEDICARE

## 2018-09-11 VITALS
BODY MASS INDEX: 33.14 KG/M2 | HEIGHT: 61 IN | WEIGHT: 175.5 LBS | DIASTOLIC BLOOD PRESSURE: 50 MMHG | SYSTOLIC BLOOD PRESSURE: 96 MMHG | OXYGEN SATURATION: 95 % | HEART RATE: 53 BPM

## 2018-09-11 DIAGNOSIS — I10 BENIGN ESSENTIAL HYPERTENSION: ICD-10-CM

## 2018-09-11 DIAGNOSIS — N18.30 CKD (CHRONIC KIDNEY DISEASE), STAGE III: ICD-10-CM

## 2018-09-11 DIAGNOSIS — I11.9 BENIGN HYPERTENSIVE HEART DISEASE WITHOUT HEART FAILURE: ICD-10-CM

## 2018-09-11 DIAGNOSIS — E66.9 OBESITY (BMI 30.0-34.9): ICD-10-CM

## 2018-09-11 DIAGNOSIS — E78.2 MIXED HYPERLIPIDEMIA: ICD-10-CM

## 2018-09-11 DIAGNOSIS — N18.30 CKD (CHRONIC KIDNEY DISEASE) STAGE 3, GFR 30-59 ML/MIN: Primary | ICD-10-CM

## 2018-09-11 PROCEDURE — 99213 OFFICE O/P EST LOW 20 MIN: CPT | Mod: S$PBB,,, | Performed by: INTERNAL MEDICINE

## 2018-09-11 PROCEDURE — 99214 OFFICE O/P EST MOD 30 MIN: CPT | Mod: PBBFAC,PO | Performed by: INTERNAL MEDICINE

## 2018-09-11 PROCEDURE — 99999 PR PBB SHADOW E&M-EST. PATIENT-LVL IV: CPT | Mod: PBBFAC,,, | Performed by: INTERNAL MEDICINE

## 2018-09-11 RX ORDER — TRAMADOL HYDROCHLORIDE 50 MG/1
TABLET ORAL
Refills: 0 | COMMUNITY
Start: 2018-08-16 | End: 2018-09-11

## 2018-09-11 RX ORDER — AMOXICILLIN 500 MG/1
CAPSULE ORAL
Refills: 0 | COMMUNITY
Start: 2018-09-05 | End: 2018-09-11

## 2018-09-11 RX ORDER — IBUPROFEN 800 MG/1
TABLET ORAL
Refills: 0 | COMMUNITY
Start: 2018-08-10 | End: 2018-09-11

## 2018-09-11 RX ORDER — OXYCODONE AND ACETAMINOPHEN 5; 325 MG/1; MG/1
TABLET ORAL
Refills: 0 | COMMUNITY
Start: 2018-09-05 | End: 2018-09-11

## 2018-09-11 RX ORDER — RANOLAZINE 500 MG/1
TABLET, FILM COATED, EXTENDED RELEASE ORAL
Refills: 2 | COMMUNITY
Start: 2018-08-29

## 2018-10-01 NOTE — PROGRESS NOTES
Subjective:       Patient ID: Shashi Hassan is a 87 y.o. White male who presents for follow-up evaluation of Chronic Kidney Disease    HPI     He is doing well except a LHC is possible in the future. No CP nor SOB    hhe denies foamy urine, no hematuria and no flank pain. He follows a low sodium diet and feels he stays hydrated. No LE edema and no SOB. No NSAID use and no herbal medications.     Review of Systems   Constitutional: Negative for activity change, appetite change, fatigue and unexpected weight change.   HENT: Negative for facial swelling.    Respiratory: Negative for cough and shortness of breath.    Cardiovascular: Negative for chest pain and leg swelling.   Gastrointestinal: Negative for abdominal distention.   Genitourinary: Negative for difficulty urinating, dysuria, frequency, hematuria and urgency.   Musculoskeletal: Negative for arthralgias.   Neurological: Negative for weakness and headaches.   Hematological: Does not bruise/bleed easily.   Psychiatric/Behavioral: Negative for decreased concentration.       Objective:      Physical Exam   Constitutional: He is oriented to person, place, and time. He appears well-developed and well-nourished. No distress.   Neck: No JVD present.   Cardiovascular: S1 normal and S2 normal. Exam reveals no friction rub.   Pulmonary/Chest: He has no wheezes. He has no rales.   Abdominal: Soft.   Musculoskeletal: He exhibits no edema.   Neurological: He is alert and oriented to person, place, and time.   Skin: Skin is warm and dry.   Psychiatric: He has a normal mood and affect.   Nursing note and vitals reviewed.      Assessment:       1. CKD (chronic kidney disease) stage 3, GFR 30-59 ml/min    2. CKD (chronic kidney disease), stage III    3. Benign hypertensive heart disease without heart failure    4. Mixed hyperlipidemia    5. Benign essential hypertension    6. Obesity (BMI 30.0-34.9)        Plan:             CKD Stage 3 with stable kidney function.  Continue  RAAS blockade (sprinolactone) for renal preservation    BP uncomfortably low but he denies orthostasis    Mineral and Bone Disease--PTH and D level at goal      RTC  4 months

## 2018-10-03 ENCOUNTER — LAB VISIT (OUTPATIENT)
Dept: LAB | Facility: HOSPITAL | Age: 83
End: 2018-10-03
Attending: INTERNAL MEDICINE
Payer: MEDICARE

## 2018-10-03 DIAGNOSIS — Z95.5 S/P CORONARY ARTERY STENT PLACEMENT: ICD-10-CM

## 2018-10-03 DIAGNOSIS — I11.9 BENIGN HYPERTENSIVE HEART DISEASE WITHOUT HEART FAILURE: ICD-10-CM

## 2018-10-03 DIAGNOSIS — E78.2 MIXED HYPERLIPIDEMIA: ICD-10-CM

## 2018-10-03 DIAGNOSIS — E03.9 HYPOTHYROIDISM, UNSPECIFIED TYPE: ICD-10-CM

## 2018-10-03 DIAGNOSIS — K59.03 DRUG-INDUCED CONSTIPATION: ICD-10-CM

## 2018-10-03 DIAGNOSIS — N18.30 CKD (CHRONIC KIDNEY DISEASE), STAGE III: ICD-10-CM

## 2018-10-03 DIAGNOSIS — Z95.1 S/P CABG X 3: ICD-10-CM

## 2018-10-03 LAB
ALBUMIN SERPL BCP-MCNC: 3.9 G/DL
ALP SERPL-CCNC: 63 U/L
ALT SERPL W/O P-5'-P-CCNC: 21 U/L
ANION GAP SERPL CALC-SCNC: 12 MMOL/L
AST SERPL-CCNC: 20 U/L
BILIRUB SERPL-MCNC: 0.7 MG/DL
BUN SERPL-MCNC: 21 MG/DL
CALCIUM SERPL-MCNC: 9.3 MG/DL
CHLORIDE SERPL-SCNC: 101 MMOL/L
CHOLEST SERPL-MCNC: 127 MG/DL
CHOLEST/HDLC SERPL: 3.4 {RATIO}
CO2 SERPL-SCNC: 23 MMOL/L
CREAT SERPL-MCNC: 1.6 MG/DL
EST. GFR  (AFRICAN AMERICAN): 44.1 ML/MIN/1.73 M^2
EST. GFR  (NON AFRICAN AMERICAN): 38.2 ML/MIN/1.73 M^2
GLUCOSE SERPL-MCNC: 100 MG/DL
HDLC SERPL-MCNC: 37 MG/DL
HDLC SERPL: 29.1 %
LDLC SERPL CALC-MCNC: 51 MG/DL
NONHDLC SERPL-MCNC: 90 MG/DL
POTASSIUM SERPL-SCNC: 4.1 MMOL/L
PROT SERPL-MCNC: 7.2 G/DL
SODIUM SERPL-SCNC: 136 MMOL/L
T3FREE SERPL-MCNC: 3 PG/ML
T4 FREE SERPL-MCNC: 1.02 NG/DL
TRIGL SERPL-MCNC: 195 MG/DL
TSH SERPL DL<=0.005 MIU/L-ACNC: 2.29 UIU/ML

## 2018-10-03 PROCEDURE — 84439 ASSAY OF FREE THYROXINE: CPT

## 2018-10-03 PROCEDURE — 80061 LIPID PANEL: CPT

## 2018-10-03 PROCEDURE — 84481 FREE ASSAY (FT-3): CPT

## 2018-10-03 PROCEDURE — 84443 ASSAY THYROID STIM HORMONE: CPT

## 2018-10-03 PROCEDURE — 36415 COLL VENOUS BLD VENIPUNCTURE: CPT | Mod: PN

## 2018-10-03 PROCEDURE — 80053 COMPREHEN METABOLIC PANEL: CPT

## 2018-10-10 ENCOUNTER — OFFICE VISIT (OUTPATIENT)
Dept: FAMILY MEDICINE | Facility: CLINIC | Age: 83
End: 2018-10-10
Payer: MEDICARE

## 2018-10-10 VITALS
SYSTOLIC BLOOD PRESSURE: 102 MMHG | TEMPERATURE: 98 F | BODY MASS INDEX: 32.9 KG/M2 | DIASTOLIC BLOOD PRESSURE: 70 MMHG | HEART RATE: 64 BPM | HEIGHT: 61 IN | WEIGHT: 174.25 LBS

## 2018-10-10 DIAGNOSIS — N18.30 CKD (CHRONIC KIDNEY DISEASE), STAGE III: ICD-10-CM

## 2018-10-10 DIAGNOSIS — I10 BENIGN ESSENTIAL HYPERTENSION: Primary | ICD-10-CM

## 2018-10-10 DIAGNOSIS — Z91.89 AT RISK FOR SIDE EFFECT OF MEDICATION: ICD-10-CM

## 2018-10-10 DIAGNOSIS — R60.0 BILATERAL LEG EDEMA: ICD-10-CM

## 2018-10-10 DIAGNOSIS — Z12.5 PROSTATE CANCER SCREENING: ICD-10-CM

## 2018-10-10 DIAGNOSIS — E03.9 HYPOTHYROIDISM, UNSPECIFIED TYPE: ICD-10-CM

## 2018-10-10 DIAGNOSIS — Z86.79 HISTORY OF VENTRICULAR TACHYCARDIA: ICD-10-CM

## 2018-10-10 DIAGNOSIS — E66.9 OBESITY (BMI 30.0-34.9): ICD-10-CM

## 2018-10-10 DIAGNOSIS — Z95.5 S/P CORONARY ARTERY STENT PLACEMENT: ICD-10-CM

## 2018-10-10 DIAGNOSIS — E78.2 MIXED HYPERLIPIDEMIA: ICD-10-CM

## 2018-10-10 DIAGNOSIS — Z95.1 S/P CABG X 3: ICD-10-CM

## 2018-10-10 PROCEDURE — 99999 PR PBB SHADOW E&M-EST. PATIENT-LVL III: CPT | Mod: PBBFAC,,, | Performed by: INTERNAL MEDICINE

## 2018-10-10 PROCEDURE — 99213 OFFICE O/P EST LOW 20 MIN: CPT | Mod: PBBFAC,PN,25 | Performed by: INTERNAL MEDICINE

## 2018-10-10 PROCEDURE — 90662 IIV NO PRSV INCREASED AG IM: CPT | Mod: PBBFAC,PN

## 2018-10-10 PROCEDURE — 99214 OFFICE O/P EST MOD 30 MIN: CPT | Mod: S$PBB,,, | Performed by: INTERNAL MEDICINE

## 2018-10-10 RX ORDER — SPIRONOLACTONE AND HYDROCHLOROTHIAZIDE 25; 25 MG/1; MG/1
0.5 TABLET ORAL DAILY
Refills: 1 | COMMUNITY
Start: 2018-09-12 | End: 2019-02-12

## 2018-10-10 NOTE — PROGRESS NOTES
Subjective:       Patient ID: Shashi Hassan is a 87 y.o. male.    Chief Complaint: No chief complaint on file.    HPI  Pt here for follow-up reassessment and go over his labs; labs were reviewed with patient and his wife at length and ordered for follow-up  Benign hypertensive heart disease without heart failure:  BP runs 121/72 at home; patient watches his salt intake.  102/70 manually here at the office.  Patient is tolerating his blood pressure fine.    CKD 3:  Uses Tylenol for pain and tries to keep well hydrated during the day he knows not to use NSA ID agents. Sees nephrologist Dr. FEDE Persaud.  Potential side effect of medication:  Due to the valsartan recall will change to losartan/Cozaar.    Status post 3 vessel CABG/status post coronary stent x3:  Sees Dr. Chase as his cardiologist.  He has no complaints of chest pain, shortness of breath, or palpitations.  Has been stable.  Dr. Chase/cardiologist's recent progress note was reviewed; left heart catheterization is to be scheduled and performed for reassessment.  Arrhythmia; hx of V-tach/SVT:  Patient is on amiodarone therapy as per Dr. Chase.    Mixed hyperlipidemia:  He is on low-fat high-fiber diet; he tolerates atorvastatin fine.  Based on recent lipid levels he needs to adhere to a low dairy diet better.  Continue low-fat high-fiber diet with exercise regularly.  Continue present dose atorvastatin.  His cholesterol was 127 with a triglyceride 195 and HDL of 37 with LDL 51.    Anxiety:  Reportedly doing okay; no complaints of depression.     Vitamin-D deficiency:  Based on vitamin-D levels recommend reducing vitamin D to 2000 IU is a day from twice a day; 8/9/18 25- hydroxy vitamin D level was 74.  Total time 11:10 a.m.-11 40.  Greater than 50% of time spent in discussion, counseling, and review. Labs ordered for follow-up      Review of Systems   Constitutional: Negative for appetite change and unexpected weight change.   HENT: Negative for congestion,  "postnasal drip, rhinorrhea and sinus pressure.         Denies seasonal allergies, or perennial allergies   Eyes: Negative for discharge and itching.   Respiratory: Negative for cough, chest tightness, shortness of breath and wheezing.    Cardiovascular: Negative for chest pain, palpitations and leg swelling.   Gastrointestinal: Negative for abdominal pain, blood in stool, constipation, diarrhea, nausea and vomiting.   Endocrine: Negative for polydipsia, polyphagia and polyuria.   Genitourinary: Negative for dysuria and hematuria.   Musculoskeletal: Negative for arthralgias and myalgias.   Skin: Negative for rash.   Allergic/Immunologic: Negative for environmental allergies and food allergies.   Neurological: Negative for tremors, seizures and headaches.   Hematological: Negative for adenopathy. Does not bruise/bleed easily.   Psychiatric/Behavioral:        Denies anxiety or depression.       Objective:      Vitals:    10/10/18 1000   BP: 102/70   Pulse: 64   Temp: 98.1 °F (36.7 °C)   Weight: 79.1 kg (174 lb 4.4 oz)   Height: 5' 1" (1.549 m)     Body mass index is 32.93 kg/m².    Physical Exam   Constitutional: He is oriented to person, place, and time. He appears well-developed and well-nourished.   HENT:   Head: Normocephalic and atraumatic.   Eyes: EOM are normal.   Neck: Normal range of motion. Neck supple. No thyromegaly present.   No carotid bruits heard.   Cardiovascular: Normal rate, regular rhythm and normal heart sounds. Exam reveals no gallop.   No murmur heard.  Pulmonary/Chest: Effort normal and breath sounds normal. No respiratory distress. He has no wheezes. He has no rales.   Abdominal: Soft. Bowel sounds are normal. He exhibits no distension. There is no tenderness. There is no rebound and no guarding.   Musculoskeletal: Normal range of motion. He exhibits edema.   2+ micaela LE edema w spider veins noted; no calf tenderness to palp.   Lymphadenopathy:     He has no cervical adenopathy.   Neurological: " He is alert and oriented to person, place, and time.   Moves all 4 extremities fine.   Skin: No rash noted.   Psychiatric: He has a normal mood and affect. His behavior is normal. Thought content normal.   Vitals reviewed.      Assessment:       1. Benign essential hypertension    2. CKD (chronic kidney disease), stage III    3. At risk for side effect of medication    4. Mixed hyperlipidemia    5. S/P CABG x 3    6. S/P coronary artery stent placement    7. History of ventricular tachycardia    8. Bilateral leg edema    9. Hypothyroidism, unspecified type    10. Obesity (BMI 30.0-34.9)    11. Prostate cancer screening        Plan:       Benign essential hypertension. Maintain < 2 Gm Na a day diet, and monitor BP at home; keep a log. Same meds.  -     CBC auto differential; Future; Expected date: 10/10/2018  -     Comprehensive metabolic panel; Future; Expected date: 10/10/2018  -     Magnesium; Future; Expected date: 10/10/2018  -     Urinalysis Microscopic; Future; Expected date: 01/10/2019  -     Urinalysis; Future; Expected date: 10/10/2018    CKD (chronic kidney disease), stage III; keep well hydrated; no NSAID's; use tylenol for pain; sees Neph Dr Persaud    At risk of potential side effect of medication; due to valsartan/Diovan recall it has been discontinued for the patient; he is being placed on losartan/Cozaar 25 mg p.o. daily in its place    Mixed hyperlipidemia. Maintain low fat high fiber diet, exercise regularly. Cont atorvastatin and fish oil 2x a day; watch dairy products better.  -     Comprehensive metabolic panel; Future; Expected date: 10/10/2018  -     Lipid panel; Future; Expected date: 10/10/2018    S/P CABG x 3; sees Dr Chase; his note reviewed; for [facility] to be scheduled.  -     CBC auto differential; Future; Expected date: 10/10/2018  -     Comprehensive metabolic panel; Future; Expected date: 10/10/2018  -     Lipid panel; Future; Expected date: 10/10/2018    S/P coronary artery stent  placement  -     CBC auto differential; Future; Expected date: 10/10/2018  -     Comprehensive metabolic panel; Future; Expected date: 10/10/2018  -     Lipid panel; Future; Expected date: 10/10/2018    Hx of Ventricular tachycardia/SVT; on amiodarone as per Dr Chase. Minimum yearly eye exams as well.  -     CBC auto differential; Future; Expected date: 10/10/2018  -     Comprehensive metabolic panel; Future; Expected date: 10/10/2018  -     Magnesium; Future; Expected date: 10/10/2018    Bilateral leg edema. Maintain less than 2 g sodium diet; elevate lower extremities at home; use compression stockings if possible.    Hypothyroidism, unspecified type; TFT's therapeutic; will check TFT's q 6 mos about.  On amiodarone therapy as well as generics cytomel as well as generic Synthroid.    Obesity (BMI 30.0-34.9). Caloric restriction w regular exercise and weight reduction.    Prostate cancer screening  -     PSA, Screening; Future; Expected date: 01/10/2019    Other orders  -     Influenza - High Dose (65+) (PF) (IM)

## 2018-10-10 NOTE — PATIENT INSTRUCTIONS
Benign essential hypertension. Maintain < 2 Gm Na a day diet, and monitor BP at home; keep a log. Same meds.  -     CBC auto differential; Future; Expected date: 10/10/2018  -     Comprehensive metabolic panel; Future; Expected date: 10/10/2018  -     Magnesium; Future; Expected date: 10/10/2018  -     Urinalysis Microscopic; Future; Expected date: 01/10/2019  -     Urinalysis; Future; Expected date: 10/10/2018    CKD (chronic kidney disease), stage III; keep well hydrated; no NSAID's; use tylenol for pain; sees Neph Dr Persaud    Mixed hyperlipidemia. Maintain low fat high fiber diet, exercise regularly. Cont atorvastatin and fish oil 2x a day; watch dairy products better.  -     Comprehensive metabolic panel; Future; Expected date: 10/10/2018  -     Lipid panel; Future; Expected date: 10/10/2018    S/P CABG x 3; sees Dr Chase; his note reviewed; for [facility] to be scheduled.  -     CBC auto differential; Future; Expected date: 10/10/2018  -     Comprehensive metabolic panel; Future; Expected date: 10/10/2018  -     Lipid panel; Future; Expected date: 10/10/2018    S/P coronary artery stent placement  -     CBC auto differential; Future; Expected date: 10/10/2018  -     Comprehensive metabolic panel; Future; Expected date: 10/10/2018  -     Lipid panel; Future; Expected date: 10/10/2018    History of ventricular tachycardia/SVT; on amiodarone as per Dr Chase.  -     CBC auto differential; Future; Expected date: 10/10/2018  -     Comprehensive metabolic panel; Future; Expected date: 10/10/2018  -     Magnesium; Future; Expected date: 10/10/2018    Bilateral leg edema. Maintain less than 2 g sodium diet; elevate lower extremities at home; use compression stockings if possible.    Hypothyroidism, unspecified type; TFT's therapeutic; will check TFT's q 6 mos about.    Obesity (BMI 30.0-34.9)    Prostate cancer screening  -     PSA, Screening; Future; Expected date: 01/10/2019    Other orders  -     Cancel: Lipid panel;  Future; Expected date: 01/10/2019  -     Cancel: Comprehensive metabolic panel; Future; Expected date: 01/10/2019  -     Cancel: URINALYSIS; Future; Expected date: 01/10/2019  -     Cancel: Urinalysis Microscopic; Future; Expected date: 01/10/2019  -     Influenza - High Dose (65+) (PF) (IM)

## 2018-10-20 ENCOUNTER — TELEPHONE (OUTPATIENT)
Dept: FAMILY MEDICINE | Facility: CLINIC | Age: 83
End: 2018-10-20

## 2018-10-20 RX ORDER — LOSARTAN POTASSIUM 25 MG/1
25 TABLET ORAL DAILY
Qty: 90 TABLET | Refills: 1 | Status: SHIPPED | OUTPATIENT
Start: 2018-10-20 | End: 2019-04-02

## 2018-10-20 NOTE — TELEPHONE ENCOUNTER
Discussed with patient's wife by phone that patient is to stop valsartan 40 mg p.o. daily and replace it with losartan 25 mg p.o. daily due to drug recall on valsartan/Diovan

## 2018-11-15 RX ORDER — LEVOTHYROXINE SODIUM 50 UG/1
TABLET ORAL
Qty: 90 TABLET | Refills: 0 | Status: SHIPPED | OUTPATIENT
Start: 2018-11-15 | End: 2019-02-21 | Stop reason: SDUPTHER

## 2018-11-15 RX ORDER — LIOTHYRONINE SODIUM 5 UG/1
TABLET ORAL
Qty: 90 TABLET | Refills: 0 | Status: SHIPPED | OUTPATIENT
Start: 2018-11-15 | End: 2019-02-21 | Stop reason: SDUPTHER

## 2018-12-04 PROBLEM — R94.39 ABNORMAL STRESS ECG: Status: ACTIVE | Noted: 2018-12-04

## 2019-01-09 ENCOUNTER — TELEPHONE (OUTPATIENT)
Dept: NEPHROLOGY | Facility: CLINIC | Age: 84
End: 2019-01-09

## 2019-01-09 ENCOUNTER — TELEPHONE (OUTPATIENT)
Dept: FAMILY MEDICINE | Facility: CLINIC | Age: 84
End: 2019-01-09

## 2019-01-09 NOTE — TELEPHONE ENCOUNTER
----- Message from Debra Ballesteros sent at 1/9/2019  9:45 AM CST -----    Pt is calling  For  Blood  Work orders  In  The  Computer  For an stephy  Today// call [phone]

## 2019-01-09 NOTE — TELEPHONE ENCOUNTER
Labs rescheduled and patient cancelled appt with Dr Mota. Advised to call and reschedule follow up.

## 2019-01-09 NOTE — TELEPHONE ENCOUNTER
Spoke with patient's wife and confirmed lab appointment is tomorrow and has orders.  Confirmed location. Confirmed [facility] orders are linked to that lab draw, as well.  Scheduled for Dr. Mota at her request.  Confirmed Hung appt on Tuesday.

## 2019-01-09 NOTE — TELEPHONE ENCOUNTER
----- Message from Debra Ballesteros sent at 1/9/2019  9:44 AM CST -----    Pt is calling  For  Lab  Orders   To  Be  Put in  Computer //call [phone]

## 2019-01-10 ENCOUNTER — LAB VISIT (OUTPATIENT)
Dept: LAB | Facility: HOSPITAL | Age: 84
End: 2019-01-10
Attending: INTERNAL MEDICINE
Payer: MEDICARE

## 2019-01-10 DIAGNOSIS — I10 BENIGN ESSENTIAL HYPERTENSION: ICD-10-CM

## 2019-01-10 DIAGNOSIS — Z12.5 PROSTATE CANCER SCREENING: ICD-10-CM

## 2019-01-10 DIAGNOSIS — Z95.5 S/P CORONARY ARTERY STENT PLACEMENT: ICD-10-CM

## 2019-01-10 DIAGNOSIS — Z86.79 HISTORY OF VENTRICULAR TACHYCARDIA: ICD-10-CM

## 2019-01-10 DIAGNOSIS — E78.2 MIXED HYPERLIPIDEMIA: ICD-10-CM

## 2019-01-10 DIAGNOSIS — Z95.1 S/P CABG X 3: ICD-10-CM

## 2019-01-10 DIAGNOSIS — N18.30 CKD (CHRONIC KIDNEY DISEASE) STAGE 3, GFR 30-59 ML/MIN: ICD-10-CM

## 2019-01-10 LAB
ALBUMIN SERPL BCP-MCNC: 3.7 G/DL
ALBUMIN SERPL BCP-MCNC: 3.7 G/DL
ALP SERPL-CCNC: 67 U/L
ALT SERPL W/O P-5'-P-CCNC: 27 U/L
ANION GAP SERPL CALC-SCNC: 8 MMOL/L
ANION GAP SERPL CALC-SCNC: 8 MMOL/L
AST SERPL-CCNC: 23 U/L
BASOPHILS # BLD AUTO: 0.02 K/UL
BASOPHILS NFR BLD: 0.3 %
BILIRUB SERPL-MCNC: 0.5 MG/DL
BUN SERPL-MCNC: 18 MG/DL
BUN SERPL-MCNC: 18 MG/DL
CALCIUM SERPL-MCNC: 10 MG/DL
CALCIUM SERPL-MCNC: 10 MG/DL
CHLORIDE SERPL-SCNC: 100 MMOL/L
CHLORIDE SERPL-SCNC: 100 MMOL/L
CHOLEST SERPL-MCNC: 132 MG/DL
CHOLEST/HDLC SERPL: 3.3 {RATIO}
CO2 SERPL-SCNC: 28 MMOL/L
CO2 SERPL-SCNC: 28 MMOL/L
COMPLEXED PSA SERPL-MCNC: 1.5 NG/ML
CREAT SERPL-MCNC: 1.6 MG/DL
CREAT SERPL-MCNC: 1.6 MG/DL
DIFFERENTIAL METHOD: NORMAL
EOSINOPHIL # BLD AUTO: 0.1 K/UL
EOSINOPHIL NFR BLD: 1.5 %
ERYTHROCYTE [DISTWIDTH] IN BLOOD BY AUTOMATED COUNT: 13.3 %
EST. GFR  (AFRICAN AMERICAN): 44.1 ML/MIN/1.73 M^2
EST. GFR  (AFRICAN AMERICAN): 44.1 ML/MIN/1.73 M^2
EST. GFR  (NON AFRICAN AMERICAN): 38.2 ML/MIN/1.73 M^2
EST. GFR  (NON AFRICAN AMERICAN): 38.2 ML/MIN/1.73 M^2
GLUCOSE SERPL-MCNC: 107 MG/DL
GLUCOSE SERPL-MCNC: 107 MG/DL
HCT VFR BLD AUTO: 42.9 %
HDLC SERPL-MCNC: 40 MG/DL
HDLC SERPL: 30.3 %
HGB BLD-MCNC: 14.3 G/DL
IMM GRANULOCYTES # BLD AUTO: 0.02 K/UL
IMM GRANULOCYTES NFR BLD AUTO: 0.3 %
LDLC SERPL CALC-MCNC: 56.8 MG/DL
LYMPHOCYTES # BLD AUTO: 1.8 K/UL
LYMPHOCYTES NFR BLD: 28.9 %
MAGNESIUM SERPL-MCNC: 2 MG/DL
MCH RBC QN AUTO: 30.6 PG
MCHC RBC AUTO-ENTMCNC: 33.3 G/DL
MCV RBC AUTO: 92 FL
MONOCYTES # BLD AUTO: 0.5 K/UL
MONOCYTES NFR BLD: 7.5 %
NEUTROPHILS # BLD AUTO: 3.8 K/UL
NEUTROPHILS NFR BLD: 61.5 %
NONHDLC SERPL-MCNC: 92 MG/DL
NRBC BLD-RTO: 0 /100 WBC
PHOSPHATE SERPL-MCNC: 3.7 MG/DL
PLATELET # BLD AUTO: 229 K/UL
PMV BLD AUTO: 10.3 FL
POTASSIUM SERPL-SCNC: 4.9 MMOL/L
POTASSIUM SERPL-SCNC: 4.9 MMOL/L
PROT SERPL-MCNC: 7.4 G/DL
PTH-INTACT SERPL-MCNC: 25 PG/ML
RBC # BLD AUTO: 4.68 M/UL
SODIUM SERPL-SCNC: 136 MMOL/L
SODIUM SERPL-SCNC: 136 MMOL/L
TRIGL SERPL-MCNC: 176 MG/DL
WBC # BLD AUTO: 6.1 K/UL

## 2019-01-10 PROCEDURE — 80061 LIPID PANEL: CPT

## 2019-01-10 PROCEDURE — 83735 ASSAY OF MAGNESIUM: CPT

## 2019-01-10 PROCEDURE — 80053 COMPREHEN METABOLIC PANEL: CPT

## 2019-01-10 PROCEDURE — 85025 COMPLETE CBC W/AUTO DIFF WBC: CPT

## 2019-01-10 PROCEDURE — 83970 ASSAY OF PARATHORMONE: CPT

## 2019-01-10 PROCEDURE — 80069 RENAL FUNCTION PANEL: CPT

## 2019-01-10 PROCEDURE — 36415 COLL VENOUS BLD VENIPUNCTURE: CPT | Mod: PO

## 2019-01-10 PROCEDURE — 84153 ASSAY OF PSA TOTAL: CPT

## 2019-01-14 DIAGNOSIS — Z95.1 S/P CABG X 3: ICD-10-CM

## 2019-01-14 DIAGNOSIS — Z95.5 S/P CORONARY ARTERY STENT PLACEMENT: ICD-10-CM

## 2019-01-14 DIAGNOSIS — I11.9 BENIGN HYPERTENSIVE HEART DISEASE WITHOUT HEART FAILURE: ICD-10-CM

## 2019-01-15 ENCOUNTER — OFFICE VISIT (OUTPATIENT)
Dept: NEPHROLOGY | Facility: CLINIC | Age: 84
End: 2019-01-15
Payer: MEDICARE

## 2019-01-15 VITALS
WEIGHT: 176.56 LBS | OXYGEN SATURATION: 96 % | DIASTOLIC BLOOD PRESSURE: 56 MMHG | HEIGHT: 61 IN | BODY MASS INDEX: 33.34 KG/M2 | SYSTOLIC BLOOD PRESSURE: 98 MMHG | HEART RATE: 53 BPM

## 2019-01-15 DIAGNOSIS — Z95.1 S/P CABG X 3: ICD-10-CM

## 2019-01-15 DIAGNOSIS — E03.9 HYPOTHYROIDISM, UNSPECIFIED TYPE: ICD-10-CM

## 2019-01-15 DIAGNOSIS — E78.2 MIXED HYPERLIPIDEMIA: ICD-10-CM

## 2019-01-15 DIAGNOSIS — Z95.5 S/P CORONARY ARTERY STENT PLACEMENT: ICD-10-CM

## 2019-01-15 DIAGNOSIS — N18.30 CKD (CHRONIC KIDNEY DISEASE) STAGE 3, GFR 30-59 ML/MIN: Primary | ICD-10-CM

## 2019-01-15 DIAGNOSIS — I10 BENIGN ESSENTIAL HYPERTENSION: ICD-10-CM

## 2019-01-15 DIAGNOSIS — I11.9 BENIGN HYPERTENSIVE HEART DISEASE WITHOUT HEART FAILURE: ICD-10-CM

## 2019-01-15 DIAGNOSIS — E66.9 OBESITY (BMI 30.0-34.9): ICD-10-CM

## 2019-01-15 DIAGNOSIS — I47.20 VENTRICULAR TACHYCARDIA: ICD-10-CM

## 2019-01-15 DIAGNOSIS — I25.10 CORONARY ARTERIOSCLEROSIS IN NATIVE ARTERY: ICD-10-CM

## 2019-01-15 DIAGNOSIS — N18.30 CKD (CHRONIC KIDNEY DISEASE), STAGE III: ICD-10-CM

## 2019-01-15 PROCEDURE — 99999 PR PBB SHADOW E&M-EST. PATIENT-LVL IV: ICD-10-PCS | Mod: PBBFAC,,, | Performed by: INTERNAL MEDICINE

## 2019-01-15 PROCEDURE — 99214 OFFICE O/P EST MOD 30 MIN: CPT | Mod: S$PBB,,, | Performed by: INTERNAL MEDICINE

## 2019-01-15 PROCEDURE — 99214 PR OFFICE/OUTPT VISIT, EST, LEVL IV, 30-39 MIN: ICD-10-PCS | Mod: S$PBB,,, | Performed by: INTERNAL MEDICINE

## 2019-01-15 PROCEDURE — 99999 PR PBB SHADOW E&M-EST. PATIENT-LVL IV: CPT | Mod: PBBFAC,,, | Performed by: INTERNAL MEDICINE

## 2019-01-15 PROCEDURE — 99214 OFFICE O/P EST MOD 30 MIN: CPT | Mod: PBBFAC,PO | Performed by: INTERNAL MEDICINE

## 2019-01-17 RX ORDER — METOPROLOL TARTRATE 25 MG/1
TABLET, FILM COATED ORAL
Qty: 90 TABLET | Refills: 1 | Status: SHIPPED | OUTPATIENT
Start: 2019-01-17 | End: 2019-06-03 | Stop reason: SDUPTHER

## 2019-02-03 NOTE — PROGRESS NOTES
Subjective:       Patient ID: Shashi Hassan is a 87 y.o. White male who presents for follow-up evaluation of Chronic Kidney Disease    HPI       He is doing well except constipation  No CP nor SOB. He received the flu vaccine    He denies foamy urine, no hematuria and no flank pain. He follows a low sodium diet and feels he stays hydrated. No LE edema and no SOB. No NSAID use and no herbal medications.     Review of Systems   Constitutional: Negative for activity change, appetite change, fatigue and unexpected weight change.   HENT: Negative for facial swelling.    Respiratory: Negative for cough and shortness of breath.    Cardiovascular: Negative for chest pain and leg swelling.   Gastrointestinal: Negative for abdominal distention.   Genitourinary: Negative for difficulty urinating, dysuria, frequency, hematuria and urgency.   Musculoskeletal: Negative for arthralgias.   Neurological: Negative for weakness and headaches.   Hematological: Does not bruise/bleed easily.   Psychiatric/Behavioral: Negative for decreased concentration.       Objective:      Physical Exam   Constitutional: He is oriented to person, place, and time. He appears well-developed and well-nourished. No distress.   Neck: No JVD present.   Cardiovascular: S1 normal and S2 normal. Exam reveals no friction rub.   Pulmonary/Chest: He has no wheezes. He has no rales.   Abdominal: Soft.   Musculoskeletal: He exhibits no edema.   Neurological: He is alert and oriented to person, place, and time.   Skin: Skin is warm and dry.   Psychiatric: He has a normal mood and affect.   Nursing note and vitals reviewed.      Assessment:       1. CKD (chronic kidney disease) stage 3, GFR 30-59 ml/min    2. CKD (chronic kidney disease), stage III    3. S/P CABG x 3    4. Mixed hyperlipidemia    5. S/P coronary artery stent placement    6. Benign hypertensive heart disease without heart failure    7. Benign essential hypertension    8. 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 Ventricular tachycardia    10. Obesity (BMI 30.0-34.9)    11. Hypothyroidism, unspecified type        Plan:             CKD Stage 3 with stable kidney function.  Continue RAAS blockade (sprinolactone) for renal preservation    BP uncomfortably low but he denies orthostasis    Mineral and Bone Disease--PTH and D level at goal. Stop calcium supplements and check Ca2 in 3 weeks.       RTC  5 months

## 2019-02-05 ENCOUNTER — LAB VISIT (OUTPATIENT)
Dept: LAB | Facility: HOSPITAL | Age: 84
End: 2019-02-05
Attending: INTERNAL MEDICINE
Payer: MEDICARE

## 2019-02-05 DIAGNOSIS — N18.30 CKD (CHRONIC KIDNEY DISEASE) STAGE 3, GFR 30-59 ML/MIN: ICD-10-CM

## 2019-02-05 LAB
ALBUMIN SERPL BCP-MCNC: 3.7 G/DL
ANION GAP SERPL CALC-SCNC: 8 MMOL/L
BUN SERPL-MCNC: 19 MG/DL
CALCIUM SERPL-MCNC: 9.6 MG/DL
CHLORIDE SERPL-SCNC: 102 MMOL/L
CO2 SERPL-SCNC: 30 MMOL/L
CREAT SERPL-MCNC: 1.4 MG/DL
EST. GFR  (AFRICAN AMERICAN): 51.9 ML/MIN/1.73 M^2
EST. GFR  (NON AFRICAN AMERICAN): 44.9 ML/MIN/1.73 M^2
GLUCOSE SERPL-MCNC: 111 MG/DL
PHOSPHATE SERPL-MCNC: 2.9 MG/DL
POTASSIUM SERPL-SCNC: 4.1 MMOL/L
SODIUM SERPL-SCNC: 140 MMOL/L

## 2019-02-05 PROCEDURE — 36415 COLL VENOUS BLD VENIPUNCTURE: CPT | Mod: PO

## 2019-02-05 PROCEDURE — 80069 RENAL FUNCTION PANEL: CPT

## 2019-02-07 ENCOUNTER — TELEPHONE (OUTPATIENT)
Dept: NEPHROLOGY | Facility: CLINIC | Age: 84
End: 2019-02-07

## 2019-02-07 NOTE — TELEPHONE ENCOUNTER
Please inform pt that after I recommended to stop his calcium supplements his calcium level remains normal. Thank you

## 2019-02-12 ENCOUNTER — OFFICE VISIT (OUTPATIENT)
Dept: FAMILY MEDICINE | Facility: CLINIC | Age: 84
End: 2019-02-12
Payer: MEDICARE

## 2019-02-12 VITALS
SYSTOLIC BLOOD PRESSURE: 101 MMHG | HEART RATE: 58 BPM | BODY MASS INDEX: 32.84 KG/M2 | WEIGHT: 173.94 LBS | DIASTOLIC BLOOD PRESSURE: 52 MMHG | HEIGHT: 61 IN | TEMPERATURE: 98 F | OXYGEN SATURATION: 96 %

## 2019-02-12 DIAGNOSIS — I47.20 VENTRICULAR TACHYCARDIA: ICD-10-CM

## 2019-02-12 DIAGNOSIS — E66.9 OBESITY (BMI 30.0-34.9): ICD-10-CM

## 2019-02-12 DIAGNOSIS — Z95.5 S/P CORONARY ARTERY STENT PLACEMENT: ICD-10-CM

## 2019-02-12 DIAGNOSIS — I10 BENIGN ESSENTIAL HYPERTENSION: Primary | ICD-10-CM

## 2019-02-12 DIAGNOSIS — E03.9 HYPOTHYROIDISM, UNSPECIFIED TYPE: ICD-10-CM

## 2019-02-12 DIAGNOSIS — I95.9 HYPOTENSION, UNSPECIFIED HYPOTENSION TYPE: ICD-10-CM

## 2019-02-12 DIAGNOSIS — N18.30 CKD (CHRONIC KIDNEY DISEASE), STAGE III: ICD-10-CM

## 2019-02-12 DIAGNOSIS — E78.2 MIXED HYPERLIPIDEMIA: ICD-10-CM

## 2019-02-12 DIAGNOSIS — Z95.1 S/P CABG X 3: ICD-10-CM

## 2019-02-12 DIAGNOSIS — R73.01 IMPAIRED FASTING GLUCOSE: ICD-10-CM

## 2019-02-12 PROCEDURE — 99213 OFFICE O/P EST LOW 20 MIN: CPT | Mod: PBBFAC,PN | Performed by: INTERNAL MEDICINE

## 2019-02-12 PROCEDURE — 99214 OFFICE O/P EST MOD 30 MIN: CPT | Mod: S$PBB,,, | Performed by: INTERNAL MEDICINE

## 2019-02-12 PROCEDURE — 99214 PR OFFICE/OUTPT VISIT, EST, LEVL IV, 30-39 MIN: ICD-10-PCS | Mod: S$PBB,,, | Performed by: INTERNAL MEDICINE

## 2019-02-12 PROCEDURE — 99999 PR PBB SHADOW E&M-EST. PATIENT-LVL III: ICD-10-PCS | Mod: PBBFAC,,, | Performed by: INTERNAL MEDICINE

## 2019-02-12 PROCEDURE — 99999 PR PBB SHADOW E&M-EST. PATIENT-LVL III: CPT | Mod: PBBFAC,,, | Performed by: INTERNAL MEDICINE

## 2019-02-12 RX ORDER — SPIRONOLACTONE 25 MG/1
TABLET ORAL
Qty: 45 TABLET | Refills: 1 | Status: SHIPPED | OUTPATIENT
Start: 2019-02-12 | End: 2019-08-15 | Stop reason: SDUPTHER

## 2019-02-12 NOTE — PATIENT INSTRUCTIONS
Benign essential hypertension; needs new BP cuff; to bring in for f/u; sit a few minutes before taking his BP readings. Stop aldactazide; or spironolactone-HCTZ. Add spironolactone 1/2 of 25 mg each morning.  -     spironolactone (ALDACTONE) 25 MG tablet; 1/2 of 25 mg po every morning.  Dispense: 45 tablet; Refill: 1  -     Basic metabolic panel; Future; Expected date: 02/12/2019  -     Magnesium; Future; Expected date: 02/12/2019  -     TSH; Future; Expected date: 02/12/2019  -     T4, free; Future; Expected date: 02/12/2019  -     T3, free; Future; Expected date: 02/12/2019    Hypotension, unspecified hypotension type; continue to push fluids during the day  -     spironolactone (ALDACTONE) 25 MG tablet; 1/2 of 25 mg po every morning.  Dispense: 45 tablet; Refill: 1  -     Basic metabolic panel; Future; Expected date: 02/12/2019  -     Magnesium; Future; Expected date: 02/12/2019  -     TSH; Future; Expected date: 02/12/2019  -     T4, free; Future; Expected date: 02/12/2019  -     T3, free; Future; Expected date: 02/12/2019    CKD (chronic kidney disease), stage III; sees nephrology as well; no NSAID agents; push fluids; can use tylenol for pain.   -     Basic metabolic panel; Future; Expected date: 02/12/2019  -     Magnesium; Future; Expected date: 02/12/2019    S/P CABG x 3; has been stable; sees Dr Garnica.     S/P coronary artery stent placement    Ventricular tachycardia; 2 week holter pending w cardiology; on amiodarone, and metoprolol.    Hypothyroidism, unspecified type; same thyroid dosing for now.   -     TSH; Future; Expected date: 02/12/2019  -     T4, free; Future; Expected date: 02/12/2019  -     T3, free; Future; Expected date: 02/12/2019    Mixed hyperlipidemia. Maintain low fat high fiber diet, exercise regularly. Cont atorvastatin.     Obesity (BMI 30.0-34.9). Caloric restriction w regular exercise and weight reduction.    Impaired fasting glucose. Exercise recommended with weight reduction and  low carb diet; we'll follow hemoglobin A1c's with you periodically.  -     Hemoglobin A1c; Future; Expected date: 02/12/2019

## 2019-02-12 NOTE — PROGRESS NOTES
Subjective:       Patient ID: Shashi Hassan is a 87 y.o. male.    Chief Complaint: Follow-up    HPI  Overall he;s doing well. Meds and labs reviewed w pt at length.  Mix HLP: on low fat high fiber diet; tolerates atorvastatin fine.  Hypothyroidism: takes his hypothyroid supplements appropriately. On levothyroxine/cytomel.  S/p CABG x3V: no CP, SOB, or palp. Card is Dr Garnica; just recently seen; to have 2 week holter in about 2 weeks. Hxc of VT of note. On amiodarone of note. On metoprolol as well. Also on ranexa, aldactazide, and losartan.  HTN: BP at home avr /70 w P 63; pt doesn't wait a few minutes sitting before reading BP and BP cuff >2 yrs old. Needs to obtain new cuff and wait a few minutes sitting before reading BP; bring new BP cuff w him to next visit to compare. Feeling well w present BP. BP check by me 101/52. Will stop aldactazide 1/2 of 25/25 and change to spironolactone 1/2 of 25 mg a day.  CKD-3:  Has been stable; Cr 1.6; GFR 38. Pushes fluids; knows not to take any NSAID agents; use tylenol for pain.   IFBS 107; to watch his carbohydrates. Total time: 820-900; >50% time spent on discussion, counseling, and review; Labs ordered for f/u.   Meds adjusted and prescribed.      Review of Systems   Constitutional: Negative for appetite change and unexpected weight change.   HENT: Negative for congestion, postnasal drip, rhinorrhea and sinus pressure.         Denies seasonal allergies, or perennial allergies   Eyes: Negative for discharge and itching.   Respiratory: Negative for cough, chest tightness, shortness of breath and wheezing.    Cardiovascular: Negative for chest pain, palpitations and leg swelling.   Gastrointestinal: Negative for abdominal pain, blood in stool, constipation, diarrhea, nausea and vomiting.   Endocrine: Negative for polydipsia, polyphagia and polyuria.   Genitourinary: Negative for dysuria and hematuria.   Musculoskeletal: Negative for arthralgias and myalgias.   Skin:  "Negative for rash.   Allergic/Immunologic: Negative for environmental allergies and food allergies.   Neurological: Negative for tremors, seizures and headaches.   Hematological: Negative for adenopathy. Does not bruise/bleed easily.   Psychiatric/Behavioral:        Denies anxiety or depression.       Objective:      Vitals:    02/12/19 0812 02/12/19 0839   BP: (!) 106/58 (!) 101/52   BP Location: Right arm    Patient Position: Sitting    BP Method: Medium (Manual)    Pulse: (!) 58    Temp: 97.9 °F (36.6 °C)    TempSrc: Oral    SpO2: 96%    Weight: 78.9 kg (173 lb 15.1 oz)    Height: 5' 1" (1.549 m)      Body mass index is 32.87 kg/m².    Physical Exam   Constitutional: He is oriented to person, place, and time. He appears well-developed and well-nourished.   HENT:   Head: Normocephalic and atraumatic.   Eyes: EOM are normal.   Neck: Normal range of motion. Neck supple. No thyromegaly present.   Cardiovascular: Normal rate, regular rhythm and normal heart sounds. Exam reveals no gallop.   No murmur heard.  Pulmonary/Chest: Effort normal and breath sounds normal. No respiratory distress. He has no wheezes. He has no rales.   Abdominal: Soft. Bowel sounds are normal. He exhibits no distension. There is no tenderness. There is no rebound and no guarding.   Musculoskeletal: Normal range of motion. He exhibits edema.   1-2+ LLE edema w 1+ RLE edema; spider veins noted; no calf tenderness to palp.   Lymphadenopathy:     He has no cervical adenopathy.   Neurological: He is alert and oriented to person, place, and time.   Moves all 4 extremities fine.   Skin: No rash noted.   Psychiatric: He has a normal mood and affect. His behavior is normal. Thought content normal.   Vitals reviewed.      Assessment:       1. Benign essential hypertension    2. Hypotension, unspecified hypotension type    3. CKD (chronic kidney disease), stage III    4. S/P CABG x 3    5. S/P coronary artery stent placement    6. Ventricular tachycardia  "   7. Hypothyroidism, unspecified type    8. Mixed hyperlipidemia    9. Obesity (BMI 30.0-34.9)    10. Impaired fasting glucose        Plan:       Benign essential hypertension; needs new BP cuff; to bring in for f/u; sit a few minutes before taking his BP readings. Stop aldactazide; or spironolactone-HCTZ. Add spironolactone 1/2 of 25 mg each morning.  -     spironolactone (ALDACTONE) 25 MG tablet; 1/2 of 25 mg po every morning.  Dispense: 45 tablet; Refill: 1  -     Basic metabolic panel; Future; Expected date: 02/12/2019  -     Magnesium; Future; Expected date: 02/12/2019  -     TSH; Future; Expected date: 02/12/2019  -     T4, free; Future; Expected date: 02/12/2019  -     T3, free; Future; Expected date: 02/12/2019    Hypotension, unspecified hypotension type; continue to push fluids during the day  -     spironolactone (ALDACTONE) 25 MG tablet; 1/2 of 25 mg po every morning.  Dispense: 45 tablet; Refill: 1  -     Basic metabolic panel; Future; Expected date: 02/12/2019  -     Magnesium; Future; Expected date: 02/12/2019  -     TSH; Future; Expected date: 02/12/2019  -     T4, free; Future; Expected date: 02/12/2019  -     T3, free; Future; Expected date: 02/12/2019    CKD (chronic kidney disease), stage III; sees nephrology as well; no NSAID agents; push fluids; can use tylenol for pain.   -     Basic metabolic panel; Future; Expected date: 02/12/2019  -     Magnesium; Future; Expected date: 02/12/2019    S/P CABG x 3; has been stable; sees Dr Garnica.     S/P coronary artery stent placement    Ventricular tachycardia; 2 week holter pending w cardiology; on amiodarone, and metoprolol.    Hypothyroidism, unspecified type; same thyroid dosing for now.   -     TSH; Future; Expected date: 02/12/2019  -     T4, free; Future; Expected date: 02/12/2019  -     T3, free; Future; Expected date: 02/12/2019    Mixed hyperlipidemia. Maintain low fat high fiber diet, exercise regularly. Cont atorvastatin.     Obesity (BMI  30.0-34.9). Caloric restriction w regular exercise and weight reduction.    Impaired fasting glucose. Exercise recommended with weight reduction and low carb diet; we'll follow hemoglobin A1c's with you periodically.  -     Hemoglobin A1c; Future; Expected date: 02/12/2019

## 2019-02-23 RX ORDER — LIOTHYRONINE SODIUM 5 UG/1
TABLET ORAL
Qty: 90 TABLET | Refills: 1 | Status: SHIPPED | OUTPATIENT
Start: 2019-02-23 | End: 2019-09-05 | Stop reason: SDUPTHER

## 2019-02-23 RX ORDER — LEVOTHYROXINE SODIUM 50 UG/1
TABLET ORAL
Qty: 90 TABLET | Refills: 1 | Status: SHIPPED | OUTPATIENT
Start: 2019-02-23 | End: 2019-04-02

## 2019-03-27 ENCOUNTER — LAB VISIT (OUTPATIENT)
Dept: LAB | Facility: HOSPITAL | Age: 84
End: 2019-03-27
Attending: INTERNAL MEDICINE
Payer: MEDICARE

## 2019-03-27 DIAGNOSIS — N18.30 CKD (CHRONIC KIDNEY DISEASE), STAGE III: ICD-10-CM

## 2019-03-27 DIAGNOSIS — I10 BENIGN ESSENTIAL HYPERTENSION: ICD-10-CM

## 2019-03-27 DIAGNOSIS — E03.9 HYPOTHYROIDISM, UNSPECIFIED TYPE: ICD-10-CM

## 2019-03-27 DIAGNOSIS — R73.01 IMPAIRED FASTING GLUCOSE: ICD-10-CM

## 2019-03-27 DIAGNOSIS — I95.9 HYPOTENSION, UNSPECIFIED HYPOTENSION TYPE: ICD-10-CM

## 2019-03-27 LAB
ANION GAP SERPL CALC-SCNC: 10 MMOL/L (ref 8–16)
BUN SERPL-MCNC: 19 MG/DL (ref 8–23)
CALCIUM SERPL-MCNC: 9.4 MG/DL (ref 8.7–10.5)
CHLORIDE SERPL-SCNC: 104 MMOL/L (ref 95–110)
CO2 SERPL-SCNC: 26 MMOL/L (ref 23–29)
CREAT SERPL-MCNC: 1.4 MG/DL (ref 0.5–1.4)
EST. GFR  (AFRICAN AMERICAN): 51.9 ML/MIN/1.73 M^2
EST. GFR  (NON AFRICAN AMERICAN): 44.9 ML/MIN/1.73 M^2
ESTIMATED AVG GLUCOSE: 111 MG/DL (ref 68–131)
GLUCOSE SERPL-MCNC: 103 MG/DL (ref 70–110)
HBA1C MFR BLD HPLC: 5.5 % (ref 4–5.6)
MAGNESIUM SERPL-MCNC: 2 MG/DL (ref 1.6–2.6)
POTASSIUM SERPL-SCNC: 4.2 MMOL/L (ref 3.5–5.1)
SODIUM SERPL-SCNC: 140 MMOL/L (ref 136–145)
T3FREE SERPL-MCNC: 2.5 PG/ML (ref 2.3–4.2)
T4 FREE SERPL-MCNC: 0.9 NG/DL (ref 0.71–1.51)
TSH SERPL DL<=0.005 MIU/L-ACNC: 3.59 UIU/ML (ref 0.4–4)

## 2019-03-27 PROCEDURE — 84439 ASSAY OF FREE THYROXINE: CPT

## 2019-03-27 PROCEDURE — 36415 COLL VENOUS BLD VENIPUNCTURE: CPT | Mod: PO

## 2019-03-27 PROCEDURE — 83036 HEMOGLOBIN GLYCOSYLATED A1C: CPT

## 2019-03-27 PROCEDURE — 80048 BASIC METABOLIC PNL TOTAL CA: CPT

## 2019-03-27 PROCEDURE — 84481 FREE ASSAY (FT-3): CPT

## 2019-03-27 PROCEDURE — 84443 ASSAY THYROID STIM HORMONE: CPT

## 2019-03-27 PROCEDURE — 83735 ASSAY OF MAGNESIUM: CPT

## 2019-04-01 ENCOUNTER — TELEPHONE (OUTPATIENT)
Dept: FAMILY MEDICINE | Facility: CLINIC | Age: 84
End: 2019-04-01

## 2019-04-01 NOTE — TELEPHONE ENCOUNTER
----- Message from Mitchell Mota MD sent at 3/30/2019 12:57 AM CDT -----  Please notify patient with gotten his lab results and they have been stable with slight worsening of his thyroid function tests which will discuss on follow-up; he continues to have mild kidney dysfunction so remind him to stay well hydrated during the day.  His kidney function is stable however; he is to avoid any NSAID agents like Aleve/Naprosyn or ibuprofen/Motrin/Advil.  He can use Tylenol for pain. Fasting glucose is minimally elevated at 103.  Please keep his follow-up appointment coming up shortly

## 2019-04-02 ENCOUNTER — OFFICE VISIT (OUTPATIENT)
Dept: FAMILY MEDICINE | Facility: CLINIC | Age: 84
End: 2019-04-02
Payer: MEDICARE

## 2019-04-02 VITALS
HEART RATE: 56 BPM | DIASTOLIC BLOOD PRESSURE: 60 MMHG | BODY MASS INDEX: 33.69 KG/M2 | TEMPERATURE: 98 F | WEIGHT: 178.44 LBS | SYSTOLIC BLOOD PRESSURE: 118 MMHG | OXYGEN SATURATION: 96 % | HEIGHT: 61 IN

## 2019-04-02 DIAGNOSIS — N18.30 CKD (CHRONIC KIDNEY DISEASE), STAGE III: ICD-10-CM

## 2019-04-02 DIAGNOSIS — R60.0 BILATERAL LEG EDEMA: ICD-10-CM

## 2019-04-02 DIAGNOSIS — E66.9 OBESITY (BMI 30.0-34.9): ICD-10-CM

## 2019-04-02 DIAGNOSIS — Z23 NEED FOR 23-POLYVALENT PNEUMOCOCCAL POLYSACCHARIDE VACCINE: ICD-10-CM

## 2019-04-02 DIAGNOSIS — E03.9 HYPOTHYROIDISM, UNSPECIFIED TYPE: ICD-10-CM

## 2019-04-02 DIAGNOSIS — Z95.1 S/P CABG X 3: ICD-10-CM

## 2019-04-02 DIAGNOSIS — I10 BENIGN ESSENTIAL HYPERTENSION: Primary | ICD-10-CM

## 2019-04-02 DIAGNOSIS — E78.2 MIXED HYPERLIPIDEMIA: ICD-10-CM

## 2019-04-02 DIAGNOSIS — Z95.5 S/P CORONARY ARTERY STENT PLACEMENT: ICD-10-CM

## 2019-04-02 PROCEDURE — 99214 OFFICE O/P EST MOD 30 MIN: CPT | Mod: S$PBB,25,, | Performed by: INTERNAL MEDICINE

## 2019-04-02 PROCEDURE — 99213 OFFICE O/P EST LOW 20 MIN: CPT | Mod: PBBFAC,PN,25 | Performed by: INTERNAL MEDICINE

## 2019-04-02 PROCEDURE — G0009 ADMIN PNEUMOCOCCAL VACCINE: HCPCS | Mod: PBBFAC,PN

## 2019-04-02 PROCEDURE — 99999 PR PBB SHADOW E&M-EST. PATIENT-LVL III: ICD-10-PCS | Mod: PBBFAC,,, | Performed by: INTERNAL MEDICINE

## 2019-04-02 PROCEDURE — 99999 PR PBB SHADOW E&M-EST. PATIENT-LVL III: CPT | Mod: PBBFAC,,, | Performed by: INTERNAL MEDICINE

## 2019-04-02 PROCEDURE — 99214 PR OFFICE/OUTPT VISIT, EST, LEVL IV, 30-39 MIN: ICD-10-PCS | Mod: S$PBB,25,, | Performed by: INTERNAL MEDICINE

## 2019-04-02 RX ORDER — LEVOTHYROXINE SODIUM 125 UG/1
TABLET ORAL
Qty: 50 TABLET | Refills: 1 | Status: SHIPPED | OUTPATIENT
Start: 2019-04-02 | End: 2019-07-09

## 2019-04-02 RX ORDER — TELMISARTAN 20 MG/1
20 TABLET ORAL DAILY
Qty: 90 TABLET | Refills: 1 | Status: SHIPPED | OUTPATIENT
Start: 2019-04-02 | End: 2019-09-27 | Stop reason: SDUPTHER

## 2019-04-02 NOTE — PROGRESS NOTES
Subjective:       Patient ID: Shashi Hassan is a 87 y.o. male.    Chief Complaint: Results (lab results and routine 6 week follow up )    HPI  Pt here for reassessment and go over his labs  Hypertension:  Last visit systolic blood pressure 106/58; patient was on Aldactazide half of 25/25 dose daily.  Changed last visit to spironolactone 1/2 of 25 mg dose daily and blood pressure today improved to 118/60. Feeling fine. Due to recall will change losartan to Micardis 20 mg a day. BP at home 118-120/60's. Watches his salt intake.       CKD-stage III; stable GFR at 44.9; potassium 4.2.    Mixed hyperlipidemia:  On low-fat high-fiber diet and tolerates atorvastatin fine. More exercise would help.     Coronary artery disease; status post coronary bypass x3 vessels; status post coronary artery stent placement x3;  No complaints of chest pain, shortness of breath, or palpitations. Cardiologist is Dr Garnica.     IFBS; ; HgA1C 5.5. Watches his carbs of note.   Obesity:  BMI 33.72 knows the importance of exercise and weight reduction with caloric restriction to help lose weight.    Hypothyroidism:  Takes his thyroid supplements appropriately; he is on both T3 and T4 supplementation; on amiodarone for arrhythmia/hx of VT.     Labs discussed w pt at length and labs ordered for f/u. Meds also addressed. Vaccines discussed; needs 2nd pneumococcal vaccine; PPSV-23 to be given today.     Total time: 9:10-10:04 am; >50% time spent on discussion, counseling, and review. Meds addressed; labs ordered for f/u.     Review of Systems   Constitutional: Negative for appetite change and unexpected weight change.   HENT: Negative for congestion, postnasal drip, rhinorrhea and sinus pressure.         Denies seasonal allergies, or perennial allergies   Eyes: Negative for discharge and itching.   Respiratory: Negative for cough, chest tightness, shortness of breath and wheezing.    Cardiovascular: Negative for chest pain, palpitations and  "leg swelling.   Gastrointestinal: Negative for abdominal pain, blood in stool, constipation, diarrhea, nausea and vomiting.   Endocrine: Negative for polydipsia, polyphagia and polyuria.   Genitourinary: Negative for dysuria and hematuria.   Musculoskeletal: Negative for arthralgias and myalgias.   Skin: Negative for rash.   Allergic/Immunologic: Negative for environmental allergies and food allergies.   Neurological: Negative for tremors, seizures and headaches.   Hematological: Negative for adenopathy. Does not bruise/bleed easily.   Psychiatric/Behavioral:        Denies anxiety or depression.       Objective:      Vitals:    04/02/19 0902   BP: 118/60   Pulse: (!) 56   Temp: 97.6 °F (36.4 °C)   TempSrc: Oral   SpO2: 96%   Weight: 81 kg (178 lb 7.4 oz)   Height: 5' 1" (1.549 m)     Body mass index is 33.72 kg/m².    Physical Exam   Constitutional: He is oriented to person, place, and time. He appears well-developed and well-nourished.   HENT:   Head: Normocephalic and atraumatic.   Eyes: EOM are normal.   Neck: Normal range of motion. Neck supple. No thyromegaly present.   No carotid bruits heard   Cardiovascular: Normal rate, regular rhythm and normal heart sounds. Exam reveals no gallop.   No murmur heard.  Pulmonary/Chest: Effort normal and breath sounds normal. No respiratory distress. He has no wheezes. He has no rales.   Abdominal: Soft. Bowel sounds are normal. He exhibits no distension. There is no tenderness. There is no rebound and no guarding.   Musculoskeletal: Normal range of motion. He exhibits edema.   2+ LLE and 1-2+ RLE edema w no calf tenderness to palp; spider veins noted.    Lymphadenopathy:     He has no cervical adenopathy.   Neurological: He is alert and oriented to person, place, and time.   Moves all 4 extremities fine.   Skin: No rash noted.   Psychiatric: He has a normal mood and affect. His behavior is normal. Thought content normal.   Vitals reviewed.      Assessment:       1. Benign " essential hypertension    2. CKD (chronic kidney disease), stage III    3. Mixed hyperlipidemia    4. S/P CABG x 3    5. S/P coronary artery stent placement    6. Bilateral leg edema    7. Hypothyroidism, unspecified type    8. Obesity (BMI 30.0-34.9)        Plan:       Benign essential hypertension; stop losartan due to recall; start micardis 20 mg a day in it's place. Maintain < 2 Gm Na a day diet, and monitor BP at home; keep a log.  -     telmisartan (MICARDIS) 20 MG Tab; Take 1 tablet (20 mg total) by mouth once daily.  Dispense: 90 tablet; Refill: 1  -     CBC auto differential; Future; Expected date: 04/02/2019  -     Comprehensive metabolic panel; Future; Expected date: 04/02/2019  -     T3, free; Future; Expected date: 04/02/2019  -     T4, free; Future; Expected date: 04/02/2019  -     TSH; Future; Expected date: 04/02/2019  -     Lipid panel; Future; Expected date: 04/02/2019  -     Magnesium; Future; Expected date: 04/02/2019    CKD (chronic kidney disease), stage III; push fluids during the day. No NSAID agents; can use tylenol for pain. Stop losartan.  -     telmisartan (MICARDIS) 20 MG Tab; Take 1 tablet (20 mg total) by mouth once daily.  Dispense: 90 tablet; Refill: 1  -     Comprehensive metabolic panel; Future; Expected date: 04/02/2019    Mixed hyperlipidemia.Maintain low fat high fiber diet, exercise regularly. Cont atorvastatin and fish oil.  -     Comprehensive metabolic panel; Future; Expected date: 04/02/2019  -     T3, free; Future; Expected date: 04/02/2019  -     T4, free; Future; Expected date: 04/02/2019  -     TSH; Future; Expected date: 04/02/2019  -     Lipid panel; Future; Expected date: 04/02/2019    S/P CABG x 3; sees Dr Garnica as cardiologist; has been stable. On amiodarone for arrhythmia; hx of VT; same treatment for CAD.   -     CBC auto differential; Future; Expected date: 04/02/2019  -     Comprehensive metabolic panel; Future; Expected date: 04/02/2019  -     T3, free;  Future; Expected date: 04/02/2019  -     T4, free; Future; Expected date: 04/02/2019  -     TSH; Future; Expected date: 04/02/2019  -     Lipid panel; Future; Expected date: 04/02/2019  -     Magnesium; Future; Expected date: 04/02/2019    S/P coronary artery stent placement x3; on ranexa.     Bilateral leg edema; on spironolactone low dose. Maintain less than 2 g sodium diet; elevate lower extremities at home; use compression stockings if possible.  -     Comprehensive metabolic panel; Future; Expected date: 04/02/2019  -     Magnesium; Future; Expected date: 04/02/2019    Hypothyroidism, unspecified type; continue cytomel 5 mcg a day; increase levothyroxine from 50 mcg a day to 1/2 of 125 mcg (62.5 mcg) a day. Stop levothyroxine 50 mcg a day once 1/2 of 125 mcg a day of levothyroxine is started.   -     levothyroxine (SYNTHROID) 125 MCG tablet; 1/2 of 125 mcg po daily.  Dispense: 50 tablet; Refill: 1  -     T3, free; Future; Expected date: 04/02/2019  -     T4, free; Future; Expected date: 04/02/2019  -     TSH; Future; Expected date: 04/02/2019    Obesity (BMI 30.0-34.9). Caloric restriction w regular exercise and weight reduction.  -     levothyroxine (SYNTHROID) 125 MCG tablet; 1/2 of 125 mcg po daily.  Dispense: 50 tablet; Refill: 1  -     Comprehensive metabolic panel; Future; Expected date: 04/02/2019  -     T3, free; Future; Expected date: 04/02/2019  -     T4, free; Future; Expected date: 04/02/2019  -     TSH; Future; Expected date: 04/02/2019  -     Lipid panel; Future; Expected date: 04/02/2019

## 2019-04-02 NOTE — PATIENT INSTRUCTIONS
Benign essential hypertension; stop losartan due to recall; start micardis 20 mg a day in it's place.   -     telmisartan (MICARDIS) 20 MG Tab; Take 1 tablet (20 mg total) by mouth once daily.  Dispense: 90 tablet; Refill: 1  -     CBC auto differential; Future; Expected date: 04/02/2019  -     Comprehensive metabolic panel; Future; Expected date: 04/02/2019  -     T3, free; Future; Expected date: 04/02/2019  -     T4, free; Future; Expected date: 04/02/2019  -     TSH; Future; Expected date: 04/02/2019  -     Lipid panel; Future; Expected date: 04/02/2019  -     Magnesium; Future; Expected date: 04/02/2019    CKD (chronic kidney disease), stage III; push fluids during the day. No NSAID agents; can use tylenol for pain.   -     telmisartan (MICARDIS) 20 MG Tab; Take 1 tablet (20 mg total) by mouth once daily.  Dispense: 90 tablet; Refill: 1  -     Comprehensive metabolic panel; Future; Expected date: 04/02/2019    Mixed hyperlipidemia.Maintain low fat high fiber diet, exercise regularly. Cont atorvastatin and fish oil.  -     Comprehensive metabolic panel; Future; Expected date: 04/02/2019  -     T3, free; Future; Expected date: 04/02/2019  -     T4, free; Future; Expected date: 04/02/2019  -     TSH; Future; Expected date: 04/02/2019  -     Lipid panel; Future; Expected date: 04/02/2019    S/P CABG x 3; sees Dr Garnica as cardiologist; has been stable. On amiodarone for arrhythmia; same treatment for CAD.   -     CBC auto differential; Future; Expected date: 04/02/2019  -     Comprehensive metabolic panel; Future; Expected date: 04/02/2019  -     T3, free; Future; Expected date: 04/02/2019  -     T4, free; Future; Expected date: 04/02/2019  -     TSH; Future; Expected date: 04/02/2019  -     Lipid panel; Future; Expected date: 04/02/2019  -     Magnesium; Future; Expected date: 04/02/2019    S/P coronary artery stent placement x3; on ranexa.     Bilateral leg edema; on spironolactone low dose. Maintain less than 2 g  sodium diet; elevate lower extremities at home; use compression stockings if possible.  -     Comprehensive metabolic panel; Future; Expected date: 04/02/2019  -     Magnesium; Future; Expected date: 04/02/2019    Hypothyroidism, unspecified type; cont cytomel 5 mcg a day; increase levothyroxine from 50 mcg a day to 1/2 of 125 mcg (62.5 mcg) a day. Stop levothyroxine 50 mcg a day once 1/2 of 125 mcg a day of levothyroxine is started.   -     levothyroxine (SYNTHROID) 125 MCG tablet; 1/2 of 125 mcg po daily.  Dispense: 50 tablet; Refill: 1  -     T3, free; Future; Expected date: 04/02/2019  -     T4, free; Future; Expected date: 04/02/2019  -     TSH; Future; Expected date: 04/02/2019    Obesity (BMI 30.0-34.9).Caloric restriction w regular exercise and weight reduction.  -     levothyroxine (SYNTHROID) 125 MCG tablet; 1/2 of 125 mcg po daily.  Dispense: 50 tablet; Refill: 1  -     Comprehensive metabolic panel; Future; Expected date: 04/02/2019  -     T3, free; Future; Expected date: 04/02/2019  -     T4, free; Future; Expected date: 04/02/2019  -     TSH; Future; Expected date: 04/02/2019  -     Lipid panel; Future; Expected date: 04/02/2019

## 2019-06-03 DIAGNOSIS — Z95.5 S/P CORONARY ARTERY STENT PLACEMENT: ICD-10-CM

## 2019-06-03 DIAGNOSIS — Z95.1 S/P CABG X 3: ICD-10-CM

## 2019-06-03 DIAGNOSIS — I11.9 BENIGN HYPERTENSIVE HEART DISEASE WITHOUT HEART FAILURE: ICD-10-CM

## 2019-06-04 RX ORDER — METOPROLOL TARTRATE 25 MG/1
TABLET, FILM COATED ORAL
Qty: 90 TABLET | Refills: 1 | Status: SHIPPED | OUTPATIENT
Start: 2019-06-04 | End: 2020-01-09 | Stop reason: SDUPTHER

## 2019-06-12 ENCOUNTER — LAB VISIT (OUTPATIENT)
Dept: LAB | Facility: HOSPITAL | Age: 84
End: 2019-06-12
Attending: INTERNAL MEDICINE
Payer: MEDICARE

## 2019-06-12 DIAGNOSIS — N18.30 CKD (CHRONIC KIDNEY DISEASE) STAGE 3, GFR 30-59 ML/MIN: ICD-10-CM

## 2019-06-12 LAB
BACTERIA #/AREA URNS HPF: NORMAL /HPF
BILIRUB UR QL STRIP: NEGATIVE
CLARITY UR: CLEAR
COLOR UR: YELLOW
CREAT UR-MCNC: 110 MG/DL (ref 23–375)
GLUCOSE UR QL STRIP: NEGATIVE
HGB UR QL STRIP: ABNORMAL
KETONES UR QL STRIP: NEGATIVE
LEUKOCYTE ESTERASE UR QL STRIP: NEGATIVE
MICROSCOPIC COMMENT: NORMAL
NITRITE UR QL STRIP: NEGATIVE
PH UR STRIP: 6 [PH] (ref 5–8)
PROT UR QL STRIP: NEGATIVE
PROT UR-MCNC: <7 MG/DL (ref 0–15)
PROT/CREAT UR: NORMAL MG/G{CREAT} (ref 0–0.2)
RBC #/AREA URNS HPF: 1 /HPF (ref 0–4)
SP GR UR STRIP: 1.02 (ref 1–1.03)
SQUAMOUS #/AREA URNS HPF: 1 /HPF
URN SPEC COLLECT METH UR: ABNORMAL
WBC #/AREA URNS HPF: 1 /HPF (ref 0–5)

## 2019-06-12 PROCEDURE — 82570 ASSAY OF URINE CREATININE: CPT

## 2019-06-12 PROCEDURE — 81000 URINALYSIS NONAUTO W/SCOPE: CPT | Mod: PO

## 2019-06-19 ENCOUNTER — OFFICE VISIT (OUTPATIENT)
Dept: NEPHROLOGY | Facility: CLINIC | Age: 84
End: 2019-06-19
Payer: MEDICARE

## 2019-06-19 VITALS
DIASTOLIC BLOOD PRESSURE: 54 MMHG | SYSTOLIC BLOOD PRESSURE: 110 MMHG | WEIGHT: 177.5 LBS | HEIGHT: 61 IN | OXYGEN SATURATION: 95 % | HEART RATE: 59 BPM | BODY MASS INDEX: 33.51 KG/M2

## 2019-06-19 DIAGNOSIS — N18.30 CKD (CHRONIC KIDNEY DISEASE), STAGE III: ICD-10-CM

## 2019-06-19 DIAGNOSIS — I11.9 BENIGN HYPERTENSIVE HEART DISEASE WITHOUT HEART FAILURE: ICD-10-CM

## 2019-06-19 DIAGNOSIS — I25.10 CORONARY ARTERIOSCLEROSIS IN NATIVE ARTERY: ICD-10-CM

## 2019-06-19 DIAGNOSIS — E66.9 OBESITY (BMI 30.0-34.9): ICD-10-CM

## 2019-06-19 DIAGNOSIS — N18.30 CKD (CHRONIC KIDNEY DISEASE) STAGE 3, GFR 30-59 ML/MIN: Primary | ICD-10-CM

## 2019-06-19 DIAGNOSIS — I10 BENIGN ESSENTIAL HYPERTENSION: ICD-10-CM

## 2019-06-19 DIAGNOSIS — Z95.1 S/P CABG X 3: ICD-10-CM

## 2019-06-19 DIAGNOSIS — E78.2 MIXED HYPERLIPIDEMIA: ICD-10-CM

## 2019-06-19 PROCEDURE — 99214 PR OFFICE/OUTPT VISIT, EST, LEVL IV, 30-39 MIN: ICD-10-PCS | Mod: S$PBB,,, | Performed by: INTERNAL MEDICINE

## 2019-06-19 PROCEDURE — 99999 PR PBB SHADOW E&M-EST. PATIENT-LVL IV: ICD-10-PCS | Mod: PBBFAC,,, | Performed by: INTERNAL MEDICINE

## 2019-06-19 PROCEDURE — 99214 OFFICE O/P EST MOD 30 MIN: CPT | Mod: PBBFAC,PO | Performed by: INTERNAL MEDICINE

## 2019-06-19 PROCEDURE — 99999 PR PBB SHADOW E&M-EST. PATIENT-LVL IV: CPT | Mod: PBBFAC,,, | Performed by: INTERNAL MEDICINE

## 2019-06-19 PROCEDURE — 99214 OFFICE O/P EST MOD 30 MIN: CPT | Mod: S$PBB,,, | Performed by: INTERNAL MEDICINE

## 2019-06-25 ENCOUNTER — PATIENT OUTREACH (OUTPATIENT)
Dept: ADMINISTRATIVE | Facility: HOSPITAL | Age: 84
End: 2019-06-25

## 2019-06-25 ENCOUNTER — LAB VISIT (OUTPATIENT)
Dept: LAB | Facility: HOSPITAL | Age: 84
End: 2019-06-25
Attending: INTERNAL MEDICINE
Payer: MEDICARE

## 2019-06-25 DIAGNOSIS — E03.9 HYPOTHYROIDISM, UNSPECIFIED TYPE: ICD-10-CM

## 2019-06-25 DIAGNOSIS — Z95.1 S/P CABG X 3: ICD-10-CM

## 2019-06-25 DIAGNOSIS — E66.9 OBESITY (BMI 30.0-34.9): ICD-10-CM

## 2019-06-25 DIAGNOSIS — E78.2 MIXED HYPERLIPIDEMIA: ICD-10-CM

## 2019-06-25 DIAGNOSIS — I10 BENIGN ESSENTIAL HYPERTENSION: ICD-10-CM

## 2019-06-25 DIAGNOSIS — R60.0 BILATERAL LEG EDEMA: ICD-10-CM

## 2019-06-25 DIAGNOSIS — N18.30 CKD (CHRONIC KIDNEY DISEASE), STAGE III: ICD-10-CM

## 2019-06-25 LAB
ALBUMIN SERPL BCP-MCNC: 3.7 G/DL (ref 3.5–5.2)
ALP SERPL-CCNC: 60 U/L (ref 55–135)
ALT SERPL W/O P-5'-P-CCNC: 22 U/L (ref 10–44)
ANION GAP SERPL CALC-SCNC: 8 MMOL/L (ref 8–16)
AST SERPL-CCNC: 19 U/L (ref 10–40)
BASOPHILS # BLD AUTO: 0.03 K/UL (ref 0–0.2)
BASOPHILS NFR BLD: 0.5 % (ref 0–1.9)
BILIRUB SERPL-MCNC: 0.4 MG/DL (ref 0.1–1)
BUN SERPL-MCNC: 19 MG/DL (ref 8–23)
CALCIUM SERPL-MCNC: 9.7 MG/DL (ref 8.7–10.5)
CHLORIDE SERPL-SCNC: 105 MMOL/L (ref 95–110)
CHOLEST SERPL-MCNC: 120 MG/DL (ref 120–199)
CHOLEST/HDLC SERPL: 3.2 {RATIO} (ref 2–5)
CO2 SERPL-SCNC: 27 MMOL/L (ref 23–29)
CREAT SERPL-MCNC: 1.6 MG/DL (ref 0.5–1.4)
DIFFERENTIAL METHOD: ABNORMAL
EOSINOPHIL # BLD AUTO: 0.1 K/UL (ref 0–0.5)
EOSINOPHIL NFR BLD: 2.2 % (ref 0–8)
ERYTHROCYTE [DISTWIDTH] IN BLOOD BY AUTOMATED COUNT: 13.1 % (ref 11.5–14.5)
EST. GFR  (AFRICAN AMERICAN): 44.1 ML/MIN/1.73 M^2
EST. GFR  (NON AFRICAN AMERICAN): 38.2 ML/MIN/1.73 M^2
GLUCOSE SERPL-MCNC: 97 MG/DL (ref 70–110)
HCT VFR BLD AUTO: 41.8 % (ref 40–54)
HDLC SERPL-MCNC: 38 MG/DL (ref 40–75)
HDLC SERPL: 31.7 % (ref 20–50)
HGB BLD-MCNC: 14.1 G/DL (ref 14–18)
IMM GRANULOCYTES # BLD AUTO: 0.03 K/UL (ref 0–0.04)
IMM GRANULOCYTES NFR BLD AUTO: 0.5 % (ref 0–0.5)
LDLC SERPL CALC-MCNC: 50.2 MG/DL (ref 63–159)
LYMPHOCYTES # BLD AUTO: 1.8 K/UL (ref 1–4.8)
LYMPHOCYTES NFR BLD: 27.7 % (ref 18–48)
MAGNESIUM SERPL-MCNC: 2 MG/DL (ref 1.6–2.6)
MCH RBC QN AUTO: 31.1 PG (ref 27–31)
MCHC RBC AUTO-ENTMCNC: 33.7 G/DL (ref 32–36)
MCV RBC AUTO: 92 FL (ref 82–98)
MONOCYTES # BLD AUTO: 0.4 K/UL (ref 0.3–1)
MONOCYTES NFR BLD: 6.6 % (ref 4–15)
NEUTROPHILS # BLD AUTO: 4 K/UL (ref 1.8–7.7)
NEUTROPHILS NFR BLD: 62.5 % (ref 38–73)
NONHDLC SERPL-MCNC: 82 MG/DL
NRBC BLD-RTO: 0 /100 WBC
PLATELET # BLD AUTO: 218 K/UL (ref 150–350)
PMV BLD AUTO: 10 FL (ref 9.2–12.9)
POTASSIUM SERPL-SCNC: 4.6 MMOL/L (ref 3.5–5.1)
PROT SERPL-MCNC: 7 G/DL (ref 6–8.4)
RBC # BLD AUTO: 4.53 M/UL (ref 4.6–6.2)
SODIUM SERPL-SCNC: 140 MMOL/L (ref 136–145)
T3FREE SERPL-MCNC: 2.1 PG/ML (ref 2.3–4.2)
T4 FREE SERPL-MCNC: 0.96 NG/DL (ref 0.71–1.51)
TRIGL SERPL-MCNC: 159 MG/DL (ref 30–150)
TSH SERPL DL<=0.005 MIU/L-ACNC: 3.38 UIU/ML (ref 0.4–4)
WBC # BLD AUTO: 6.32 K/UL (ref 3.9–12.7)

## 2019-06-25 PROCEDURE — 80061 LIPID PANEL: CPT

## 2019-06-25 PROCEDURE — 84481 FREE ASSAY (FT-3): CPT

## 2019-06-25 PROCEDURE — 36415 COLL VENOUS BLD VENIPUNCTURE: CPT | Mod: PO

## 2019-06-25 PROCEDURE — 83735 ASSAY OF MAGNESIUM: CPT

## 2019-06-25 PROCEDURE — 84439 ASSAY OF FREE THYROXINE: CPT

## 2019-06-25 PROCEDURE — 85025 COMPLETE CBC W/AUTO DIFF WBC: CPT

## 2019-06-25 PROCEDURE — 84443 ASSAY THYROID STIM HORMONE: CPT

## 2019-06-25 PROCEDURE — 80053 COMPREHEN METABOLIC PANEL: CPT

## 2019-07-08 ENCOUNTER — TELEPHONE (OUTPATIENT)
Dept: NEPHROLOGY | Facility: CLINIC | Age: 84
End: 2019-07-08

## 2019-07-08 NOTE — TELEPHONE ENCOUNTER
----- Message from Charo Garcia sent at 7/8/2019  9:47 AM CDT -----  Contact: Self  Type: Needs Medical Advice    Who Called:  patient  Symptoms (please be specific):  Phone call from primary doctor Dr Mota called him and said his kidney issue is getting worse. Dr Persaud told him thing were doing ok  Best Call Back Number: [phone]  Additional Information: He is confused because when he last saw Dr Persaud he was told that he didn't need to come back for 6 months and then he gets a emergency call from Dr Mota stating that his kidneys are getting worse. Call back to advise

## 2019-07-08 NOTE — TELEPHONE ENCOUNTER
Spoke with Mr Sonya, who seemed very anxious, and explained the lab showing the decline was drawn after his appointment with Dr. Persaud, but we should have her review this lab and advise.     He denies illness or new medication in the last 3 months.  Denies use of NSAIDS.      He has an appointment with Dr. Mota in the morning at 0930 and is hoping to hear back from us before that time or for Dr Persaud to communicate with Dr. Mota.

## 2019-07-08 NOTE — TELEPHONE ENCOUNTER
In reviewing Mr. Hassan's kidney function since March 2018 his creatinine has ranged 1.4-1.6mg/dL. His most recent creatinine was 1.6 and the one before that was 1.4. So the last two lab results are stable. But if comparing one to the next it appears it looks worse. So nothing to be concerned with

## 2019-07-09 ENCOUNTER — TELEPHONE (OUTPATIENT)
Dept: FAMILY MEDICINE | Facility: CLINIC | Age: 84
End: 2019-07-09

## 2019-07-09 ENCOUNTER — OFFICE VISIT (OUTPATIENT)
Dept: FAMILY MEDICINE | Facility: CLINIC | Age: 84
End: 2019-07-09
Payer: MEDICARE

## 2019-07-09 VITALS
DIASTOLIC BLOOD PRESSURE: 60 MMHG | SYSTOLIC BLOOD PRESSURE: 118 MMHG | WEIGHT: 176.69 LBS | OXYGEN SATURATION: 95 % | HEART RATE: 59 BPM | BODY MASS INDEX: 33.36 KG/M2 | HEIGHT: 61 IN

## 2019-07-09 DIAGNOSIS — N18.30 CKD (CHRONIC KIDNEY DISEASE), STAGE III: ICD-10-CM

## 2019-07-09 DIAGNOSIS — Z86.79 HISTORY OF VENTRICULAR TACHYCARDIA: ICD-10-CM

## 2019-07-09 DIAGNOSIS — Z79.899 ON AMIODARONE THERAPY: ICD-10-CM

## 2019-07-09 DIAGNOSIS — E03.9 HYPOTHYROIDISM, UNSPECIFIED TYPE: ICD-10-CM

## 2019-07-09 DIAGNOSIS — R60.0 BILATERAL LOWER EXTREMITY EDEMA: ICD-10-CM

## 2019-07-09 DIAGNOSIS — N28.9 ACUTE RENAL INSUFFICIENCY: ICD-10-CM

## 2019-07-09 DIAGNOSIS — I11.9 BENIGN HYPERTENSIVE HEART DISEASE WITHOUT HEART FAILURE: Primary | ICD-10-CM

## 2019-07-09 DIAGNOSIS — Z95.5 S/P CORONARY ARTERY STENT PLACEMENT: ICD-10-CM

## 2019-07-09 DIAGNOSIS — Z95.1 S/P CABG X 3: ICD-10-CM

## 2019-07-09 DIAGNOSIS — L57.0 ACTINIC KERATOSIS: Primary | ICD-10-CM

## 2019-07-09 DIAGNOSIS — L57.0 ACTINIC KERATOSIS: ICD-10-CM

## 2019-07-09 DIAGNOSIS — K59.00 CONSTIPATION, UNSPECIFIED CONSTIPATION TYPE: ICD-10-CM

## 2019-07-09 DIAGNOSIS — E78.2 MIXED HYPERLIPIDEMIA: ICD-10-CM

## 2019-07-09 DIAGNOSIS — E66.09 CLASS 1 OBESITY DUE TO EXCESS CALORIES WITH SERIOUS COMORBIDITY AND BODY MASS INDEX (BMI) OF 33.0 TO 33.9 IN ADULT: ICD-10-CM

## 2019-07-09 PROCEDURE — 99213 OFFICE O/P EST LOW 20 MIN: CPT | Mod: PBBFAC,PN | Performed by: INTERNAL MEDICINE

## 2019-07-09 PROCEDURE — 99215 PR OFFICE/OUTPT VISIT, EST, LEVL V, 40-54 MIN: ICD-10-PCS | Mod: S$PBB,,, | Performed by: INTERNAL MEDICINE

## 2019-07-09 PROCEDURE — 99999 PR PBB SHADOW E&M-EST. PATIENT-LVL III: ICD-10-PCS | Mod: PBBFAC,,, | Performed by: INTERNAL MEDICINE

## 2019-07-09 PROCEDURE — 99215 OFFICE O/P EST HI 40 MIN: CPT | Mod: S$PBB,,, | Performed by: INTERNAL MEDICINE

## 2019-07-09 PROCEDURE — 99999 PR PBB SHADOW E&M-EST. PATIENT-LVL III: CPT | Mod: PBBFAC,,, | Performed by: INTERNAL MEDICINE

## 2019-07-09 RX ORDER — LEVOTHYROXINE SODIUM 75 UG/1
75 TABLET ORAL DAILY
Qty: 45 TABLET | Refills: 1 | Status: SHIPPED | OUTPATIENT
Start: 2019-07-09 | End: 2020-01-13

## 2019-07-09 RX ORDER — LEVOTHYROXINE SODIUM 125 UG/1
TABLET ORAL
Qty: 45 TABLET | Refills: 1 | Status: SHIPPED | OUTPATIENT
Start: 2019-07-09 | End: 2020-07-21

## 2019-07-09 NOTE — PROGRESS NOTES
Subjective:       Patient ID: Shashi Hassan is a 87 y.o. male.    Chief Complaint: No chief complaint on file.    HPI  Here today for reassessment and go over his labs.  Benign hypertensive heart disease without heart failure:  Watches his salt intake; blood pressure here manually is controlled at 118/60; blood pressure at home has been averaging-  125/61  CKD stage 3:  Tries to keep well hydrated.  Knows not to use any NSAID agents; can use Tylenol for pain.  Mixed hyperlipidemia:  On low fat high-fiber diet; tolerates atorvastatin fine at 40 mg a day and Omega 3 fatty acids.  Hypothyroidism:  Takes his thyroid supplements appropriately.  On levothyroxine half of a 125 mcg p.o. daily and Cytomel generic 5 mcg daily  History of ventricular tachycardia:  On amiodarone 100 mg p.o. daily  Status post CABG x3; also with a history of coronary artery stent placement:  No complaints of chest pain, shortness of breath, or heart palpitations; his cardiologist is Dr Chase. On ASA 81 mg 3 a day and plavix generic.   Saroj LE edema; has been stable; some worsening in GFR; have requested pt to decrease spironalactone to 12.5 mg every other day; to watch his salt content in food; elevate legs at home; and use compression stockings; hasn't been using.   Suspected actinic keratosis lesion left hand; has been increasing in size; dermatology consult needed for evaluation and treatment.  Consult placed to Dr. TRISTEN Mauricio, dermatology.    Total time: 9009-6538; >50% time spent on discussion, counseling, and review. Labs ordered for f/u and meds addressed. Cardiology note from Dr Chase reviewed as well. PMH/surgical hx delineated and noted. SMH/FMH also delineated and noted ROS obtained at length prior to physical being performed. Meds and labs addressed. Consult to derm placed as well Dr TRISTEN Mauricio.  Case discussed with patient and his wife at length; multiple questions have been answered; plan of care for multiple different diagnosis has  "have been discussed as well.  Medications have been addressed and adjusted and ordered.  Labs reviewed at length in ordered for follow-up as well    Review of Systems   Constitutional: Negative for appetite change and unexpected weight change.   HENT: Negative for congestion, postnasal drip, rhinorrhea and sinus pressure.         Denies seasonal allergies, or perennial allergies   Eyes: Negative for discharge and itching.   Respiratory: Negative for cough, chest tightness, shortness of breath and wheezing.    Cardiovascular: Negative for chest pain, palpitations and leg swelling.   Gastrointestinal: Negative for abdominal pain, blood in stool, constipation, diarrhea, nausea and vomiting.        Occasional constipation. Bm's usually daily; now every 1-2 days. Has added softener 2x a day. Can add metamucil daily to 1-2x a day.    Endocrine: Negative for polydipsia, polyphagia and polyuria.   Genitourinary: Negative for dysuria and hematuria.   Musculoskeletal: Negative for arthralgias and myalgias.   Skin: Negative for rash.   Allergic/Immunologic: Negative for environmental allergies and food allergies.   Neurological: Negative for tremors, seizures and headaches.   Hematological: Negative for adenopathy. Does not bruise/bleed easily.   Psychiatric/Behavioral:        Denies anxiety or depression.       Objective:      Vitals:    07/09/19 0941   BP: 118/60   Pulse: (!) 59   SpO2: 95%   Weight: 80.2 kg (176 lb 11.2 oz)   Height: 5' 1" (1.549 m)     Body mass index is 33.39 kg/m².    Physical Exam   Constitutional: He is oriented to person, place, and time. He appears well-developed and well-nourished.   HENT:   Head: Normocephalic and atraumatic.   Eyes: EOM are normal.   Neck: Normal range of motion. Neck supple. No thyromegaly present.   Cardiovascular: Normal rate, regular rhythm and normal heart sounds. Exam reveals no gallop.   No murmur heard.  Pulmonary/Chest: Effort normal and breath sounds normal. No " respiratory distress. He has no wheezes. He has no rales.   Abdominal: Soft. Bowel sounds are normal. He exhibits no distension. There is no tenderness. There is no rebound and no guarding.   Musculoskeletal: Normal range of motion. He exhibits edema.   Left hand w actinic keratosis lesion; needs to see derm. Saroj LE 2+ edema; spider veins noted; no calf tenderness to palp.    Lymphadenopathy:     He has no cervical adenopathy.   Neurological: He is alert and oriented to person, place, and time.   Moves all 4 extremities fine.   Skin: No rash noted.   Psychiatric: He has a normal mood and affect. His behavior is normal. Thought content normal.   Vitals reviewed.      Assessment:       1. 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 CKD (chronic kidney disease), stage III    3. Acute renal insufficiency    4. Mixed hyperlipidemia    5. S/P CABG x 3    6. S/P coronary artery stent placement    7. History of ventricular tachycardia    8. On amiodarone therapy    9. Hypothyroidism, unspecified type    10. Bilateral lower extremity edema    11. Class 1 obesity due to excess calories with serious comorbidity and body mass index (BMI) of 33.0 to 33.9 in adult    12. Actinic keratosis    13. Constipation, unspecified constipation type        Plan:       Benign hypertensive heart disease without heart failure  -     T3, free; Future; Expected date: 07/09/2019  -     T4, free; Future; Expected date: 07/09/2019  -     TSH; Future; Expected date: 07/09/2019  -     Basic metabolic panel; Future; Expected date: 07/09/2019  -     Magnesium; Future; Expected date: 07/09/2019    CKD (chronic kidney disease), stage III  -     Basic metabolic panel; Future; Expected date: 07/09/2019  -     Magnesium; Future; Expected date: 07/09/2019  -     Urinalysis Microscopic; Future; Expected date: 08/09/2019  -     Urinalysis; Future; Expected date: 07/09/2019    Acute renal insufficiency; decrease spironolactone to 12.5 mg every  other day to help kidney function  -     Urinalysis Microscopic; Future; Expected date: 08/09/2019  -     Urinalysis; Future; Expected date: 07/09/2019    Mixed hyperlipidemia  -     T3, free; Future; Expected date: 07/09/2019  -     T4, free; Future; Expected date: 07/09/2019  -     TSH; Future; Expected date: 07/09/2019    S/P CABG x 3; sees Dr Chase; has been stable; has echo pending; Zio patch for 1 week as well.     S/P coronary artery stent placement    History of ventricular tachycardia; on amiodarone as per Dr Chase his cardiologist. Amiodarone level as per recent cardiology note reviewed as well.     On amiodarone therapy as per Dr Chase.   -     T3, free; Future; Expected date: 07/09/2019  -     T4, free; Future; Expected date: 07/09/2019  -     TSH; Future; Expected date: 07/09/2019    Hypothyroidism, unspecified type; increase levothyroxine to 62.5 mcg alternate w 75 mcg a day. Will help his constipation, weight and cholesterol.   -     levothyroxine (SYNTHROID) 125 MCG tablet; 1/2 of 125 mcg po every other day.  Dispense: 45 tablet; Refill: 1  -     levothyroxine (SYNTHROID) 75 MCG tablet; Take 1 tablet (75 mcg total) by mouth once daily. Take 75 mcg every other day w 62.5 mcg every other day in between  Dispense: 45 tablet; Refill: 1  -     T3, free; Future; Expected date: 07/09/2019  -     T4, free; Future; Expected date: 07/09/2019  -     TSH; Future; Expected date: 07/09/2019    Bilateral lower extremity edema; use compression stockings; watch his salt intake; decrease spironolactone to 12.5 mg every other day to try and help renal function. Elevate lower extremities at home.     Class 1 obesity due to excess calories with serious comorbidity and body mass index (BMI) of 33.0 to 33.9 in adult.Caloric restriction w regular exercise and weight reduction.  -     T3, free; Future; Expected date: 07/09/2019  -     T4, free; Future; Expected date: 07/09/2019  -     TSH; Future; Expected date:  07/09/2019    Actinic keratosis of left hand.   -     Ambulatory consult to Dermatology Dr TRISTEN Mauricio; r/o dermal carcinoma.  Lesion left hand has been increasing in size.    Constipation, unspecified constipation type; add metamucil 1-2x a day to help; miralax as needed for relief. Cont softener 2x a day.   -     levothyroxine (SYNTHROID) 125 MCG tablet; 1/2 of 125 mcg po every other day.  Dispense: 45 tablet; Refill: 1  -     levothyroxine (SYNTHROID) 75 MCG tablet; Take 1 tablet (75 mcg total) by mouth once daily. Take 75 mcg every other day w 62.5 mcg every other day in between  Dispense: 45 tablet; Refill: 1  -     T3, free; Future; Expected date: 07/09/2019  -     T4, free; Future; Expected date: 07/09/2019  -     TSH; Future; Expected date: 07/09/2019

## 2019-07-09 NOTE — PATIENT INSTRUCTIONS
Benign hypertensive heart disease without heart failure  -     T3, free; Future; Expected date: 07/09/2019  -     T4, free; Future; Expected date: 07/09/2019  -     TSH; Future; Expected date: 07/09/2019  -     Basic metabolic panel; Future; Expected date: 07/09/2019  -     Magnesium; Future; Expected date: 07/09/2019    CKD (chronic kidney disease), stage III  -     Basic metabolic panel; Future; Expected date: 07/09/2019  -     Magnesium; Future; Expected date: 07/09/2019  -     Urinalysis Microscopic; Future; Expected date: 08/09/2019  -     Urinalysis; Future; Expected date: 07/09/2019    Acute renal insufficiency; decrease spironolactone to 12.5 mg every other day to help kidney function  -     Urinalysis Microscopic; Future; Expected date: 08/09/2019  -     Urinalysis; Future; Expected date: 07/09/2019    Mixed hyperlipidemia  -     T3, free; Future; Expected date: 07/09/2019  -     T4, free; Future; Expected date: 07/09/2019  -     TSH; Future; Expected date: 07/09/2019    S/P CABG x 3; sees Dr Chase; has been stable; has echo pending; Zio patch for 1 week as well.     S/P coronary artery stent placement    History of ventricular tachycardia; on amiodarone as per Dr Chase his cardiologist.     On amiodarone therapy as per Dr Chase.   -     T3, free; Future; Expected date: 07/09/2019  -     T4, free; Future; Expected date: 07/09/2019  -     TSH; Future; Expected date: 07/09/2019    Hypothyroidism, unspecified type; increase levothyroxine to 62.5 mcg alternate w 75 mcg a day. Will help his constipation, weight and cholesterol.   -     levothyroxine (SYNTHROID) 125 MCG tablet; 1/2 of 125 mcg po every other day.  Dispense: 45 tablet; Refill: 1  -     levothyroxine (SYNTHROID) 75 MCG tablet; Take 1 tablet (75 mcg total) by mouth once daily. Take 75 mcg every other day w 62.5 mcg every other day in between  Dispense: 45 tablet; Refill: 1  -     T3, free; Future; Expected date: 07/09/2019  -     T4, free; Future;  Expected date: 07/09/2019  -     TSH; Future; Expected date: 07/09/2019    Bilateral lower extremity edema; use compression stockings; watch his salt intake; decrease spironolactone to 12.5 mg every other day to try and help renal function. Elevate lower extremities at home.     Class 1 obesity due to excess calories with serious comorbidity and body mass index (BMI) of 33.0 to 33.9 in adult.Caloric restriction w regular exercise and weight reduction.  -     T3, free; Future; Expected date: 07/09/2019  -     T4, free; Future; Expected date: 07/09/2019  -     TSH; Future; Expected date: 07/09/2019    Actinic keratosis of left hand.   -     Ambulatory consult to Dermatology Dr Berg; r/o dermal carcinoma.     Constipation, unspecified constipation type; add metamucil 1-2x a day to help; miralax as needed for relief. Cont softener 2x a day.   -     levothyroxine (SYNTHROID) 125 MCG tablet; 1/2 of 125 mcg po every other day.  Dispense: 45 tablet; Refill: 1  -     levothyroxine (SYNTHROID) 75 MCG tablet; Take 1 tablet (75 mcg total) by mouth once daily. Take 75 mcg every other day w 62.5 mcg every other day in between  Dispense: 45 tablet; Refill: 1  -     T3, free; Future; Expected date: 07/09/2019  -     T4, free; Future; Expected date: 07/09/2019  -     TSH; Future; Expected date: 07/09/2019

## 2019-08-15 DIAGNOSIS — I10 BENIGN ESSENTIAL HYPERTENSION: ICD-10-CM

## 2019-08-15 DIAGNOSIS — I95.9 HYPOTENSION, UNSPECIFIED HYPOTENSION TYPE: ICD-10-CM

## 2019-08-15 RX ORDER — SPIRONOLACTONE 25 MG/1
TABLET ORAL
Qty: 45 TABLET | Refills: 1 | Status: SHIPPED | OUTPATIENT
Start: 2019-08-15 | End: 2020-06-18

## 2019-09-03 ENCOUNTER — LAB VISIT (OUTPATIENT)
Dept: LAB | Facility: HOSPITAL | Age: 84
End: 2019-09-03
Attending: INTERNAL MEDICINE
Payer: MEDICARE

## 2019-09-03 DIAGNOSIS — N28.9 ACUTE RENAL INSUFFICIENCY: ICD-10-CM

## 2019-09-03 DIAGNOSIS — N18.30 CKD (CHRONIC KIDNEY DISEASE), STAGE III: ICD-10-CM

## 2019-09-03 LAB
BACTERIA #/AREA URNS HPF: NORMAL /HPF
BILIRUB UR QL STRIP: NEGATIVE
CLARITY UR: CLEAR
COLOR UR: YELLOW
GLUCOSE UR QL STRIP: NEGATIVE
HGB UR QL STRIP: NEGATIVE
KETONES UR QL STRIP: NEGATIVE
LEUKOCYTE ESTERASE UR QL STRIP: NEGATIVE
MICROSCOPIC COMMENT: NORMAL
NITRITE UR QL STRIP: NEGATIVE
PH UR STRIP: 6 [PH] (ref 5–8)
PROT UR QL STRIP: NEGATIVE
RBC #/AREA URNS HPF: 1 /HPF (ref 0–4)
SP GR UR STRIP: 1.02 (ref 1–1.03)
URN SPEC COLLECT METH UR: NORMAL
WBC #/AREA URNS HPF: 1 /HPF (ref 0–5)

## 2019-09-03 PROCEDURE — 81000 URINALYSIS NONAUTO W/SCOPE: CPT | Mod: PO

## 2019-09-06 RX ORDER — LIOTHYRONINE SODIUM 5 UG/1
TABLET ORAL
Qty: 90 TABLET | Refills: 1 | Status: SHIPPED | OUTPATIENT
Start: 2019-09-06 | End: 2020-03-18

## 2019-09-09 ENCOUNTER — OFFICE VISIT (OUTPATIENT)
Dept: FAMILY MEDICINE | Facility: CLINIC | Age: 84
End: 2019-09-09
Payer: MEDICARE

## 2019-09-09 VITALS
WEIGHT: 175.63 LBS | HEART RATE: 55 BPM | TEMPERATURE: 98 F | DIASTOLIC BLOOD PRESSURE: 55 MMHG | SYSTOLIC BLOOD PRESSURE: 110 MMHG | HEIGHT: 61 IN | BODY MASS INDEX: 33.16 KG/M2 | OXYGEN SATURATION: 96 %

## 2019-09-09 DIAGNOSIS — Z79.899 LONG-TERM USE OF HIGH-RISK MEDICATION: ICD-10-CM

## 2019-09-09 DIAGNOSIS — K59.00 CONSTIPATION, UNSPECIFIED CONSTIPATION TYPE: ICD-10-CM

## 2019-09-09 DIAGNOSIS — R60.0 BILATERAL LOWER EXTREMITY EDEMA: ICD-10-CM

## 2019-09-09 DIAGNOSIS — I47.20 VENTRICULAR TACHYCARDIA: ICD-10-CM

## 2019-09-09 DIAGNOSIS — E78.2 MIXED HYPERLIPIDEMIA: ICD-10-CM

## 2019-09-09 DIAGNOSIS — N18.30 CKD (CHRONIC KIDNEY DISEASE), STAGE III: ICD-10-CM

## 2019-09-09 DIAGNOSIS — I10 BENIGN ESSENTIAL HYPERTENSION: Primary | ICD-10-CM

## 2019-09-09 DIAGNOSIS — Z95.5 S/P CORONARY ARTERY STENT PLACEMENT: ICD-10-CM

## 2019-09-09 DIAGNOSIS — Z95.1 S/P CABG X 3: ICD-10-CM

## 2019-09-09 DIAGNOSIS — E03.9 HYPOTHYROIDISM, UNSPECIFIED TYPE: ICD-10-CM

## 2019-09-09 PROCEDURE — 99213 OFFICE O/P EST LOW 20 MIN: CPT | Mod: PBBFAC,PN | Performed by: INTERNAL MEDICINE

## 2019-09-09 PROCEDURE — 99214 OFFICE O/P EST MOD 30 MIN: CPT | Mod: S$PBB,,, | Performed by: INTERNAL MEDICINE

## 2019-09-09 PROCEDURE — 99214 PR OFFICE/OUTPT VISIT, EST, LEVL IV, 30-39 MIN: ICD-10-PCS | Mod: S$PBB,,, | Performed by: INTERNAL MEDICINE

## 2019-09-09 PROCEDURE — 99999 PR PBB SHADOW E&M-EST. PATIENT-LVL III: ICD-10-PCS | Mod: PBBFAC,,, | Performed by: INTERNAL MEDICINE

## 2019-09-09 PROCEDURE — 99999 PR PBB SHADOW E&M-EST. PATIENT-LVL III: CPT | Mod: PBBFAC,,, | Performed by: INTERNAL MEDICINE

## 2019-09-09 NOTE — PATIENT INSTRUCTIONS
Benign essential hypertension. Maintain < 2 Gm Na a day diet, and monitor BP at home; keep a log. Bring BP cuff in for comparison on follow up. Decrease spironolactone to 1/2 of 25 mg every other day. To try and help renal function and BP.   -     Lipid panel; Future; Expected date: 09/09/2019  -     Comprehensive metabolic panel; Future; Expected date: 09/09/2019  -     CBC auto differential; Future; Expected date: 09/09/2019  -     Magnesium; Future; Expected date: 09/09/2019    CKD (chronic kidney disease), stage III; keep well hydrated.   -     Comprehensive metabolic panel; Future; Expected date: 09/09/2019    Mixed hyperlipidemia.Maintain low fat high fiber diet, exercise regularly. Weight reduction where indicated. Cont atorvastatin.   -     Lipid panel; Future; Expected date: 09/09/2019  -     Comprehensive metabolic panel; Future; Expected date: 09/09/2019    S/P CABG x 3; has been stable; sees Dr Chase.   -     Lipid panel; Future; Expected date: 09/09/2019  -     Comprehensive metabolic panel; Future; Expected date: 09/09/2019  -     CBC auto differential; Future; Expected date: 09/09/2019  -     Magnesium; Future; Expected date: 09/09/2019    S/P coronary artery stent placement; as above. Same treatment.   -     Lipid panel; Future; Expected date: 09/09/2019  -     Comprehensive metabolic panel; Future; Expected date: 09/09/2019  -     CBC auto differential; Future; Expected date: 09/09/2019  -     Magnesium; Future; Expected date: 09/09/2019    Ventricular tachycardia; amiodarone recently increase slightly due to low levels to 200 mg qod alternate w 100 mg qod.   -     Comprehensive metabolic panel; Future; Expected date: 09/09/2019  -     CBC auto differential; Future; Expected date: 09/09/2019  -     Magnesium; Future; Expected date: 09/09/2019    Long-term use of high-risk medication; on amiodarone. Per Dr Chase.     Hypothyroidism, unspecified type; same thyroid dosing; on T3 and T4 supplements a  son amiodarone.    Constipation, unspecified constipation type; keeps well hydrated; on docusate softener and aloe verrae; fruits and prunes w needed.     Benign essential hypertension. Maintain < 2 Gm Na a day diet, and monitor BP at home; keep a log. Bring BP cuff in for comparison on follow up. Decrease spironolactone to 1/2 of 25 mg every other day. To try and help renal function and BP.   -     Lipid panel; Future; Expected date: 09/09/2019  -     Comprehensive metabolic panel; Future; Expected date: 09/09/2019  -     CBC auto differential; Future; Expected date: 09/09/2019  -     Magnesium; Future; Expected date: 09/09/2019    CKD (chronic kidney disease), stage III; keep well hydrated.   -     Comprehensive metabolic panel; Future; Expected date: 09/09/2019    Mixed hyperlipidemia.Maintain low fat high fiber diet, exercise regularly. Weight reduction where indicated. Cont atorvastatin.   -     Lipid panel; Future; Expected date: 09/09/2019  -     Comprehensive metabolic panel; Future; Expected date: 09/09/2019    S/P CABG x 3; has been stable; sees Dr Chase.   -     Lipid panel; Future; Expected date: 09/09/2019  -     Comprehensive metabolic panel; Future; Expected date: 09/09/2019  -     CBC auto differential; Future; Expected date: 09/09/2019  -     Magnesium; Future; Expected date: 09/09/2019    S/P coronary artery stent placement; as above. Same treatment.   -     Lipid panel; Future; Expected date: 09/09/2019  -     Comprehensive metabolic panel; Future; Expected date: 09/09/2019  -     CBC auto differential; Future; Expected date: 09/09/2019  -     Magnesium; Future; Expected date: 09/09/2019    Ventricular tachycardia; amiodarone recently increase slightly due to low levels to 200 mg qod alternate w 100 mg qod.   -     Comprehensive metabolic panel; Future; Expected date: 09/09/2019  -     CBC auto differential; Future; Expected date: 09/09/2019  -     Magnesium; Future; Expected date:  09/09/2019    Long-term use of high-risk medication; on amiodarone. Per Dr Chase.     Hypothyroidism, unspecified type; same thyroid dosing; on T3 and T4 supplements a son amiodarone.    Constipation, unspecified constipation type; keeps well hydrated; on docusate softener and aloe verrae; fruits and prunes w needed.     Benign essential hypertension. Maintain < 2 Gm Na a day diet, and monitor BP at home; keep a log. Bring BP cuff in for comparison on follow up. Decrease spironolactone to 1/2 of 25 mg every other day. To try and help renal function and BP.   -     Lipid panel; Future; Expected date: 09/09/2019  -     Comprehensive metabolic panel; Future; Expected date: 09/09/2019  -     CBC auto differential; Future; Expected date: 09/09/2019  -     Magnesium; Future; Expected date: 09/09/2019    CKD (chronic kidney disease), stage III; keep well hydrated.   -     Comprehensive metabolic panel; Future; Expected date: 09/09/2019    Mixed hyperlipidemia.Maintain low fat high fiber diet, exercise regularly. Weight reduction where indicated. Cont atorvastatin.   -     Lipid panel; Future; Expected date: 09/09/2019  -     Comprehensive metabolic panel; Future; Expected date: 09/09/2019    S/P CABG x 3; has been stable; sees Dr Chase.   -     Lipid panel; Future; Expected date: 09/09/2019  -     Comprehensive metabolic panel; Future; Expected date: 09/09/2019  -     CBC auto differential; Future; Expected date: 09/09/2019  -     Magnesium; Future; Expected date: 09/09/2019    S/P coronary artery stent placement; as above. Same treatment.   -     Lipid panel; Future; Expected date: 09/09/2019  -     Comprehensive metabolic panel; Future; Expected date: 09/09/2019  -     CBC auto differential; Future; Expected date: 09/09/2019  -     Magnesium; Future; Expected date: 09/09/2019    Ventricular tachycardia; amiodarone recently increase slightly due to low levels to 200 mg qod alternate w 100 mg qod.   -     Comprehensive  metabolic panel; Future; Expected date: 09/09/2019  -     CBC auto differential; Future; Expected date: 09/09/2019  -     Magnesium; Future; Expected date: 09/09/2019    Long-term use of high-risk medication; on amiodarone. Per Dr Chase.     Hypothyroidism, unspecified type; same thyroid dosing; on T3 and T4 supplements a son amiodarone.    Constipation, unspecified constipation type; keeps well hydrated; on docusate softener and aloe verrae; fruits and prunes w needed.     Bilateral lower extremity edema: Maintain less than 2 g sodium diet; elevate lower extremities at home; use compression stockings if possible.  Decrease spironolactone to 1/2 of 25 mg every other day; can tyake extra dose if needed.

## 2019-09-09 NOTE — PROGRESS NOTES
Subjective:       Patient ID: Shashi Hassan is a 87 y.o. male.    Chief Complaint: Follow-up (review lab results) and Medication Refill    HPI  Pt here today for reassessment and go over his labs drawn 09/03/2019.  Benign essential hypertension:  On less than 2 g sodium diet; blood pressure here manually 106/54; at home avr 143/70 at home; to bring in BP cuff for comparison reading. Has lost a pound. Decrease spironolactone to 12.5 mg every other day. BP by me 110/55.   CKD stage 3:  Knows to maintain good hydration during the day and not to take any NSAID agents like Aleve/Naprosyn or ibuprofen/Advil/Motrin.  Creatinine still 1.6 with GFR stable at 38; unsure if he decreased spironolactone to 12.5 mg qod; suspect he hasn't yet.   Mixed hyperlipidemia:  Tries to here to low-fat high-fiber diet; tolerates atorvastatin fine.  Status post 3 vessel coronary artery bypass graft/ history of coronary artery stent placement:  No complaints of chest pain, shortness of breath, or palpitations.  His cardiologist is-  Dr Chase is his cardiologist.   History of ventricular tachycardia:  Chronic use of high-risk medication; patient is on amiodarone 100 mg p.o.daily Alt w 200 mg a day as per Cardiology  Hypothyroidism:  Takes his thyroid supplementation appropriately.  Patient is on amiodarone.  Patient has thyroid supplements as levothyroxine and liothyronine.  Obesity:  BMI 33.18; exercise as tolerated with goal 4 to 5 times a week at 25-30 minutes; caloric restriction also to help bring his weight down.  Constipation: qod; docusate daily; cardiology added aloe verrae and helps; prunes and fruit for relief.   Labs reviewed and ordered for f/u; meds addressed.     Review of Systems   Constitutional: Negative for fatigue, fever and unexpected weight change.   HENT: Negative for congestion, postnasal drip and rhinorrhea.    Respiratory: Negative for cough, chest tightness, shortness of breath and wheezing.    Cardiovascular:  "Negative for chest pain, palpitations and leg swelling.   Gastrointestinal: Negative for abdominal pain, blood in stool, constipation, diarrhea, nausea and vomiting.   Endocrine: Negative for polydipsia, polyphagia and polyuria.   Genitourinary: Negative for dysuria and hematuria.   Musculoskeletal: Positive for arthralgias. Negative for myalgias.        Right wrist and knee pain recently; can use tylenol arthritis for pain. Cold applications for 2 days then thermal heat.    Allergic/Immunologic: Negative for environmental allergies and food allergies.   Neurological: Negative for seizures, syncope and weakness.   Hematological: Negative for adenopathy. Does not bruise/bleed easily.   Psychiatric/Behavioral: Negative for dysphoric mood. The patient is not nervous/anxious.        Objective:      Vitals:    09/09/19 1313 09/09/19 1336   BP: (!) 106/54 (!) 110/55   BP Location: Right arm    Patient Position: Sitting    BP Method: Large (Manual)    Pulse: (!) 55    Temp: 97.9 °F (36.6 °C)    TempSrc: Oral    SpO2: 96%    Weight: 79.6 kg (175 lb 9.5 oz)    Height: 5' 1" (1.549 m)      Body mass index is 33.18 kg/m².    Physical Exam   Constitutional: He is oriented to person, place, and time. He appears well-developed and well-nourished.   HENT:   Head: Normocephalic and atraumatic.   Eyes: EOM are normal.   Neck: Normal range of motion. Neck supple. No thyromegaly present.   Cardiovascular: Normal rate, regular rhythm and normal heart sounds. Exam reveals no gallop.   No murmur heard.  Pulmonary/Chest: Effort normal and breath sounds normal. No respiratory distress. He has no wheezes. He has no rales.   Abdominal: Soft. Bowel sounds are normal. He exhibits no distension. There is no tenderness. There is no rebound and no guarding.   Musculoskeletal: Normal range of motion. He exhibits edema.   2+micaela LE edema w spider veins; no calf tenderness to palp.    Lymphadenopathy:     He has no cervical adenopathy. "   Neurological: He is alert and oriented to person, place, and time.   Moves all 4 extremities fine.   Skin: No rash noted.   Psychiatric: He has a normal mood and affect. His behavior is normal. Thought content normal.   Vitals reviewed.      Assessment:       1. Benign essential hypertension    2. CKD (chronic kidney disease), stage III    3. Mixed hyperlipidemia    4. S/P CABG x 3    5. S/P coronary artery stent placement    6. Ventricular tachycardia    7. Long-term use of high-risk medication    8. Hypothyroidism, unspecified type    9. Constipation, unspecified constipation type    10. Bilateral lower extremity edema        Plan:       Benign essential hypertension. Maintain < 2 Gm Na a day diet, and monitor BP at home; keep a log. Bring BP cuff in for comparison on follow up. Decrease spironolactone to 1/2 of 25 mg every other day. To try and help renal function and BP.   -     Lipid panel; Future; Expected date: 09/09/2019  -     Comprehensive metabolic panel; Future; Expected date: 09/09/2019  -     CBC auto differential; Future; Expected date: 09/09/2019  -     Magnesium; Future; Expected date: 09/09/2019    CKD (chronic kidney disease), stage III; keep well hydrated.   -     Comprehensive metabolic panel; Future; Expected date: 09/09/2019    Mixed hyperlipidemia.Maintain low fat high fiber diet, exercise regularly. Weight reduction where indicated. Cont atorvastatin.   -     Lipid panel; Future; Expected date: 09/09/2019  -     Comprehensive metabolic panel; Future; Expected date: 09/09/2019    S/P CABG x 3; has been stable; sees Dr Chase.   -     Lipid panel; Future; Expected date: 09/09/2019  -     Comprehensive metabolic panel; Future; Expected date: 09/09/2019  -     CBC auto differential; Future; Expected date: 09/09/2019  -     Magnesium; Future; Expected date: 09/09/2019    S/P coronary artery stent placement; as above. Same treatment.   -     Lipid panel; Future; Expected date: 09/09/2019  -      Comprehensive metabolic panel; Future; Expected date: 09/09/2019  -     CBC auto differential; Future; Expected date: 09/09/2019  -     Magnesium; Future; Expected date: 09/09/2019    Ventricular tachycardia; amiodarone recently increase slightly due to low levels to 200 mg qod alternate w 100 mg qod.   -     Comprehensive metabolic panel; Future; Expected date: 09/09/2019  -     CBC auto differential; Future; Expected date: 09/09/2019  -     Magnesium; Future; Expected date: 09/09/2019    Long-term use of high-risk medication; on amiodarone. Per Dr Chase.     Hypothyroidism, unspecified type; same thyroid dosing; on T3 and T4 supplements a son amiodarone.    Constipation, unspecified constipation type; keeps well hydrated; on docusate softener and aloe verrae; fruits and prunes w needed.     Benign essential hypertension. Maintain < 2 Gm Na a day diet, and monitor BP at home; keep a log. Bring BP cuff in for comparison on follow up. Decrease spironolactone to 1/2 of 25 mg every other day. To try and help renal function and BP.   -     Lipid panel; Future; Expected date: 09/09/2019  -     Comprehensive metabolic panel; Future; Expected date: 09/09/2019  -     CBC auto differential; Future; Expected date: 09/09/2019  -     Magnesium; Future; Expected date: 09/09/2019    CKD (chronic kidney disease), stage III; keep well hydrated.   -     Comprehensive metabolic panel; Future; Expected date: 09/09/2019    Mixed hyperlipidemia.Maintain low fat high fiber diet, exercise regularly. Weight reduction where indicated. Cont atorvastatin.   -     Lipid panel; Future; Expected date: 09/09/2019  -     Comprehensive metabolic panel; Future; Expected date: 09/09/2019    S/P CABG x 3; has been stable; sees Dr Chase.   -     Lipid panel; Future; Expected date: 09/09/2019  -     Comprehensive metabolic panel; Future; Expected date: 09/09/2019  -     CBC auto differential; Future; Expected date: 09/09/2019  -     Magnesium; Future;  Expected date: 09/09/2019    S/P coronary artery stent placement; as above. Same treatment.   -     Lipid panel; Future; Expected date: 09/09/2019  -     Comprehensive metabolic panel; Future; Expected date: 09/09/2019  -     CBC auto differential; Future; Expected date: 09/09/2019  -     Magnesium; Future; Expected date: 09/09/2019    Ventricular tachycardia; amiodarone recently increase slightly due to low levels to 200 mg qod alternate w 100 mg qod.   -     Comprehensive metabolic panel; Future; Expected date: 09/09/2019  -     CBC auto differential; Future; Expected date: 09/09/2019  -     Magnesium; Future; Expected date: 09/09/2019    Long-term use of high-risk medication; on amiodarone. Per Dr Chase.     Hypothyroidism, unspecified type; same thyroid dosing; on T3 and T4 supplements a son amiodarone.    Constipation, unspecified constipation type; keeps well hydrated; on docusate softener and aloe verrae; fruits and prunes w needed.     Benign essential hypertension. Maintain < 2 Gm Na a day diet, and monitor BP at home; keep a log. Bring BP cuff in for comparison on follow up. Decrease spironolactone to 1/2 of 25 mg every other day. To try and help renal function and BP.   -     Lipid panel; Future; Expected date: 09/09/2019  -     Comprehensive metabolic panel; Future; Expected date: 09/09/2019  -     CBC auto differential; Future; Expected date: 09/09/2019  -     Magnesium; Future; Expected date: 09/09/2019    CKD (chronic kidney disease), stage III; keep well hydrated.   -     Comprehensive metabolic panel; Future; Expected date: 09/09/2019    Mixed hyperlipidemia.Maintain low fat high fiber diet, exercise regularly. Weight reduction where indicated. Cont atorvastatin.   -     Lipid panel; Future; Expected date: 09/09/2019  -     Comprehensive metabolic panel; Future; Expected date: 09/09/2019    S/P CABG x 3; has been stable; sees Dr Chase.   -     Lipid panel; Future; Expected date: 09/09/2019  -      Comprehensive metabolic panel; Future; Expected date: 09/09/2019  -     CBC auto differential; Future; Expected date: 09/09/2019  -     Magnesium; Future; Expected date: 09/09/2019    S/P coronary artery stent placement; as above. Same treatment.   -     Lipid panel; Future; Expected date: 09/09/2019  -     Comprehensive metabolic panel; Future; Expected date: 09/09/2019  -     CBC auto differential; Future; Expected date: 09/09/2019  -     Magnesium; Future; Expected date: 09/09/2019    Ventricular tachycardia; amiodarone recently increase slightly due to low levels to 200 mg qod alternate w 100 mg qod.   -     Comprehensive metabolic panel; Future; Expected date: 09/09/2019  -     CBC auto differential; Future; Expected date: 09/09/2019  -     Magnesium; Future; Expected date: 09/09/2019    Long-term use of high-risk medication; on amiodarone. Per Dr Chase.     Hypothyroidism, unspecified type; same thyroid dosing; on T3 and T4 supplements a son amiodarone.    Constipation, unspecified constipation type; keeps well hydrated; on docusate softener and aloe verrae; fruits and prunes w needed.     Bilateral lower extremity edema: Maintain less than 2 g sodium diet; elevate lower extremities at home; use compression stockings if possible.  Decrease spironolactone to 1/2 of 25 mg every other day; can tyake extra dose if needed.

## 2019-09-27 DIAGNOSIS — I10 BENIGN ESSENTIAL HYPERTENSION: ICD-10-CM

## 2019-09-27 DIAGNOSIS — N18.30 CKD (CHRONIC KIDNEY DISEASE), STAGE III: ICD-10-CM

## 2019-09-27 RX ORDER — TELMISARTAN 20 MG/1
TABLET ORAL
Qty: 90 TABLET | Refills: 1 | Status: SHIPPED | OUTPATIENT
Start: 2019-09-27 | End: 2020-04-02

## 2019-10-16 DIAGNOSIS — Z95.5 S/P CORONARY ARTERY STENT PLACEMENT: ICD-10-CM

## 2019-10-16 DIAGNOSIS — Z95.1 S/P CABG X 3: ICD-10-CM

## 2019-10-16 DIAGNOSIS — I11.9 BENIGN HYPERTENSIVE HEART DISEASE WITHOUT HEART FAILURE: ICD-10-CM

## 2019-10-17 RX ORDER — METOPROLOL TARTRATE 25 MG/1
TABLET, FILM COATED ORAL
Qty: 90 TABLET | Refills: 1 | OUTPATIENT
Start: 2019-10-17

## 2019-11-19 ENCOUNTER — OFFICE VISIT (OUTPATIENT)
Dept: FAMILY MEDICINE | Facility: CLINIC | Age: 84
End: 2019-11-19
Payer: MEDICARE

## 2019-11-19 VITALS
OXYGEN SATURATION: 97 % | HEART RATE: 53 BPM | BODY MASS INDEX: 32.67 KG/M2 | TEMPERATURE: 98 F | WEIGHT: 173.06 LBS | DIASTOLIC BLOOD PRESSURE: 50 MMHG | HEIGHT: 61 IN | SYSTOLIC BLOOD PRESSURE: 98 MMHG

## 2019-11-19 DIAGNOSIS — E03.9 HYPOTHYROIDISM, UNSPECIFIED TYPE: ICD-10-CM

## 2019-11-19 DIAGNOSIS — I11.9 BENIGN HYPERTENSIVE HEART DISEASE WITHOUT HEART FAILURE: ICD-10-CM

## 2019-11-19 DIAGNOSIS — R73.01 IMPAIRED FASTING GLUCOSE: ICD-10-CM

## 2019-11-19 DIAGNOSIS — E78.2 MIXED HYPERLIPIDEMIA: ICD-10-CM

## 2019-11-19 DIAGNOSIS — N18.30 CKD (CHRONIC KIDNEY DISEASE), STAGE III: ICD-10-CM

## 2019-11-19 DIAGNOSIS — Z23 NEED FOR TDAP VACCINATION: ICD-10-CM

## 2019-11-19 DIAGNOSIS — E66.9 OBESITY (BMI 30.0-34.9): ICD-10-CM

## 2019-11-19 DIAGNOSIS — Z95.1 S/P CABG X 3: ICD-10-CM

## 2019-11-19 DIAGNOSIS — I95.9 HYPOTENSION, UNSPECIFIED HYPOTENSION TYPE: Primary | ICD-10-CM

## 2019-11-19 DIAGNOSIS — R60.0 BILATERAL LOWER EXTREMITY EDEMA: ICD-10-CM

## 2019-11-19 DIAGNOSIS — Z95.5 S/P CORONARY ARTERY STENT PLACEMENT: ICD-10-CM

## 2019-11-19 PROCEDURE — 99214 PR OFFICE/OUTPT VISIT, EST, LEVL IV, 30-39 MIN: ICD-10-PCS | Mod: S$PBB,,, | Performed by: INTERNAL MEDICINE

## 2019-11-19 PROCEDURE — 99999 PR PBB SHADOW E&M-EST. PATIENT-LVL III: ICD-10-PCS | Mod: PBBFAC,,, | Performed by: INTERNAL MEDICINE

## 2019-11-19 PROCEDURE — 99213 OFFICE O/P EST LOW 20 MIN: CPT | Mod: PBBFAC,PN | Performed by: INTERNAL MEDICINE

## 2019-11-19 PROCEDURE — 99999 PR PBB SHADOW E&M-EST. PATIENT-LVL III: CPT | Mod: PBBFAC,,, | Performed by: INTERNAL MEDICINE

## 2019-11-19 PROCEDURE — 99214 OFFICE O/P EST MOD 30 MIN: CPT | Mod: S$PBB,,, | Performed by: INTERNAL MEDICINE

## 2019-11-19 NOTE — PROGRESS NOTES
Subjective:       Patient ID: Shashi Hassan is a 88 y.o. male.    Chief Complaint: Follow-up and Arthritis (rt knee, rt wrist)    HPI   patient here today for reassessment and go over his labs.  Case discussed at length with patient and his wife Amy who is present as well.      Benign essential hypertension:  Watches his salt intake; however today he is borderline hypotensive 98/50 manual blood pressure at home has been running 115/58 with a pulse of 65.     Hypotension:  Will change in daughter 15 mg every other day instead of daily and he is to take it on days in between his spironolactone.  This should help his blood pressure come up and his renal function.     Impaired fasting blood sugar:  ; 03/27/2019 hemoglobin A1c was 5.5.     CKD 3:  He keeps hydrated during the day; he knows not to use any NSA ID agents and use Tylenol for pain. Creatinine stable at 1.6; GFR stable at 37.9.     Hypothyroidism:  Takes his levothyroxine appropriately; he takes 62.5 mcg every other day alternating with 75 mcg of levothyroxine every other day in between.  Also on liothyronine 5 mcg daily     Mixed hyperlipidemia:  He is on low-fat high-fiber diet at least tries; tolerates atorvastatin fine and fish oil..     Lower extremity edema:  Has been stable he elevate his lower extremities at home at times; needs to resume compression stockings however during the daytime.     Total time 10:50 a.m. 3-11 40 3:00 a.m..  Greater than 50% of time spent in discussion, counseling, and review. Medications were reviewed and addressed as well. Labs were reviewed and ordered for follow-up.    Review of Systems   Constitutional: Negative for fever and unexpected weight change.   HENT: Negative for congestion, postnasal drip and rhinorrhea.    Respiratory: Negative for cough, chest tightness, shortness of breath and wheezing.    Cardiovascular: Negative for chest pain, palpitations and leg swelling.   Gastrointestinal: Negative for  "abdominal pain, blood in stool, constipation, diarrhea, nausea and vomiting.   Genitourinary: Negative for dysuria and hematuria.   Musculoskeletal: Negative for arthralgias and myalgias.   Skin: Negative for rash.   Allergic/Immunologic: Negative for environmental allergies and food allergies.   Neurological: Negative for syncope and weakness.   Hematological: Negative for adenopathy. Does not bruise/bleed easily.   Psychiatric/Behavioral: Negative for dysphoric mood. The patient is not nervous/anxious.        Objective:      Vitals:    11/19/19 1029   BP: (!) 98/50   BP Location: Left arm   Patient Position: Sitting   BP Method: Large (Manual)   Pulse: (!) 53   Temp: 97.7 °F (36.5 °C)   TempSrc: Oral   SpO2: 97%   Weight: 78.5 kg (173 lb 1 oz)   Height: 5' 1" (1.549 m)     Body mass index is 32.7 kg/m².    Physical Exam   Constitutional: He is oriented to person, place, and time. He appears well-developed and well-nourished.   HENT:   Head: Normocephalic and atraumatic.   Eyes: EOM are normal.   Neck: Normal range of motion. Neck supple. No thyromegaly present.   No carotid bruits heard   Cardiovascular: Normal rate, regular rhythm and normal heart sounds. Exam reveals no gallop.   No murmur heard.  Pulmonary/Chest: Effort normal and breath sounds normal. No respiratory distress. He has no wheezes. He has no rales.   Abdominal: Soft. Bowel sounds are normal. He exhibits no distension. There is no tenderness. There is no rebound and no guarding.   Musculoskeletal: Normal range of motion. He exhibits no edema.   Lymphadenopathy:     He has no cervical adenopathy.   Neurological: He is alert and oriented to person, place, and time.   Moves all 4 extremities fine.   Skin: No rash noted.   Psychiatric: He has a normal mood and affect. His behavior is normal. Thought content normal.   Vitals reviewed.      Assessment:       1. Hypotension, unspecified hypotension type    2. 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 CKD (chronic kidney disease), stage III    4. S/P CABG x 3    5. S/P coronary artery stent placement    6. Mixed hyperlipidemia    7. Hypothyroidism, unspecified type    8. Obesity (BMI 30.0-34.9)    9. Impaired fasting glucose        Plan:       Hypotension, unspecified hypotension type: decrease imdur to 15 mg every other day in between his spironolactone 12.5 mg every other day. May help GFR w BP increase as well.   -     Comprehensive metabolic panel; Future; Expected date: 11/19/2019    Benign hypertensive heart disease without heart failure; as above. Maintain < 2 Gm Na a day diet, and monitor BP at home; keep a log.  -     CBC auto differential; Future; Expected date: 11/19/2019  -     Comprehensive metabolic panel; Future; Expected date: 11/19/2019  -     T3, free; Future; Expected date: 11/19/2019  -     T4, free; Future; Expected date: 11/19/2019  -     TSH; Future; Expected date: 11/19/2019  -     Lipid panel; Future; Expected date: 11/19/2019  -     Magnesium; Future; Expected date: 11/19/2019  -     Urinalysis; Future; Expected date: 11/19/2019    CKD (chronic kidney disease), stage III; keep hydrated adequately. No NSAID agents; tylenol arthritis for pain.   -     CBC auto differential; Future; Expected date: 11/19/2019  -     Comprehensive metabolic panel; Future; Expected date: 11/19/2019    S/P CABG x 3; has been stable; sees Dr Chase as cardiologist.   -     CBC auto differential; Future; Expected date: 11/19/2019  -     Comprehensive metabolic panel; Future; Expected date: 11/19/2019  -     T3, free; Future; Expected date: 11/19/2019  -     T4, free; Future; Expected date: 11/19/2019  -     TSH; Future; Expected date: 11/19/2019  -     Lipid panel; Future; Expected date: 11/19/2019  -     Magnesium; Future; Expected date: 11/19/2019  -     Urinalysis; Future; Expected date: 11/19/2019    S/P coronary artery stent placement  -     CBC auto differential; Future; Expected date:  11/19/2019  -     Comprehensive metabolic panel; Future; Expected date: 11/19/2019  -     T3, free; Future; Expected date: 11/19/2019  -     T4, free; Future; Expected date: 11/19/2019  -     TSH; Future; Expected date: 11/19/2019  -     Lipid panel; Future; Expected date: 11/19/2019  -     Magnesium; Future; Expected date: 11/19/2019  -     Urinalysis; Future; Expected date: 11/19/2019    Mixed hyperlipidemia. Maintain low fat high fiber diet, exercise regularly. Weight reduction where indicated. Cont atorvastatin and fish oil. Lipid profile w f/u.   -     Comprehensive metabolic panel; Future; Expected date: 11/19/2019  -     T3, free; Future; Expected date: 11/19/2019  -     T4, free; Future; Expected date: 11/19/2019  -     TSH; Future; Expected date: 11/19/2019  -     Lipid panel; Future; Expected date: 11/19/2019    Hypothyroidism, unspecified type; same thyroid dosing for now; on amiodarone as well.   -     T3, free; Future; Expected date: 11/19/2019  -     T4, free; Future; Expected date: 11/19/2019  -     TSH; Future; Expected date: 11/19/2019    Obesity (BMI 30.0-34.9).Caloric restriction w regular exercise and weight reduction.  -     Comprehensive metabolic panel; Future; Expected date: 11/19/2019  -     T3, free; Future; Expected date: 11/19/2019  -     T4, free; Future; Expected date: 11/19/2019  -     TSH; Future; Expected date: 11/19/2019  -     Lipid panel; Future; Expected date: 11/19/2019    Bilateral lower extremity edema: Maintain less than 2 g sodium diet; elevate lower extremities at home; use compression stockings if possible. On spironolactone 12.5 mg every other day.     Impaired fast glucose: limit carbs; check HgA1C w f/u FBS as well as UA.    Tdap vaccine need: given script for Tdap vaccine to get at his pharmacist.

## 2019-11-19 NOTE — PATIENT INSTRUCTIONS
Hypotension, unspecified hypotension type: decrease imdur to 15 mg every other day in between his spironolactone 12.5 mg every other day. May help GFR w BP increase as well.   -     Comprehensive metabolic panel; Future; Expected date: 11/19/2019    Benign hypertensive heart disease without heart failure; as above. Maintain < 2 Gm Na a day diet, and monitor BP at home; keep a log.  -     CBC auto differential; Future; Expected date: 11/19/2019  -     Comprehensive metabolic panel; Future; Expected date: 11/19/2019  -     T3, free; Future; Expected date: 11/19/2019  -     T4, free; Future; Expected date: 11/19/2019  -     TSH; Future; Expected date: 11/19/2019  -     Lipid panel; Future; Expected date: 11/19/2019  -     Magnesium; Future; Expected date: 11/19/2019  -     Urinalysis; Future; Expected date: 11/19/2019    CKD (chronic kidney disease), stage III; keep hydrated adequately. No NSAID agents; tylenol arthritis for pain.   -     CBC auto differential; Future; Expected date: 11/19/2019  -     Comprehensive metabolic panel; Future; Expected date: 11/19/2019    S/P CABG x 3; has been stable; sees Dr Chase as cardiologist.   -     CBC auto differential; Future; Expected date: 11/19/2019  -     Comprehensive metabolic panel; Future; Expected date: 11/19/2019  -     T3, free; Future; Expected date: 11/19/2019  -     T4, free; Future; Expected date: 11/19/2019  -     TSH; Future; Expected date: 11/19/2019  -     Lipid panel; Future; Expected date: 11/19/2019  -     Magnesium; Future; Expected date: 11/19/2019  -     Urinalysis; Future; Expected date: 11/19/2019    S/P coronary artery stent placement  -     CBC auto differential; Future; Expected date: 11/19/2019  -     Comprehensive metabolic panel; Future; Expected date: 11/19/2019  -     T3, free; Future; Expected date: 11/19/2019  -     T4, free; Future; Expected date: 11/19/2019  -     TSH; Future; Expected date: 11/19/2019  -     Lipid panel; Future; Expected  date: 11/19/2019  -     Magnesium; Future; Expected date: 11/19/2019  -     Urinalysis; Future; Expected date: 11/19/2019    Mixed hyperlipidemia.Maintain low fat high fiber diet, exercise regularly. Weight reduction where indicated. Cont atorvastatin and fish oil. Lipid profile w f/u.   -     Comprehensive metabolic panel; Future; Expected date: 11/19/2019  -     T3, free; Future; Expected date: 11/19/2019  -     T4, free; Future; Expected date: 11/19/2019  -     TSH; Future; Expected date: 11/19/2019  -     Lipid panel; Future; Expected date: 11/19/2019    Hypothyroidism, unspecified type; same thyroid dosing for now; on amiodarone as well.   -     T3, free; Future; Expected date: 11/19/2019  -     T4, free; Future; Expected date: 11/19/2019  -     TSH; Future; Expected date: 11/19/2019    Obesity (BMI 30.0-34.9).Caloric restriction w regular exercise and weight reduction.  -     Comprehensive metabolic panel; Future; Expected date: 11/19/2019  -     T3, free; Future; Expected date: 11/19/2019  -     T4, free; Future; Expected date: 11/19/2019  -     TSH; Future; Expected date: 11/19/2019  -     Lipid panel; Future; Expected date: 11/19/2019    Impaired fast glucose: limit carbs; check HgA1C w f/u FBS as well as UA.

## 2019-12-16 ENCOUNTER — TELEPHONE (OUTPATIENT)
Dept: NEPHROLOGY | Facility: CLINIC | Age: 84
End: 2019-12-16

## 2019-12-16 NOTE — TELEPHONE ENCOUNTER
States he has another appointment at 10a that is close by.  Worried he would be late for 1140.  Offered reassurance and advised if something happens we can get him in at another time that is good for him.

## 2019-12-16 NOTE — TELEPHONE ENCOUNTER
----- Message from Roldan Ingram sent at 12/16/2019  8:53 AM CST -----  Contact: pt   Type: Needs Medical Advice    Who Called: pt   Symptoms (please be specific):    How long has patient had these symptoms:    Pharmacy name and phone #:    Best Call Back Number:    Additional Information: pt would like to keep his wednsday 12/18/19 appt but would like to change time due to another appt with another doctor. If not possible would like to keep the appt.

## 2019-12-18 ENCOUNTER — OFFICE VISIT (OUTPATIENT)
Dept: NEPHROLOGY | Facility: CLINIC | Age: 84
End: 2019-12-18
Payer: MEDICARE

## 2019-12-18 VITALS
SYSTOLIC BLOOD PRESSURE: 116 MMHG | WEIGHT: 171.75 LBS | BODY MASS INDEX: 32.42 KG/M2 | HEART RATE: 60 BPM | HEIGHT: 61 IN | DIASTOLIC BLOOD PRESSURE: 64 MMHG | OXYGEN SATURATION: 95 %

## 2019-12-18 DIAGNOSIS — N18.30 CKD (CHRONIC KIDNEY DISEASE), STAGE III: Primary | ICD-10-CM

## 2019-12-18 DIAGNOSIS — E55.9 VITAMIN D DEFICIENCY: ICD-10-CM

## 2019-12-18 DIAGNOSIS — R73.9 HYPERGLYCEMIA: ICD-10-CM

## 2019-12-18 DIAGNOSIS — E03.9 HYPOTHYROIDISM, UNSPECIFIED TYPE: ICD-10-CM

## 2019-12-18 DIAGNOSIS — E66.9 OBESITY (BMI 30.0-34.9): ICD-10-CM

## 2019-12-18 DIAGNOSIS — E78.2 MIXED HYPERLIPIDEMIA: ICD-10-CM

## 2019-12-18 DIAGNOSIS — I11.9 BENIGN HYPERTENSIVE HEART DISEASE WITHOUT HEART FAILURE: ICD-10-CM

## 2019-12-18 DIAGNOSIS — Z95.1 S/P CABG X 3: ICD-10-CM

## 2019-12-18 DIAGNOSIS — I25.10 CORONARY ARTERIOSCLEROSIS IN NATIVE ARTERY: ICD-10-CM

## 2019-12-18 DIAGNOSIS — I10 BENIGN ESSENTIAL HYPERTENSION: ICD-10-CM

## 2019-12-18 DIAGNOSIS — Z95.5 S/P CORONARY ARTERY STENT PLACEMENT: ICD-10-CM

## 2019-12-18 PROCEDURE — 1159F PR MEDICATION LIST DOCUMENTED IN MEDICAL RECORD: ICD-10-PCS | Mod: ,,, | Performed by: INTERNAL MEDICINE

## 2019-12-18 PROCEDURE — 99214 PR OFFICE/OUTPT VISIT, EST, LEVL IV, 30-39 MIN: ICD-10-PCS | Mod: S$PBB,,, | Performed by: INTERNAL MEDICINE

## 2019-12-18 PROCEDURE — 99999 PR PBB SHADOW E&M-EST. PATIENT-LVL IV: CPT | Mod: PBBFAC,,, | Performed by: INTERNAL MEDICINE

## 2019-12-18 PROCEDURE — 99214 OFFICE O/P EST MOD 30 MIN: CPT | Mod: PBBFAC,PO | Performed by: INTERNAL MEDICINE

## 2019-12-18 PROCEDURE — 1159F MED LIST DOCD IN RCRD: CPT | Mod: ,,, | Performed by: INTERNAL MEDICINE

## 2019-12-18 PROCEDURE — 99999 PR PBB SHADOW E&M-EST. PATIENT-LVL IV: ICD-10-PCS | Mod: PBBFAC,,, | Performed by: INTERNAL MEDICINE

## 2019-12-18 PROCEDURE — 99214 OFFICE O/P EST MOD 30 MIN: CPT | Mod: S$PBB,,, | Performed by: INTERNAL MEDICINE

## 2019-12-18 NOTE — PROGRESS NOTES
Subjective:       Patient ID: Shashi Hassan is a 88 y.o. White male who presents for follow-up evaluation of Chronic Kidney Disease    HPI     He is doing well except ongoping constipation  No CP nor SOB. He denies foamy urine, no hematuria and no flank pain. He follows a low sodium diet and feels he stays hydrated. No LE edema and no SOB. No NSAID use and no herbal medications.     Interval history Dec 2019: doing well. Finally has relief from constipation. LE is the same, no increase of SOB    Review of Systems   Constitutional: Negative for activity change, appetite change, fatigue and unexpected weight change.   HENT: Negative for facial swelling.    Respiratory: Negative for cough and shortness of breath.    Cardiovascular: Negative for chest pain and leg swelling.   Gastrointestinal: Negative for abdominal distention.   Genitourinary: Negative for difficulty urinating, dysuria, frequency, hematuria and urgency.   Musculoskeletal: Negative for arthralgias.   Neurological: Negative for weakness and headaches.   Hematological: Does not bruise/bleed easily.   Psychiatric/Behavioral: Negative for decreased concentration.       Objective:      Physical Exam   Constitutional: He is oriented to person, place, and time. He appears well-developed and well-nourished. No distress.   HENT:   Mouth/Throat: Oropharynx is clear and moist.   Eyes: No scleral icterus.   Neck: No JVD present.   Cardiovascular: S1 normal and S2 normal. Exam reveals no friction rub.   Pulmonary/Chest: He has no wheezes. He has no rales.   Abdominal: Soft.   Musculoskeletal: He exhibits edema.   Neurological: He is alert and oriented to person, place, and time.   Skin: Skin is warm and dry.   Psychiatric: He has a normal mood and affect.   Nursing note and vitals reviewed.      Assessment:       1. CKD (chronic kidney disease), stage III    2. Benign essential hypertension    3. Vitamin D deficiency    4. S/P CABG x 3    5. Coronary  arteriosclerosis in native artery    6. Hypothyroidism, unspecified type    7. Hyperglycemia    8. Benign hypertensive heart disease without heart failure    9. Mixed hyperlipidemia    10. Obesity (BMI 30.0-34.9)    11. S/P coronary artery stent placement        Plan:             CKD Stage 3 with stable kidney function.  Continue RAAS blockade (sprinolactone) for renal preservation    BP low but he denies frequent orthostasis.     Mineral and Bone Disease--PTH and D level at goal. Calcium is stable        RTC 5 mo

## 2020-01-09 DIAGNOSIS — Z95.5 S/P CORONARY ARTERY STENT PLACEMENT: ICD-10-CM

## 2020-01-09 DIAGNOSIS — Z95.1 S/P CABG X 3: ICD-10-CM

## 2020-01-09 DIAGNOSIS — I11.9 BENIGN HYPERTENSIVE HEART DISEASE WITHOUT HEART FAILURE: ICD-10-CM

## 2020-01-09 NOTE — TELEPHONE ENCOUNTER
Last ov- 11/19/2019  Future ov- 03/02/2020  Last refill- 08/16/2019    Please review and advise. Thank you.

## 2020-01-10 RX ORDER — METOPROLOL TARTRATE 25 MG/1
25 TABLET, FILM COATED ORAL DAILY
Qty: 90 TABLET | Refills: 0 | Status: SHIPPED | OUTPATIENT
Start: 2020-01-10 | End: 2020-03-20

## 2020-01-13 DIAGNOSIS — E03.9 HYPOTHYROIDISM, UNSPECIFIED TYPE: ICD-10-CM

## 2020-01-13 DIAGNOSIS — K59.00 CONSTIPATION, UNSPECIFIED CONSTIPATION TYPE: ICD-10-CM

## 2020-01-13 RX ORDER — LEVOTHYROXINE SODIUM 75 UG/1
TABLET ORAL
Qty: 45 TABLET | Refills: 3 | Status: SHIPPED | OUTPATIENT
Start: 2020-01-13 | End: 2020-09-03 | Stop reason: SDUPTHER

## 2020-02-24 ENCOUNTER — LAB VISIT (OUTPATIENT)
Dept: LAB | Facility: HOSPITAL | Age: 85
End: 2020-02-24
Attending: INTERNAL MEDICINE
Payer: MEDICARE

## 2020-02-24 DIAGNOSIS — I11.9 BENIGN HYPERTENSIVE HEART DISEASE WITHOUT HEART FAILURE: ICD-10-CM

## 2020-02-24 DIAGNOSIS — E78.2 MIXED HYPERLIPIDEMIA: ICD-10-CM

## 2020-02-24 DIAGNOSIS — I95.9 HYPOTENSION, UNSPECIFIED HYPOTENSION TYPE: ICD-10-CM

## 2020-02-24 DIAGNOSIS — Z95.5 S/P CORONARY ARTERY STENT PLACEMENT: ICD-10-CM

## 2020-02-24 DIAGNOSIS — E03.9 HYPOTHYROIDISM, UNSPECIFIED TYPE: ICD-10-CM

## 2020-02-24 DIAGNOSIS — N18.30 CKD (CHRONIC KIDNEY DISEASE), STAGE III: ICD-10-CM

## 2020-02-24 DIAGNOSIS — E66.9 OBESITY (BMI 30.0-34.9): ICD-10-CM

## 2020-02-24 DIAGNOSIS — Z95.1 S/P CABG X 3: ICD-10-CM

## 2020-02-24 DIAGNOSIS — R73.01 IMPAIRED FASTING GLUCOSE: ICD-10-CM

## 2020-02-24 LAB
ALBUMIN SERPL BCP-MCNC: 3.8 G/DL (ref 3.5–5.2)
ALP SERPL-CCNC: 60 U/L (ref 55–135)
ALT SERPL W/O P-5'-P-CCNC: 21 U/L (ref 10–44)
ANION GAP SERPL CALC-SCNC: 8 MMOL/L (ref 8–16)
AST SERPL-CCNC: 19 U/L (ref 10–40)
BASOPHILS # BLD AUTO: 0.02 K/UL (ref 0–0.2)
BASOPHILS NFR BLD: 0.3 % (ref 0–1.9)
BILIRUB SERPL-MCNC: 0.3 MG/DL (ref 0.1–1)
BUN SERPL-MCNC: 19 MG/DL (ref 8–23)
CALCIUM SERPL-MCNC: 9.3 MG/DL (ref 8.7–10.5)
CHLORIDE SERPL-SCNC: 106 MMOL/L (ref 95–110)
CHOLEST SERPL-MCNC: 116 MG/DL (ref 120–199)
CHOLEST/HDLC SERPL: 2.8 {RATIO} (ref 2–5)
CO2 SERPL-SCNC: 27 MMOL/L (ref 23–29)
CREAT SERPL-MCNC: 1.4 MG/DL (ref 0.5–1.4)
DIFFERENTIAL METHOD: ABNORMAL
EOSINOPHIL # BLD AUTO: 0.1 K/UL (ref 0–0.5)
EOSINOPHIL NFR BLD: 1.6 % (ref 0–8)
ERYTHROCYTE [DISTWIDTH] IN BLOOD BY AUTOMATED COUNT: 12.8 % (ref 11.5–14.5)
EST. GFR  (AFRICAN AMERICAN): 51.5 ML/MIN/1.73 M^2
EST. GFR  (NON AFRICAN AMERICAN): 44.5 ML/MIN/1.73 M^2
ESTIMATED AVG GLUCOSE: 108 MG/DL (ref 68–131)
GLUCOSE SERPL-MCNC: 106 MG/DL (ref 70–110)
HBA1C MFR BLD HPLC: 5.4 % (ref 4–5.6)
HCT VFR BLD AUTO: 42.7 % (ref 40–54)
HDLC SERPL-MCNC: 41 MG/DL (ref 40–75)
HDLC SERPL: 35.3 % (ref 20–50)
HGB BLD-MCNC: 13.5 G/DL (ref 14–18)
IMM GRANULOCYTES # BLD AUTO: 0.03 K/UL (ref 0–0.04)
IMM GRANULOCYTES NFR BLD AUTO: 0.4 % (ref 0–0.5)
LDLC SERPL CALC-MCNC: 52.8 MG/DL (ref 63–159)
LYMPHOCYTES # BLD AUTO: 1.8 K/UL (ref 1–4.8)
LYMPHOCYTES NFR BLD: 25.2 % (ref 18–48)
MAGNESIUM SERPL-MCNC: 1.9 MG/DL (ref 1.6–2.6)
MCH RBC QN AUTO: 30.3 PG (ref 27–31)
MCHC RBC AUTO-ENTMCNC: 31.6 G/DL (ref 32–36)
MCV RBC AUTO: 96 FL (ref 82–98)
MONOCYTES # BLD AUTO: 0.6 K/UL (ref 0.3–1)
MONOCYTES NFR BLD: 8.3 % (ref 4–15)
NEUTROPHILS # BLD AUTO: 4.5 K/UL (ref 1.8–7.7)
NEUTROPHILS NFR BLD: 64.2 % (ref 38–73)
NONHDLC SERPL-MCNC: 75 MG/DL
NRBC BLD-RTO: 0 /100 WBC
PLATELET # BLD AUTO: 237 K/UL (ref 150–350)
PMV BLD AUTO: 10.5 FL (ref 9.2–12.9)
POTASSIUM SERPL-SCNC: 4.8 MMOL/L (ref 3.5–5.1)
PROT SERPL-MCNC: 7 G/DL (ref 6–8.4)
RBC # BLD AUTO: 4.45 M/UL (ref 4.6–6.2)
SODIUM SERPL-SCNC: 141 MMOL/L (ref 136–145)
T3FREE SERPL-MCNC: 2.6 PG/ML (ref 2.3–4.2)
T4 FREE SERPL-MCNC: 1 NG/DL (ref 0.71–1.51)
TRIGL SERPL-MCNC: 111 MG/DL (ref 30–150)
TSH SERPL DL<=0.005 MIU/L-ACNC: 3.41 UIU/ML (ref 0.4–4)
WBC # BLD AUTO: 7.01 K/UL (ref 3.9–12.7)

## 2020-02-24 PROCEDURE — 80061 LIPID PANEL: CPT

## 2020-02-24 PROCEDURE — 84481 FREE ASSAY (FT-3): CPT

## 2020-02-24 PROCEDURE — 80053 COMPREHEN METABOLIC PANEL: CPT

## 2020-02-24 PROCEDURE — 84439 ASSAY OF FREE THYROXINE: CPT

## 2020-02-24 PROCEDURE — 84443 ASSAY THYROID STIM HORMONE: CPT

## 2020-02-24 PROCEDURE — 36415 COLL VENOUS BLD VENIPUNCTURE: CPT | Mod: PO

## 2020-02-24 PROCEDURE — 85025 COMPLETE CBC W/AUTO DIFF WBC: CPT

## 2020-02-24 PROCEDURE — 83735 ASSAY OF MAGNESIUM: CPT

## 2020-02-24 PROCEDURE — 83036 HEMOGLOBIN GLYCOSYLATED A1C: CPT

## 2020-03-02 ENCOUNTER — OFFICE VISIT (OUTPATIENT)
Dept: FAMILY MEDICINE | Facility: CLINIC | Age: 85
End: 2020-03-02
Payer: MEDICARE

## 2020-03-02 VITALS
SYSTOLIC BLOOD PRESSURE: 116 MMHG | TEMPERATURE: 98 F | WEIGHT: 171.44 LBS | HEIGHT: 61 IN | HEART RATE: 52 BPM | BODY MASS INDEX: 32.37 KG/M2 | DIASTOLIC BLOOD PRESSURE: 58 MMHG | OXYGEN SATURATION: 98 %

## 2020-03-02 DIAGNOSIS — H92.02 LEFT EAR PAIN: ICD-10-CM

## 2020-03-02 DIAGNOSIS — R73.01 IMPAIRED FASTING BLOOD SUGAR: ICD-10-CM

## 2020-03-02 DIAGNOSIS — E03.9 HYPOTHYROIDISM, UNSPECIFIED TYPE: ICD-10-CM

## 2020-03-02 DIAGNOSIS — E66.9 OBESITY (BMI 30.0-34.9): ICD-10-CM

## 2020-03-02 DIAGNOSIS — J30.2 SEASONAL ALLERGIES: ICD-10-CM

## 2020-03-02 DIAGNOSIS — E78.2 MIXED HYPERLIPIDEMIA: ICD-10-CM

## 2020-03-02 DIAGNOSIS — N18.30 CKD (CHRONIC KIDNEY DISEASE), STAGE III: ICD-10-CM

## 2020-03-02 DIAGNOSIS — Z95.1 S/P CABG X 3: ICD-10-CM

## 2020-03-02 DIAGNOSIS — Z95.5 S/P CORONARY ARTERY STENT PLACEMENT: ICD-10-CM

## 2020-03-02 DIAGNOSIS — H69.92 DYSFUNCTION OF LEFT EUSTACHIAN TUBE: ICD-10-CM

## 2020-03-02 DIAGNOSIS — I47.20 VENTRICULAR TACHYCARDIA: ICD-10-CM

## 2020-03-02 DIAGNOSIS — I10 BENIGN ESSENTIAL HYPERTENSION: Primary | ICD-10-CM

## 2020-03-02 PROCEDURE — 99999 PR PBB SHADOW E&M-EST. PATIENT-LVL III: CPT | Mod: PBBFAC,,, | Performed by: INTERNAL MEDICINE

## 2020-03-02 PROCEDURE — 99999 PR PBB SHADOW E&M-EST. PATIENT-LVL III: ICD-10-PCS | Mod: PBBFAC,,, | Performed by: INTERNAL MEDICINE

## 2020-03-02 PROCEDURE — 99214 OFFICE O/P EST MOD 30 MIN: CPT | Mod: S$PBB,,, | Performed by: INTERNAL MEDICINE

## 2020-03-02 PROCEDURE — 99214 PR OFFICE/OUTPT VISIT, EST, LEVL IV, 30-39 MIN: ICD-10-PCS | Mod: S$PBB,,, | Performed by: INTERNAL MEDICINE

## 2020-03-02 PROCEDURE — 99213 OFFICE O/P EST LOW 20 MIN: CPT | Mod: PBBFAC,PN | Performed by: INTERNAL MEDICINE

## 2020-03-02 NOTE — PROGRESS NOTES
Subjective:       Patient ID: Shashi Hassan is a 88 y.o. male.    Chief Complaint: Follow-up (review lab results) and Otalgia (left ear feels like shocks inner ear which radiates to head left side )    HPI  patient here to go over his labs and for reassessment and that would like his otalgia evaluated     Essential hypertension:  Watches his salt intake; blood pressure at home averaging 112/57 with pulse 65; blood pressure here is 116/58 with pulse of 52.     CKD stage 3:  Keeps well hydrated; knows not to take any NSA ID agents; can use Tylenol for pain. GFR has improved from 37.9, to presently 44.5.  Creatinine has improved from 1.6, to presently now 1.4.     History of 3 vessel CABG and 3 coronary stents:  No complaints of chest pain shortness of breath or heart palpitations.  Cardiologist is Dr. Chase.     History of ventricular tachycardia:  On amiodarone therapy as per Cardiology Dr. Chase.     Mixed hyperlipidemia:  On low-fat high-fiber diet or least tries; tolerates atorvastatin supplements fine.  Total cholesterol 116/triglyceride 111/HDL 41/LDL 52.8.  Goal less than 60 for LDL     Hypothyroidism:  Takes his levothyroxine appropriately; on amiodarone; alternate levothyroxine 75 mcg every other day with 62.5 g every other day in between.  Also on Cytomel 5 mcg tabs daily.  TSH minimally elevated at 3.4, free T3 normal at 2.6, free T4 normal at 1.0.     Impaired fasting blood sugar:  FBS is 106 with hemoglobin A1c 5.4; knows to restrict his carbohydrates and exercise and try and lose weight.     Obesity:  BMI 32.39; ideally goal for exercise 4 to 5 times a week minimum 25-30 minutes along with small portions to help his weight come down.    Otalgia left ear:  Patient with no cough or fever but has been sneezing.  No congestion, postnasal drip, watery nose, or sore throat noted.  Has been having left ear pain lately which started Friday.  Seasonal allergies present.  No colored phlegm noted myalgias or  "arthralgias or fatigue..     Labs reviewed with patient and ordered for follow-up.  Medications reviewed and addressed as well    Review of Systems   Constitutional: Negative for fever and unexpected weight change.   HENT: Positive for sneezing. Negative for congestion, postnasal drip, rhinorrhea and sore throat.         Left otalgia; occ sneezing.    Respiratory: Negative for cough, chest tightness, shortness of breath and wheezing.    Cardiovascular: Negative for chest pain, palpitations and leg swelling.   Gastrointestinal: Negative for abdominal pain, blood in stool, constipation, diarrhea, nausea and vomiting.   Endocrine: Negative for polydipsia, polyphagia and polyuria.   Genitourinary: Negative for dysuria and hematuria.   Musculoskeletal: Negative for arthralgias and myalgias.   Skin: Negative for rash.   Allergic/Immunologic: Negative for environmental allergies and food allergies.   Neurological: Negative for syncope and weakness.   Psychiatric/Behavioral: Negative for dysphoric mood. The patient is not nervous/anxious.        Objective:      Vitals:    03/02/20 1030   BP: (!) 116/58   BP Location: Left arm   Patient Position: Sitting   BP Method: Medium (Manual)   Pulse: (!) 52   Temp: 97.6 °F (36.4 °C)   TempSrc: Oral   SpO2: 98%   Weight: 77.7 kg (171 lb 6.5 oz)   Height: 5' 1" (1.549 m)     Body mass index is 32.39 kg/m².  Wt Readings from Last 3 Encounters:   03/02/20 77.7 kg (171 lb 6.5 oz)   12/18/19 77.9 kg (171 lb 11.8 oz)   11/19/19 78.5 kg (173 lb 1 oz)        Physical Exam   Constitutional: He is oriented to person, place, and time. He appears well-developed and well-nourished.   HENT:   Head: Normocephalic and atraumatic.   NM swollen; left inflamed ; white-yel mucus micaela. Throat is pink. TM's congested.    Eyes: EOM are normal.   Neck: Normal range of motion. Neck supple. No thyromegaly present.   Cardiovascular: Normal rate, regular rhythm and normal heart sounds. Exam reveals no gallop. "   No murmur heard.  Pulmonary/Chest: Effort normal and breath sounds normal. No respiratory distress. He has no wheezes. He has no rales.   Abdominal: Soft. Bowel sounds are normal. He exhibits no distension. There is no tenderness. There is no rebound and no guarding.   Musculoskeletal: Normal range of motion. He exhibits no edema.   Lymphadenopathy:     He has no cervical adenopathy.   Neurological: He is alert and oriented to person, place, and time.   Moves all 4 extremities fine.   Skin: No rash noted.   Psychiatric: He has a normal mood and affect. His behavior is normal. Thought content normal.   Vitals reviewed.      Assessment:       1. Benign essential hypertension    2. CKD (chronic kidney disease), stage III    3. Mixed hyperlipidemia    4. S/P CABG x 3    5. S/P coronary artery stent placement    6. Ventricular tachycardia    7. Hypothyroidism, unspecified type    8. Obesity (BMI 30.0-34.9)    9. Impaired fasting blood sugar    10. Left ear pain    11. Dysfunction of left eustachian tube    12. Seasonal allergies        Plan:       Benign essential hypertension.Maintain < 2 Gm Na a day diet, and monitor BP at home; keep a log.  -     Comprehensive metabolic panel; Future; Expected date: 03/02/2020  -     CBC auto differential; Future; Expected date: 03/02/2020  -     T3, free; Future; Expected date: 03/02/2020  -     T4, free; Future; Expected date: 03/02/2020  -     TSH; Future; Expected date: 03/02/2020  -     Lipid panel; Future; Expected date: 03/02/2020  -     Magnesium; Future; Expected date: 03/02/2020  -     Hemoglobin A1c; Future; Expected date: 03/02/2020  -     Urinalysis; Future; Expected date: 03/02/2020    CKD (chronic kidney disease), stage III; keep well hydrated; NO NSAID agents.   -     Comprehensive metabolic panel; Future; Expected date: 03/02/2020  -     Urinalysis; Future; Expected date: 03/02/2020    Mixed hyperlipidemia. Maintain low fat high fiber diet, exercise regularly.  Weight reduction where indicated. Cont atorvastatin.   -     Comprehensive metabolic panel; Future; Expected date: 03/02/2020  -     Lipid panel; Future; Expected date: 03/02/2020    S/P CABG x 3; has been stable; sees Dr Chase as cardiologist.   -     Comprehensive metabolic panel; Future; Expected date: 03/02/2020  -     Lipid panel; Future; Expected date: 03/02/2020  -     Hemoglobin A1c; Future; Expected date: 03/02/2020    S/P coronary artery stent placement; as above; same tx.     Ventricular tachycardia; on amiodarone; pt w some WESTFALL noted. Has been stable; to f/u w Dr Chase mid-March 2020; to bring it up then.   -     Comprehensive metabolic panel; Future; Expected date: 03/02/2020  -     CBC auto differential; Future; Expected date: 03/02/2020  -     T3, free; Future; Expected date: 03/02/2020  -     T4, free; Future; Expected date: 03/02/2020  -     TSH; Future; Expected date: 03/02/2020  -     Magnesium; Future; Expected date: 03/02/2020    Hypothyroidism, unspecified type; same thyroid dosing.   -     T3, free; Future; Expected date: 03/02/2020  -     T4, free; Future; Expected date: 03/02/2020  -     TSH; Future; Expected date: 03/02/2020    Obesity (BMI 30.0-34.9). Caloric restriction w regular exercise and weight reduction.  -     Comprehensive metabolic panel; Future; Expected date: 03/02/2020  -     T3, free; Future; Expected date: 03/02/2020  -     T4, free; Future; Expected date: 03/02/2020  -     TSH; Future; Expected date: 03/02/2020  -     Hemoglobin A1c; Future; Expected date: 03/02/2020    Impaired fasting blood sugar. Exercise recommended with weight reduction and low carb diet; we'll follow hemoglobin A1c's with you periodically.  -     Comprehensive metabolic panel; Future; Expected date: 03/02/2020  -     Hemoglobin A1c; Future; Expected date: 03/02/2020  -     Urinalysis; Future; Expected date: 03/02/2020    Left ear pain; tylenol otc for pain; plain robitussin syrup for congestion otc.  Flonase 1-2 sprays a day for congestion.     Dysfunction of left eustachian tube; as above.     Seasonal allergies; as above; plain robitussin syrup for congestion otc. Flonase 1-2 sprays a day for congestion.

## 2020-03-02 NOTE — PATIENT INSTRUCTIONS
Benign essential hypertension.Maintain < 2 Gm Na a day diet, and monitor BP at home; keep a log.  -     Comprehensive metabolic panel; Future; Expected date: 03/02/2020  -     CBC auto differential; Future; Expected date: 03/02/2020  -     T3, free; Future; Expected date: 03/02/2020  -     T4, free; Future; Expected date: 03/02/2020  -     TSH; Future; Expected date: 03/02/2020  -     Lipid panel; Future; Expected date: 03/02/2020  -     Magnesium; Future; Expected date: 03/02/2020  -     Hemoglobin A1c; Future; Expected date: 03/02/2020  -     Urinalysis; Future; Expected date: 03/02/2020    CKD (chronic kidney disease), stage III; keep well hydrated; NO NSAID agents.   -     Comprehensive metabolic panel; Future; Expected date: 03/02/2020  -     Urinalysis; Future; Expected date: 03/02/2020    Mixed hyperlipidemia.Maintain low fat high fiber diet, exercise regularly. Weight reduction where indicated. Cont atorvastatin.   -     Comprehensive metabolic panel; Future; Expected date: 03/02/2020  -     Lipid panel; Future; Expected date: 03/02/2020    S/P CABG x 3; has been stable; sees Dr Chase as cardiologist.   -     Comprehensive metabolic panel; Future; Expected date: 03/02/2020  -     Lipid panel; Future; Expected date: 03/02/2020  -     Hemoglobin A1c; Future; Expected date: 03/02/2020    S/P coronary artery stent placement; as above; same tx.     Ventricular tachycardia; on amiodarone; pt w some WESTFALL noted. Has been stable; to f/u w Dr Chase mid-March 2020; to bring it up then.   -     Comprehensive metabolic panel; Future; Expected date: 03/02/2020  -     CBC auto differential; Future; Expected date: 03/02/2020  -     T3, free; Future; Expected date: 03/02/2020  -     T4, free; Future; Expected date: 03/02/2020  -     TSH; Future; Expected date: 03/02/2020  -     Magnesium; Future; Expected date: 03/02/2020    Hypothyroidism, unspecified type; same thyroid dosing.   -     T3, free; Future; Expected date:  03/02/2020  -     T4, free; Future; Expected date: 03/02/2020  -     TSH; Future; Expected date: 03/02/2020    Obesity (BMI 30.0-34.9).Caloric restriction w regular exercise and weight reduction.  -     Comprehensive metabolic panel; Future; Expected date: 03/02/2020  -     T3, free; Future; Expected date: 03/02/2020  -     T4, free; Future; Expected date: 03/02/2020  -     TSH; Future; Expected date: 03/02/2020  -     Hemoglobin A1c; Future; Expected date: 03/02/2020    Impaired fasting blood sugar.Exercise recommended with weight reduction and low carb diet; we'll follow hemoglobin A1c's with you periodically.  -     Comprehensive metabolic panel; Future; Expected date: 03/02/2020  -     Hemoglobin A1c; Future; Expected date: 03/02/2020  -     Urinalysis; Future; Expected date: 03/02/2020    Left ear pain; tylenol otc for pain; plain robitussin syrup for congestion otc. Flonase 1-2 sprays a day for congestion.     Dysfunction of left eustachian tube; as above.     Seasonal allergies; as above.

## 2020-03-03 ENCOUNTER — LAB VISIT (OUTPATIENT)
Dept: LAB | Facility: HOSPITAL | Age: 85
End: 2020-03-03
Attending: INTERNAL MEDICINE
Payer: MEDICARE

## 2020-03-03 DIAGNOSIS — Z98.61 POSTSURGICAL PERCUTANEOUS TRANSLUMINAL CORONARY ANGIOPLASTY STATUS: ICD-10-CM

## 2020-03-03 DIAGNOSIS — Z79.899 ENCOUNTER FOR LONG-TERM (CURRENT) USE OF OTHER MEDICATIONS: ICD-10-CM

## 2020-03-03 DIAGNOSIS — K59.00 CN (CONSTIPATION): Primary | ICD-10-CM

## 2020-03-03 DIAGNOSIS — I47.29 PAROXYSMAL VENTRICULAR TACHYCARDIA: ICD-10-CM

## 2020-03-03 DIAGNOSIS — I25.10 CORONARY ATHEROSCLEROSIS OF NATIVE CORONARY ARTERY: ICD-10-CM

## 2020-03-03 DIAGNOSIS — F41.9 ANXIETY HYPERVENTILATION: ICD-10-CM

## 2020-03-03 DIAGNOSIS — F45.8 ANXIETY HYPERVENTILATION: ICD-10-CM

## 2020-03-03 LAB
CORTIS SERPL-MCNC: 14.2 UG/DL
MAGNESIUM SERPL-MCNC: 2 MG/DL (ref 1.6–2.6)

## 2020-03-03 PROCEDURE — 36415 COLL VENOUS BLD VENIPUNCTURE: CPT | Mod: PO

## 2020-03-03 PROCEDURE — 82533 TOTAL CORTISOL: CPT

## 2020-03-03 PROCEDURE — 83735 ASSAY OF MAGNESIUM: CPT

## 2020-03-03 PROCEDURE — 80299 QUANTITATIVE ASSAY DRUG: CPT

## 2020-03-06 LAB
AMIODARONE SERPL-SCNC: 0.6 UG/ML (ref 1–3)
N-DESETHYL-AMIODARONE: 0.4 UG/ML

## 2020-03-18 RX ORDER — LIOTHYRONINE SODIUM 5 UG/1
TABLET ORAL
Qty: 90 TABLET | Refills: 1 | Status: SHIPPED | OUTPATIENT
Start: 2020-03-18 | End: 2020-09-03 | Stop reason: SDUPTHER

## 2020-03-19 DIAGNOSIS — I11.9 BENIGN HYPERTENSIVE HEART DISEASE WITHOUT HEART FAILURE: ICD-10-CM

## 2020-03-19 DIAGNOSIS — Z95.1 S/P CABG X 3: ICD-10-CM

## 2020-03-19 DIAGNOSIS — Z95.5 S/P CORONARY ARTERY STENT PLACEMENT: ICD-10-CM

## 2020-03-20 RX ORDER — METOPROLOL TARTRATE 25 MG/1
TABLET, FILM COATED ORAL
Qty: 90 TABLET | Refills: 3 | Status: SHIPPED | OUTPATIENT
Start: 2020-03-20 | End: 2021-01-23

## 2020-04-02 DIAGNOSIS — N18.30 CKD (CHRONIC KIDNEY DISEASE), STAGE III: ICD-10-CM

## 2020-04-02 DIAGNOSIS — I10 BENIGN ESSENTIAL HYPERTENSION: ICD-10-CM

## 2020-04-02 RX ORDER — TELMISARTAN 20 MG/1
TABLET ORAL
Qty: 90 TABLET | Refills: 1 | Status: SHIPPED | OUTPATIENT
Start: 2020-04-02 | End: 2020-09-03 | Stop reason: SDUPTHER

## 2020-06-16 DIAGNOSIS — I10 BENIGN ESSENTIAL HYPERTENSION: ICD-10-CM

## 2020-06-16 DIAGNOSIS — I95.9 HYPOTENSION, UNSPECIFIED HYPOTENSION TYPE: ICD-10-CM

## 2020-06-18 NOTE — TELEPHONE ENCOUNTER
Please find out from patient the correct dose of his spironolactone is it half of a 25 mg daily or  at a half of a 25 mg tablet every other day?

## 2020-06-18 NOTE — TELEPHONE ENCOUNTER
Spoke to Amy pt's wife in regards to rx refill request for Aldactone 25mg tab, states pt takes 1/2 tab spironolactone(12.5 mg) every other day.

## 2020-06-19 RX ORDER — SPIRONOLACTONE 25 MG/1
12.5 TABLET ORAL EVERY OTHER DAY
Qty: 45 TABLET | Refills: 3 | Status: SHIPPED | OUTPATIENT
Start: 2020-06-19 | End: 2021-08-23

## 2020-06-23 ENCOUNTER — TELEPHONE (OUTPATIENT)
Dept: FAMILY MEDICINE | Facility: CLINIC | Age: 85
End: 2020-06-23

## 2020-06-23 NOTE — TELEPHONE ENCOUNTER
----- Message from Britany St sent at 6/23/2020 12:38 PM CDT -----  Contact: Oralia with [facility]  Type:  Pharmacy Calling to Clarify an RX    Name of Caller:  Oralia   Pharmacy Name:  [facility]  Prescription Name:  spironolactone  What do they need to clarify?:  directions  Best Call Back Number:  [phone]  Additional Information:  Oralia needs clarification on the directions. They have two different instructions. Please call Oralia.  Thanks!

## 2020-07-01 ENCOUNTER — LAB VISIT (OUTPATIENT)
Dept: LAB | Facility: HOSPITAL | Age: 85
End: 2020-07-01
Attending: INTERNAL MEDICINE
Payer: MEDICARE

## 2020-07-01 DIAGNOSIS — N18.30 CKD (CHRONIC KIDNEY DISEASE), STAGE III: ICD-10-CM

## 2020-07-01 LAB
ALBUMIN SERPL BCP-MCNC: 3.8 G/DL (ref 3.5–5.2)
ANION GAP SERPL CALC-SCNC: 10 MMOL/L (ref 8–16)
BUN SERPL-MCNC: 18 MG/DL (ref 8–23)
CALCIUM SERPL-MCNC: 9.3 MG/DL (ref 8.7–10.5)
CHLORIDE SERPL-SCNC: 106 MMOL/L (ref 95–110)
CO2 SERPL-SCNC: 25 MMOL/L (ref 23–29)
CREAT SERPL-MCNC: 1.6 MG/DL (ref 0.5–1.4)
EST. GFR  (AFRICAN AMERICAN): 43.8 ML/MIN/1.73 M^2
EST. GFR  (NON AFRICAN AMERICAN): 37.9 ML/MIN/1.73 M^2
GLUCOSE SERPL-MCNC: 101 MG/DL (ref 70–110)
PHOSPHATE SERPL-MCNC: 3.3 MG/DL (ref 2.7–4.5)
POTASSIUM SERPL-SCNC: 4.4 MMOL/L (ref 3.5–5.1)
PTH-INTACT SERPL-MCNC: 36 PG/ML (ref 9–77)
SODIUM SERPL-SCNC: 141 MMOL/L (ref 136–145)

## 2020-07-01 PROCEDURE — 83970 ASSAY OF PARATHORMONE: CPT

## 2020-07-01 PROCEDURE — 36415 COLL VENOUS BLD VENIPUNCTURE: CPT | Mod: PO

## 2020-07-01 PROCEDURE — 80069 RENAL FUNCTION PANEL: CPT

## 2020-07-17 DIAGNOSIS — K59.00 CONSTIPATION, UNSPECIFIED CONSTIPATION TYPE: ICD-10-CM

## 2020-07-17 DIAGNOSIS — E03.9 HYPOTHYROIDISM, UNSPECIFIED TYPE: ICD-10-CM

## 2020-07-21 RX ORDER — LEVOTHYROXINE SODIUM 125 UG/1
TABLET ORAL
Qty: 45 TABLET | Refills: 1 | Status: SHIPPED | OUTPATIENT
Start: 2020-07-21 | End: 2020-07-23

## 2020-07-22 ENCOUNTER — PATIENT OUTREACH (OUTPATIENT)
Dept: ADMINISTRATIVE | Facility: OTHER | Age: 85
End: 2020-07-22

## 2020-07-22 DIAGNOSIS — E03.9 HYPOTHYROIDISM, UNSPECIFIED TYPE: ICD-10-CM

## 2020-07-22 DIAGNOSIS — K59.00 CONSTIPATION, UNSPECIFIED CONSTIPATION TYPE: ICD-10-CM

## 2020-07-23 ENCOUNTER — OFFICE VISIT (OUTPATIENT)
Dept: NEPHROLOGY | Facility: CLINIC | Age: 85
End: 2020-07-23
Payer: MEDICARE

## 2020-07-23 VITALS
DIASTOLIC BLOOD PRESSURE: 70 MMHG | HEART RATE: 52 BPM | HEIGHT: 61 IN | WEIGHT: 169.44 LBS | BODY MASS INDEX: 31.99 KG/M2 | OXYGEN SATURATION: 96 % | SYSTOLIC BLOOD PRESSURE: 118 MMHG

## 2020-07-23 DIAGNOSIS — E55.9 VITAMIN D DEFICIENCY: ICD-10-CM

## 2020-07-23 DIAGNOSIS — E66.9 OBESITY (BMI 30.0-34.9): ICD-10-CM

## 2020-07-23 DIAGNOSIS — I11.9 BENIGN HYPERTENSIVE HEART DISEASE WITHOUT HEART FAILURE: ICD-10-CM

## 2020-07-23 DIAGNOSIS — N18.30 CKD (CHRONIC KIDNEY DISEASE), STAGE III: Primary | ICD-10-CM

## 2020-07-23 DIAGNOSIS — I25.10 CORONARY ARTERIOSCLEROSIS IN NATIVE ARTERY: ICD-10-CM

## 2020-07-23 DIAGNOSIS — E03.9 HYPOTHYROIDISM, UNSPECIFIED TYPE: ICD-10-CM

## 2020-07-23 DIAGNOSIS — E78.2 MIXED HYPERLIPIDEMIA: ICD-10-CM

## 2020-07-23 DIAGNOSIS — I10 BENIGN ESSENTIAL HYPERTENSION: ICD-10-CM

## 2020-07-23 DIAGNOSIS — R73.9 HYPERGLYCEMIA: ICD-10-CM

## 2020-07-23 DIAGNOSIS — Z95.1 S/P CABG X 3: ICD-10-CM

## 2020-07-23 DIAGNOSIS — N18.30 CKD (CHRONIC KIDNEY DISEASE) STAGE 3, GFR 30-59 ML/MIN: ICD-10-CM

## 2020-07-23 DIAGNOSIS — I47.20 VENTRICULAR TACHYCARDIA: ICD-10-CM

## 2020-07-23 DIAGNOSIS — Z95.5 S/P CORONARY ARTERY STENT PLACEMENT: ICD-10-CM

## 2020-07-23 PROCEDURE — 99214 PR OFFICE/OUTPT VISIT, EST, LEVL IV, 30-39 MIN: ICD-10-PCS | Mod: S$PBB,,, | Performed by: INTERNAL MEDICINE

## 2020-07-23 PROCEDURE — 99214 OFFICE O/P EST MOD 30 MIN: CPT | Mod: PBBFAC,PO | Performed by: INTERNAL MEDICINE

## 2020-07-23 PROCEDURE — 99999 PR PBB SHADOW E&M-EST. PATIENT-LVL IV: CPT | Mod: PBBFAC,,, | Performed by: INTERNAL MEDICINE

## 2020-07-23 PROCEDURE — 99214 OFFICE O/P EST MOD 30 MIN: CPT | Mod: S$PBB,,, | Performed by: INTERNAL MEDICINE

## 2020-07-23 PROCEDURE — 99999 PR PBB SHADOW E&M-EST. PATIENT-LVL IV: ICD-10-PCS | Mod: PBBFAC,,, | Performed by: INTERNAL MEDICINE

## 2020-07-23 RX ORDER — LEVOTHYROXINE SODIUM 125 UG/1
TABLET ORAL
Qty: 45 TABLET | Refills: 1 | Status: SHIPPED | OUTPATIENT
Start: 2020-07-23 | End: 2020-09-03 | Stop reason: SDUPTHER

## 2020-07-23 NOTE — PROGRESS NOTES
Chart reviewed.   Immunizations: Triggered Imm Registry     Orders placed: n/a  Upcoming appts to satisfy TERA topics: n/a

## 2020-08-01 NOTE — PROGRESS NOTES
Subjective:       Patient ID: Shashi Hassan is a 88 y.o. White male who presents for follow-up evaluation of Chronic Kidney Disease    HPI     He is doing well except ongoping constipation  No CP nor SOB. He denies foamy urine, no hematuria and no flank pain. He follows a low sodium diet and feels he stays hydrated. No LE edema and no SOB. No NSAID use and no herbal medications.     Interval history Dec 2019: doing well. Finally has relief from constipation. LE is the same, no increase of SOB    Interval history July 2020: He is doing well. Constipation relieved with 'Aloe vera' product his cardiologist sells in his office. No LE edema. He is painting more due to pandemic. Mentally OK     Review of Systems   Constitutional: Negative for activity change, appetite change, fatigue and unexpected weight change.   HENT: Negative for facial swelling.    Respiratory: Negative for cough and shortness of breath.    Cardiovascular: Negative for chest pain and leg swelling.   Gastrointestinal: Negative for abdominal distention.   Genitourinary: Negative for difficulty urinating, dysuria, frequency, hematuria and urgency.   Musculoskeletal: Negative for arthralgias.   Neurological: Negative for weakness and headaches.   Hematological: Does not bruise/bleed easily.   Psychiatric/Behavioral: Negative for decreased concentration.       Objective:      Physical Exam  Vitals signs and nursing note reviewed.   Constitutional:       General: He is not in acute distress.     Appearance: He is well-developed.   Eyes:      General: No scleral icterus.  Neck:      Vascular: No JVD.   Cardiovascular:      Heart sounds: S1 normal and S2 normal. No friction rub.   Pulmonary:      Breath sounds: No wheezing or rales.   Abdominal:      Palpations: Abdomen is soft.   Skin:     General: Skin is warm and dry.   Neurological:      Mental Status: He is alert and oriented to person, place, and time.         Assessment:       1. CKD (chronic kidney  disease), stage III    2. Benign essential hypertension    3. Vitamin D deficiency    4. S/P CABG x 3    5. Coronary arteriosclerosis in native artery    6. Hypothyroidism, unspecified type    7. Hyperglycemia    8. Benign hypertensive heart disease without heart failure    9. Obesity (BMI 30.0-34.9)    10. Mixed hyperlipidemia    11. S/P coronary artery stent placement    12. CKD (chronic kidney disease) stage 3, GFR 30-59 ml/min    13. Ventricular tachycardia        Plan:             CKD Stage 3 with stable kidney function.  Continue RAAS blockade (sprinolactone) for renal preservation    BP low but he denies frequent orthostasis. Monitor    Mineral and Bone Disease--PTH and D level at goal. Calcium is stable, off calcium months ago. Should not take in setting of CKD      RTC 5 mo

## 2020-08-03 ENCOUNTER — TELEPHONE (OUTPATIENT)
Dept: NEPHROLOGY | Facility: CLINIC | Age: 85
End: 2020-08-03

## 2020-08-03 NOTE — TELEPHONE ENCOUNTER
----- Message from Tony Persaud MD sent at 8/1/2020  6:44 PM CDT -----  RTC 5mo in [place] with labs prior in [place]

## 2020-09-02 DIAGNOSIS — N18.30 CKD (CHRONIC KIDNEY DISEASE), STAGE III: ICD-10-CM

## 2020-09-02 DIAGNOSIS — E03.9 HYPOTHYROIDISM, UNSPECIFIED TYPE: ICD-10-CM

## 2020-09-02 DIAGNOSIS — K59.00 CONSTIPATION, UNSPECIFIED CONSTIPATION TYPE: ICD-10-CM

## 2020-09-02 DIAGNOSIS — I10 BENIGN ESSENTIAL HYPERTENSION: ICD-10-CM

## 2020-09-02 DIAGNOSIS — I95.9 HYPOTENSION, UNSPECIFIED HYPOTENSION TYPE: ICD-10-CM

## 2020-09-02 NOTE — TELEPHONE ENCOUNTER
Rec'd fax refill request from [facility] pharmacy for Spironolactone. Please review and advise.     LOV 3/2/20  Last lab 3/2/20  Last refill 6/19/20 #45 x 3 (sent to local [facility]'s pharmacy not mail order.)

## 2020-09-06 RX ORDER — SPIRONOLACTONE 25 MG/1
12.5 TABLET ORAL EVERY OTHER DAY
Qty: 45 TABLET | Refills: 3 | OUTPATIENT
Start: 2020-09-06

## 2020-09-06 RX ORDER — LIOTHYRONINE SODIUM 5 UG/1
5 TABLET ORAL DAILY
Qty: 90 TABLET | Refills: 3 | Status: SHIPPED | OUTPATIENT
Start: 2020-09-06 | End: 2021-06-16

## 2020-09-06 RX ORDER — LEVOTHYROXINE SODIUM 75 UG/1
TABLET ORAL
Qty: 45 TABLET | Refills: 3 | Status: SHIPPED | OUTPATIENT
Start: 2020-09-06 | End: 2021-07-04

## 2020-09-06 RX ORDER — TELMISARTAN 20 MG/1
20 TABLET ORAL DAILY
Qty: 90 TABLET | Refills: 1 | Status: SHIPPED | OUTPATIENT
Start: 2020-09-06 | End: 2021-02-27

## 2020-09-06 RX ORDER — LEVOTHYROXINE SODIUM 125 UG/1
62.5 TABLET ORAL EVERY OTHER DAY
Qty: 45 TABLET | Refills: 3 | Status: SHIPPED | OUTPATIENT
Start: 2020-09-06 | End: 2021-09-09

## 2020-09-10 DIAGNOSIS — I95.9 HYPOTENSION, UNSPECIFIED HYPOTENSION TYPE: ICD-10-CM

## 2020-09-10 DIAGNOSIS — I10 BENIGN ESSENTIAL HYPERTENSION: ICD-10-CM

## 2020-09-10 NOTE — TELEPHONE ENCOUNTER
Rec'd fax refill request for [facility] mail order for Spironolactone. Please review and advise    LOV 3/2/20  Last lab 2/24/20  Last refill (sent to [facility]'s pharmacy) 6/19/20 #[id number]

## 2020-09-14 NOTE — TELEPHONE ENCOUNTER
Patient's prescription states 2 things; 1st, half of a tablet every other day, then half a tablet a day.  Please clarify w pt so I can send the correct dosage in

## 2020-09-15 RX ORDER — SPIRONOLACTONE 25 MG/1
12.5 TABLET ORAL EVERY OTHER DAY
Qty: 45 TABLET | Refills: 3 | OUTPATIENT
Start: 2020-09-15

## 2020-09-15 NOTE — TELEPHONE ENCOUNTER
Called and spoke with pt's wife who arranges his medications for him. Patient takes half of a tablet every other day. Pt just recently had this medication refill and does not need this refill.  v

## 2021-01-07 ENCOUNTER — IMMUNIZATION (OUTPATIENT)
Dept: FAMILY MEDICINE | Facility: CLINIC | Age: 86
End: 2021-01-07
Payer: MEDICARE

## 2021-01-07 DIAGNOSIS — Z23 NEED FOR VACCINATION: ICD-10-CM

## 2021-01-07 PROCEDURE — 91300 COVID-19, MRNA, LNP-S, PF, 30 MCG/0.3 ML DOSE VACCINE: CPT | Mod: PBBFAC,PO

## 2021-01-20 DIAGNOSIS — Z95.1 S/P CABG X 3: ICD-10-CM

## 2021-01-20 DIAGNOSIS — Z95.5 S/P CORONARY ARTERY STENT PLACEMENT: ICD-10-CM

## 2021-01-20 DIAGNOSIS — I11.9 BENIGN HYPERTENSIVE HEART DISEASE WITHOUT HEART FAILURE: ICD-10-CM

## 2021-01-23 RX ORDER — METOPROLOL TARTRATE 25 MG/1
TABLET, FILM COATED ORAL
Qty: 90 TABLET | Refills: 3 | Status: SHIPPED | OUTPATIENT
Start: 2021-01-23 | End: 2021-11-27

## 2021-01-28 ENCOUNTER — IMMUNIZATION (OUTPATIENT)
Dept: FAMILY MEDICINE | Facility: CLINIC | Age: 86
End: 2021-01-28
Payer: MEDICARE

## 2021-01-28 ENCOUNTER — LAB VISIT (OUTPATIENT)
Dept: LAB | Facility: HOSPITAL | Age: 86
End: 2021-01-28
Attending: INTERNAL MEDICINE
Payer: MEDICARE

## 2021-01-28 DIAGNOSIS — Z23 NEED FOR VACCINATION: Primary | ICD-10-CM

## 2021-01-28 DIAGNOSIS — I10 BENIGN ESSENTIAL HYPERTENSION: ICD-10-CM

## 2021-01-28 DIAGNOSIS — R73.9 HYPERGLYCEMIA: ICD-10-CM

## 2021-01-28 DIAGNOSIS — N18.30 CKD (CHRONIC KIDNEY DISEASE), STAGE III: ICD-10-CM

## 2021-01-28 DIAGNOSIS — E55.9 VITAMIN D DEFICIENCY: ICD-10-CM

## 2021-01-28 LAB
ALBUMIN SERPL BCP-MCNC: 3.6 G/DL (ref 3.5–5.2)
ANION GAP SERPL CALC-SCNC: 6 MMOL/L (ref 8–16)
BASOPHILS # BLD AUTO: 0.02 K/UL (ref 0–0.2)
BASOPHILS NFR BLD: 0.3 % (ref 0–1.9)
BUN SERPL-MCNC: 20 MG/DL (ref 8–23)
CALCIUM SERPL-MCNC: 8.5 MG/DL (ref 8.7–10.5)
CHLORIDE SERPL-SCNC: 104 MMOL/L (ref 95–110)
CO2 SERPL-SCNC: 28 MMOL/L (ref 23–29)
CREAT SERPL-MCNC: 1.4 MG/DL (ref 0.5–1.4)
DIFFERENTIAL METHOD: ABNORMAL
EOSINOPHIL # BLD AUTO: 0.1 K/UL (ref 0–0.5)
EOSINOPHIL NFR BLD: 1.1 % (ref 0–8)
ERYTHROCYTE [DISTWIDTH] IN BLOOD BY AUTOMATED COUNT: 13.1 % (ref 11.5–14.5)
EST. GFR  (AFRICAN AMERICAN): 51.1 ML/MIN/1.73 M^2
EST. GFR  (NON AFRICAN AMERICAN): 44.2 ML/MIN/1.73 M^2
ESTIMATED AVG GLUCOSE: 103 MG/DL (ref 68–131)
GLUCOSE SERPL-MCNC: 91 MG/DL (ref 70–110)
HBA1C MFR BLD: 5.2 % (ref 4–5.6)
HCT VFR BLD AUTO: 39.6 % (ref 40–54)
HGB BLD-MCNC: 13.2 G/DL (ref 14–18)
IMM GRANULOCYTES # BLD AUTO: 0.04 K/UL (ref 0–0.04)
IMM GRANULOCYTES NFR BLD AUTO: 0.6 % (ref 0–0.5)
LYMPHOCYTES # BLD AUTO: 1.6 K/UL (ref 1–4.8)
LYMPHOCYTES NFR BLD: 22.3 % (ref 18–48)
MCH RBC QN AUTO: 30.9 PG (ref 27–31)
MCHC RBC AUTO-ENTMCNC: 33.3 G/DL (ref 32–36)
MCV RBC AUTO: 93 FL (ref 82–98)
MONOCYTES # BLD AUTO: 0.6 K/UL (ref 0.3–1)
MONOCYTES NFR BLD: 7.9 % (ref 4–15)
NEUTROPHILS # BLD AUTO: 4.8 K/UL (ref 1.8–7.7)
NEUTROPHILS NFR BLD: 67.8 % (ref 38–73)
NRBC BLD-RTO: 0 /100 WBC
PHOSPHATE SERPL-MCNC: 3.6 MG/DL (ref 2.7–4.5)
PLATELET # BLD AUTO: 188 K/UL (ref 150–350)
PMV BLD AUTO: 10.9 FL (ref 9.2–12.9)
POTASSIUM SERPL-SCNC: 4.1 MMOL/L (ref 3.5–5.1)
PTH-INTACT SERPL-MCNC: 37 PG/ML (ref 9–77)
RBC # BLD AUTO: 4.27 M/UL (ref 4.6–6.2)
SODIUM SERPL-SCNC: 138 MMOL/L (ref 136–145)
WBC # BLD AUTO: 7.05 K/UL (ref 3.9–12.7)

## 2021-01-28 PROCEDURE — 36415 COLL VENOUS BLD VENIPUNCTURE: CPT | Mod: PO

## 2021-01-28 PROCEDURE — 85025 COMPLETE CBC W/AUTO DIFF WBC: CPT

## 2021-01-28 PROCEDURE — 0002A COVID-19, MRNA, LNP-S, PF, 30 MCG/0.3 ML DOSE VACCINE: CPT | Mod: PBBFAC,PO

## 2021-01-28 PROCEDURE — 80069 RENAL FUNCTION PANEL: CPT

## 2021-01-28 PROCEDURE — 83970 ASSAY OF PARATHORMONE: CPT

## 2021-01-28 PROCEDURE — 91300 COVID-19, MRNA, LNP-S, PF, 30 MCG/0.3 ML DOSE VACCINE: CPT | Mod: PBBFAC,PO

## 2021-01-28 PROCEDURE — 83036 HEMOGLOBIN GLYCOSYLATED A1C: CPT

## 2021-02-01 ENCOUNTER — TELEPHONE (OUTPATIENT)
Dept: NEPHROLOGY | Facility: CLINIC | Age: 86
End: 2021-02-01

## 2021-02-01 DIAGNOSIS — R73.9 HYPERGLYCEMIA: ICD-10-CM

## 2021-02-01 DIAGNOSIS — N18.30 STAGE 3 CHRONIC KIDNEY DISEASE, UNSPECIFIED WHETHER STAGE 3A OR 3B CKD: Primary | ICD-10-CM

## 2021-02-02 ENCOUNTER — LAB VISIT (OUTPATIENT)
Dept: LAB | Facility: HOSPITAL | Age: 86
End: 2021-02-02
Attending: INTERNAL MEDICINE
Payer: MEDICARE

## 2021-02-02 DIAGNOSIS — N18.30 CKD (CHRONIC KIDNEY DISEASE), STAGE III: ICD-10-CM

## 2021-02-02 DIAGNOSIS — E55.9 VITAMIN D DEFICIENCY: ICD-10-CM

## 2021-02-02 DIAGNOSIS — R73.9 HYPERGLYCEMIA: ICD-10-CM

## 2021-02-02 DIAGNOSIS — I10 BENIGN ESSENTIAL HYPERTENSION: ICD-10-CM

## 2021-02-02 LAB
CREAT UR-MCNC: 89 MG/DL (ref 23–375)
PROT UR-MCNC: <7 MG/DL (ref 0–15)
PROT/CREAT UR: NORMAL MG/G{CREAT} (ref 0–0.2)

## 2021-02-02 PROCEDURE — 84156 ASSAY OF PROTEIN URINE: CPT

## 2021-02-08 ENCOUNTER — OFFICE VISIT (OUTPATIENT)
Dept: NEPHROLOGY | Facility: CLINIC | Age: 86
End: 2021-02-08
Payer: MEDICARE

## 2021-02-08 VITALS
WEIGHT: 176 LBS | DIASTOLIC BLOOD PRESSURE: 82 MMHG | OXYGEN SATURATION: 98 % | BODY MASS INDEX: 33.23 KG/M2 | HEART RATE: 41 BPM | TEMPERATURE: 98 F | HEIGHT: 61 IN | SYSTOLIC BLOOD PRESSURE: 128 MMHG

## 2021-02-08 DIAGNOSIS — E66.9 OBESITY (BMI 30.0-34.9): ICD-10-CM

## 2021-02-08 DIAGNOSIS — Z86.010 HX OF COLONIC POLYPS: ICD-10-CM

## 2021-02-08 DIAGNOSIS — I10 BENIGN ESSENTIAL HYPERTENSION: ICD-10-CM

## 2021-02-08 DIAGNOSIS — R73.9 HYPERGLYCEMIA: ICD-10-CM

## 2021-02-08 DIAGNOSIS — Z95.5 S/P CORONARY ARTERY STENT PLACEMENT: ICD-10-CM

## 2021-02-08 DIAGNOSIS — E55.9 VITAMIN D DEFICIENCY: ICD-10-CM

## 2021-02-08 DIAGNOSIS — I25.10 CORONARY ARTERIOSCLEROSIS IN NATIVE ARTERY: ICD-10-CM

## 2021-02-08 DIAGNOSIS — E78.2 MIXED HYPERLIPIDEMIA: ICD-10-CM

## 2021-02-08 DIAGNOSIS — I11.9 BENIGN HYPERTENSIVE HEART DISEASE WITHOUT HEART FAILURE: ICD-10-CM

## 2021-02-08 DIAGNOSIS — Z95.1 S/P CABG X 3: ICD-10-CM

## 2021-02-08 DIAGNOSIS — N18.32 STAGE 3B CHRONIC KIDNEY DISEASE: Primary | ICD-10-CM

## 2021-02-08 DIAGNOSIS — E03.9 HYPOTHYROIDISM, UNSPECIFIED TYPE: ICD-10-CM

## 2021-02-08 PROCEDURE — 99215 PR OFFICE/OUTPT VISIT, EST, LEVL V, 40-54 MIN: ICD-10-PCS | Mod: S$PBB,,, | Performed by: INTERNAL MEDICINE

## 2021-02-08 PROCEDURE — 99214 OFFICE O/P EST MOD 30 MIN: CPT | Mod: PBBFAC,PO | Performed by: INTERNAL MEDICINE

## 2021-02-08 PROCEDURE — 99215 OFFICE O/P EST HI 40 MIN: CPT | Mod: S$PBB,,, | Performed by: INTERNAL MEDICINE

## 2021-02-08 PROCEDURE — 99999 PR PBB SHADOW E&M-EST. PATIENT-LVL IV: CPT | Mod: PBBFAC,,, | Performed by: INTERNAL MEDICINE

## 2021-02-08 PROCEDURE — 99999 PR PBB SHADOW E&M-EST. PATIENT-LVL IV: ICD-10-PCS | Mod: PBBFAC,,, | Performed by: INTERNAL MEDICINE

## 2021-02-08 RX ORDER — INFLUENZA A VIRUS A/MICHIGAN/45/2015 X-275 (H1N1) ANTIGEN (FORMALDEHYDE INACTIVATED), INFLUENZA A VIRUS A/SINGAPORE/INFIMH-16-0019/2016 IVR-186 (H3N2) ANTIGEN (FORMALDEHYDE INACTIVATED), INFLUENZA B VIRUS B/PHUKET/3073/2013 ANTIGEN (FORMALDEHYDE INACTIVATED), AND INFLUENZA B VIRUS B/MARYLAND/15/2016 BX-69A ANTIGEN (FORMALDEHYDE INACTIVATED) 60; 60; 60; 60 UG/.7ML; UG/.7ML; UG/.7ML; UG/.7ML
INJECTION, SUSPENSION INTRAMUSCULAR
COMMUNITY
Start: 2020-11-03 | End: 2021-07-10

## 2021-02-25 DIAGNOSIS — N18.30 CKD (CHRONIC KIDNEY DISEASE), STAGE III: ICD-10-CM

## 2021-02-25 DIAGNOSIS — I10 BENIGN ESSENTIAL HYPERTENSION: ICD-10-CM

## 2021-02-27 ENCOUNTER — TELEPHONE (OUTPATIENT)
Dept: FAMILY MEDICINE | Facility: CLINIC | Age: 86
End: 2021-02-27

## 2021-02-27 RX ORDER — TELMISARTAN 20 MG/1
TABLET ORAL
Qty: 90 TABLET | Refills: 1 | Status: SHIPPED | OUTPATIENT
Start: 2021-02-27 | End: 2021-07-09

## 2021-03-25 ENCOUNTER — OFFICE VISIT (OUTPATIENT)
Dept: FAMILY MEDICINE | Facility: CLINIC | Age: 86
End: 2021-03-25
Payer: MEDICARE

## 2021-03-25 VITALS
HEIGHT: 61 IN | HEART RATE: 52 BPM | DIASTOLIC BLOOD PRESSURE: 62 MMHG | OXYGEN SATURATION: 97 % | WEIGHT: 170.75 LBS | SYSTOLIC BLOOD PRESSURE: 128 MMHG | BODY MASS INDEX: 32.24 KG/M2

## 2021-03-25 DIAGNOSIS — E03.9 HYPOTHYROIDISM, UNSPECIFIED TYPE: ICD-10-CM

## 2021-03-25 DIAGNOSIS — E66.9 OBESITY (BMI 30.0-34.9): ICD-10-CM

## 2021-03-25 DIAGNOSIS — E78.2 MIXED HYPERLIPIDEMIA: ICD-10-CM

## 2021-03-25 DIAGNOSIS — E78.5 DYSLIPIDEMIA: ICD-10-CM

## 2021-03-25 DIAGNOSIS — N18.32 STAGE 3B CHRONIC KIDNEY DISEASE: ICD-10-CM

## 2021-03-25 DIAGNOSIS — Z95.5 S/P CORONARY ARTERY STENT PLACEMENT: ICD-10-CM

## 2021-03-25 DIAGNOSIS — I10 BENIGN ESSENTIAL HYPERTENSION: Primary | ICD-10-CM

## 2021-03-25 DIAGNOSIS — Z95.1 S/P CABG X 3: ICD-10-CM

## 2021-03-25 PROCEDURE — 99213 OFFICE O/P EST LOW 20 MIN: CPT | Mod: PBBFAC,PN | Performed by: INTERNAL MEDICINE

## 2021-03-25 PROCEDURE — 99214 PR OFFICE/OUTPT VISIT, EST, LEVL IV, 30-39 MIN: ICD-10-PCS | Mod: S$PBB,,, | Performed by: INTERNAL MEDICINE

## 2021-03-25 PROCEDURE — 99999 PR PBB SHADOW E&M-EST. PATIENT-LVL III: ICD-10-PCS | Mod: PBBFAC,,, | Performed by: INTERNAL MEDICINE

## 2021-03-25 PROCEDURE — 99999 PR PBB SHADOW E&M-EST. PATIENT-LVL III: CPT | Mod: PBBFAC,,, | Performed by: INTERNAL MEDICINE

## 2021-03-25 PROCEDURE — 99214 OFFICE O/P EST MOD 30 MIN: CPT | Mod: S$PBB,,, | Performed by: INTERNAL MEDICINE

## 2021-03-25 RX ORDER — ATORVASTATIN CALCIUM 40 MG/1
40 TABLET, FILM COATED ORAL DAILY
Qty: 90 TABLET | Refills: 3 | Status: SHIPPED | OUTPATIENT
Start: 2021-03-25 | End: 2022-04-13

## 2021-05-26 ENCOUNTER — PES CALL (OUTPATIENT)
Dept: ADMINISTRATIVE | Facility: CLINIC | Age: 86
End: 2021-05-26

## 2021-06-02 ENCOUNTER — OFFICE VISIT (OUTPATIENT)
Dept: FAMILY MEDICINE | Facility: CLINIC | Age: 86
End: 2021-06-02
Payer: MEDICARE

## 2021-06-02 VITALS
DIASTOLIC BLOOD PRESSURE: 62 MMHG | OXYGEN SATURATION: 96 % | WEIGHT: 168.63 LBS | HEIGHT: 61 IN | HEART RATE: 55 BPM | SYSTOLIC BLOOD PRESSURE: 120 MMHG | BODY MASS INDEX: 31.84 KG/M2

## 2021-06-02 DIAGNOSIS — N18.32 STAGE 3B CHRONIC KIDNEY DISEASE: ICD-10-CM

## 2021-06-02 DIAGNOSIS — Z00.00 ENCOUNTER FOR PREVENTIVE HEALTH EXAMINATION: Primary | ICD-10-CM

## 2021-06-02 DIAGNOSIS — I47.20 VENTRICULAR TACHYCARDIA: ICD-10-CM

## 2021-06-02 DIAGNOSIS — I25.10 CORONARY ARTERIOSCLEROSIS IN NATIVE ARTERY: ICD-10-CM

## 2021-06-02 PROCEDURE — 99215 OFFICE O/P EST HI 40 MIN: CPT | Mod: PBBFAC,PO | Performed by: NURSE PRACTITIONER

## 2021-06-02 PROCEDURE — G0439 PPPS, SUBSEQ VISIT: HCPCS | Mod: ,,, | Performed by: NURSE PRACTITIONER

## 2021-06-02 PROCEDURE — G0439 PR MEDICARE ANNUAL WELLNESS SUBSEQUENT VISIT: ICD-10-PCS | Mod: ,,, | Performed by: NURSE PRACTITIONER

## 2021-06-02 PROCEDURE — 99999 PR PBB SHADOW E&M-EST. PATIENT-LVL V: CPT | Mod: PBBFAC,,, | Performed by: NURSE PRACTITIONER

## 2021-06-02 PROCEDURE — 99999 PR PBB SHADOW E&M-EST. PATIENT-LVL V: ICD-10-PCS | Mod: PBBFAC,,, | Performed by: NURSE PRACTITIONER

## 2021-06-02 RX ORDER — EPINEPHRINE 0.22MG
100 AEROSOL WITH ADAPTER (ML) INHALATION 2 TIMES DAILY
COMMUNITY

## 2021-06-16 RX ORDER — LIOTHYRONINE SODIUM 5 UG/1
TABLET ORAL
Qty: 90 TABLET | Refills: 3 | Status: SHIPPED | OUTPATIENT
Start: 2021-06-16 | End: 2022-03-02 | Stop reason: SDUPTHER

## 2021-06-18 ENCOUNTER — LAB VISIT (OUTPATIENT)
Dept: LAB | Facility: HOSPITAL | Age: 86
End: 2021-06-18
Attending: INTERNAL MEDICINE
Payer: MEDICARE

## 2021-06-18 DIAGNOSIS — E03.9 HYPOTHYROIDISM, UNSPECIFIED TYPE: ICD-10-CM

## 2021-06-18 DIAGNOSIS — E78.5 DYSLIPIDEMIA: ICD-10-CM

## 2021-06-18 DIAGNOSIS — N18.32 STAGE 3B CHRONIC KIDNEY DISEASE: ICD-10-CM

## 2021-06-18 DIAGNOSIS — I10 BENIGN ESSENTIAL HYPERTENSION: ICD-10-CM

## 2021-06-18 DIAGNOSIS — E66.9 OBESITY (BMI 30.0-34.9): ICD-10-CM

## 2021-06-18 DIAGNOSIS — E78.2 MIXED HYPERLIPIDEMIA: ICD-10-CM

## 2021-06-18 LAB
ALBUMIN SERPL BCP-MCNC: 3.7 G/DL (ref 3.5–5.2)
ALP SERPL-CCNC: 53 U/L (ref 55–135)
ALT SERPL W/O P-5'-P-CCNC: 21 U/L (ref 10–44)
ANION GAP SERPL CALC-SCNC: 12 MMOL/L (ref 8–16)
AST SERPL-CCNC: 26 U/L (ref 10–40)
BASOPHILS # BLD AUTO: 0.03 K/UL (ref 0–0.2)
BASOPHILS NFR BLD: 0.5 % (ref 0–1.9)
BILIRUB SERPL-MCNC: 0.6 MG/DL (ref 0.1–1)
BUN SERPL-MCNC: 22 MG/DL (ref 8–23)
CALCIUM SERPL-MCNC: 9 MG/DL (ref 8.7–10.5)
CHLORIDE SERPL-SCNC: 105 MMOL/L (ref 95–110)
CHOLEST SERPL-MCNC: 106 MG/DL (ref 120–199)
CHOLEST/HDLC SERPL: 3 {RATIO} (ref 2–5)
CO2 SERPL-SCNC: 23 MMOL/L (ref 23–29)
CREAT SERPL-MCNC: 1.6 MG/DL (ref 0.5–1.4)
DIFFERENTIAL METHOD: ABNORMAL
EOSINOPHIL # BLD AUTO: 0.1 K/UL (ref 0–0.5)
EOSINOPHIL NFR BLD: 1.6 % (ref 0–8)
ERYTHROCYTE [DISTWIDTH] IN BLOOD BY AUTOMATED COUNT: 12.9 % (ref 11.5–14.5)
EST. GFR  (AFRICAN AMERICAN): 43.5 ML/MIN/1.73 M^2
EST. GFR  (NON AFRICAN AMERICAN): 37.6 ML/MIN/1.73 M^2
GLUCOSE SERPL-MCNC: 98 MG/DL (ref 70–110)
HCT VFR BLD AUTO: 39.8 % (ref 40–54)
HDLC SERPL-MCNC: 35 MG/DL (ref 40–75)
HDLC SERPL: 33 % (ref 20–50)
HGB BLD-MCNC: 13.5 G/DL (ref 14–18)
IMM GRANULOCYTES # BLD AUTO: 0.02 K/UL (ref 0–0.04)
IMM GRANULOCYTES NFR BLD AUTO: 0.4 % (ref 0–0.5)
LDLC SERPL CALC-MCNC: 45 MG/DL (ref 63–159)
LYMPHOCYTES # BLD AUTO: 1.7 K/UL (ref 1–4.8)
LYMPHOCYTES NFR BLD: 30.7 % (ref 18–48)
MAGNESIUM SERPL-MCNC: 1.9 MG/DL (ref 1.6–2.6)
MCH RBC QN AUTO: 31.5 PG (ref 27–31)
MCHC RBC AUTO-ENTMCNC: 33.9 G/DL (ref 32–36)
MCV RBC AUTO: 93 FL (ref 82–98)
MONOCYTES # BLD AUTO: 0.4 K/UL (ref 0.3–1)
MONOCYTES NFR BLD: 7.6 % (ref 4–15)
NEUTROPHILS # BLD AUTO: 3.4 K/UL (ref 1.8–7.7)
NEUTROPHILS NFR BLD: 59.2 % (ref 38–73)
NONHDLC SERPL-MCNC: 71 MG/DL
NRBC BLD-RTO: 0 /100 WBC
PLATELET # BLD AUTO: 200 K/UL (ref 150–450)
PMV BLD AUTO: 10.4 FL (ref 9.2–12.9)
POTASSIUM SERPL-SCNC: 5 MMOL/L (ref 3.5–5.1)
PROT SERPL-MCNC: 6.8 G/DL (ref 6–8.4)
RBC # BLD AUTO: 4.29 M/UL (ref 4.6–6.2)
SODIUM SERPL-SCNC: 140 MMOL/L (ref 136–145)
T4 FREE SERPL-MCNC: 1.02 NG/DL (ref 0.71–1.51)
TRIGL SERPL-MCNC: 130 MG/DL (ref 30–150)
TSH SERPL DL<=0.005 MIU/L-ACNC: 2.69 UIU/ML (ref 0.4–4)
WBC # BLD AUTO: 5.66 K/UL (ref 3.9–12.7)

## 2021-06-18 PROCEDURE — 85025 COMPLETE CBC W/AUTO DIFF WBC: CPT | Performed by: INTERNAL MEDICINE

## 2021-06-18 PROCEDURE — 84443 ASSAY THYROID STIM HORMONE: CPT | Performed by: INTERNAL MEDICINE

## 2021-06-18 PROCEDURE — 36415 COLL VENOUS BLD VENIPUNCTURE: CPT | Mod: PO | Performed by: INTERNAL MEDICINE

## 2021-06-18 PROCEDURE — 84439 ASSAY OF FREE THYROXINE: CPT | Performed by: INTERNAL MEDICINE

## 2021-06-18 PROCEDURE — 84481 FREE ASSAY (FT-3): CPT | Performed by: INTERNAL MEDICINE

## 2021-06-18 PROCEDURE — 80053 COMPREHEN METABOLIC PANEL: CPT | Performed by: INTERNAL MEDICINE

## 2021-06-18 PROCEDURE — 80061 LIPID PANEL: CPT | Performed by: INTERNAL MEDICINE

## 2021-06-18 PROCEDURE — 83735 ASSAY OF MAGNESIUM: CPT | Performed by: INTERNAL MEDICINE

## 2021-06-21 LAB — T3FREE SERPL-MCNC: 2.2 PG/ML (ref 2.3–4.2)

## 2021-06-25 ENCOUNTER — OFFICE VISIT (OUTPATIENT)
Dept: FAMILY MEDICINE | Facility: CLINIC | Age: 86
End: 2021-06-25
Payer: MEDICARE

## 2021-06-25 VITALS
TEMPERATURE: 98 F | WEIGHT: 165.13 LBS | HEART RATE: 52 BPM | DIASTOLIC BLOOD PRESSURE: 50 MMHG | HEIGHT: 61 IN | SYSTOLIC BLOOD PRESSURE: 102 MMHG | BODY MASS INDEX: 31.18 KG/M2

## 2021-06-25 DIAGNOSIS — E78.2 MIXED HYPERLIPIDEMIA: Primary | ICD-10-CM

## 2021-06-25 DIAGNOSIS — E03.9 HYPOTHYROIDISM, UNSPECIFIED TYPE: ICD-10-CM

## 2021-06-25 DIAGNOSIS — N18.32 STAGE 3B CHRONIC KIDNEY DISEASE: ICD-10-CM

## 2021-06-25 DIAGNOSIS — I11.9 HYPERTENSIVE HEART DISEASE WITHOUT HEART FAILURE: ICD-10-CM

## 2021-06-25 DIAGNOSIS — Z95.5 S/P CORONARY ARTERY STENT PLACEMENT: ICD-10-CM

## 2021-06-25 DIAGNOSIS — I47.20 VENTRICULAR TACHYCARDIA: ICD-10-CM

## 2021-06-25 DIAGNOSIS — E66.9 OBESITY (BMI 30.0-34.9): ICD-10-CM

## 2021-06-25 DIAGNOSIS — Z01.89 ENCOUNTER FOR LABORATORY TEST: ICD-10-CM

## 2021-06-25 DIAGNOSIS — Z95.1 S/P CABG X 3: ICD-10-CM

## 2021-06-25 PROCEDURE — 99214 OFFICE O/P EST MOD 30 MIN: CPT | Mod: S$PBB,,, | Performed by: INTERNAL MEDICINE

## 2021-06-25 PROCEDURE — 99999 PR PBB SHADOW E&M-EST. PATIENT-LVL V: CPT | Mod: PBBFAC,,, | Performed by: INTERNAL MEDICINE

## 2021-06-25 PROCEDURE — 99214 PR OFFICE/OUTPT VISIT, EST, LEVL IV, 30-39 MIN: ICD-10-PCS | Mod: S$PBB,,, | Performed by: INTERNAL MEDICINE

## 2021-06-25 PROCEDURE — 99999 PR PBB SHADOW E&M-EST. PATIENT-LVL V: ICD-10-PCS | Mod: PBBFAC,,, | Performed by: INTERNAL MEDICINE

## 2021-06-25 PROCEDURE — 99215 OFFICE O/P EST HI 40 MIN: CPT | Mod: PBBFAC,PN | Performed by: INTERNAL MEDICINE

## 2021-06-30 DIAGNOSIS — K59.00 CONSTIPATION, UNSPECIFIED CONSTIPATION TYPE: ICD-10-CM

## 2021-06-30 DIAGNOSIS — E03.9 HYPOTHYROIDISM, UNSPECIFIED TYPE: ICD-10-CM

## 2021-07-04 RX ORDER — LEVOTHYROXINE SODIUM 75 UG/1
TABLET ORAL
Qty: 45 TABLET | Refills: 3 | Status: SHIPPED | OUTPATIENT
Start: 2021-07-04 | End: 2022-08-11

## 2021-07-07 DIAGNOSIS — N18.30 CKD (CHRONIC KIDNEY DISEASE), STAGE III: ICD-10-CM

## 2021-07-07 DIAGNOSIS — I10 BENIGN ESSENTIAL HYPERTENSION: ICD-10-CM

## 2021-07-09 ENCOUNTER — TELEPHONE (OUTPATIENT)
Dept: NEPHROLOGY | Facility: CLINIC | Age: 86
End: 2021-07-09

## 2021-07-09 RX ORDER — TELMISARTAN 20 MG/1
TABLET ORAL
Qty: 90 TABLET | Refills: 1 | Status: SHIPPED | OUTPATIENT
Start: 2021-07-09 | End: 2022-03-16 | Stop reason: SDUPTHER

## 2021-09-23 ENCOUNTER — LAB VISIT (OUTPATIENT)
Dept: LAB | Facility: HOSPITAL | Age: 86
End: 2021-09-23
Attending: INTERNAL MEDICINE
Payer: MEDICARE

## 2021-09-23 DIAGNOSIS — N18.32 STAGE 3B CHRONIC KIDNEY DISEASE: ICD-10-CM

## 2021-09-23 DIAGNOSIS — I10 BENIGN ESSENTIAL HYPERTENSION: ICD-10-CM

## 2021-09-23 LAB
ALBUMIN SERPL BCP-MCNC: 3.6 G/DL (ref 3.5–5.2)
ANION GAP SERPL CALC-SCNC: 8 MMOL/L (ref 8–16)
BASOPHILS # BLD AUTO: 0.02 K/UL (ref 0–0.2)
BASOPHILS NFR BLD: 0.3 % (ref 0–1.9)
BUN SERPL-MCNC: 22 MG/DL (ref 8–23)
CALCIUM SERPL-MCNC: 9.2 MG/DL (ref 8.7–10.5)
CHLORIDE SERPL-SCNC: 107 MMOL/L (ref 95–110)
CO2 SERPL-SCNC: 27 MMOL/L (ref 23–29)
CREAT SERPL-MCNC: 1.6 MG/DL (ref 0.5–1.4)
DIFFERENTIAL METHOD: ABNORMAL
EOSINOPHIL # BLD AUTO: 0.1 K/UL (ref 0–0.5)
EOSINOPHIL NFR BLD: 1 % (ref 0–8)
ERYTHROCYTE [DISTWIDTH] IN BLOOD BY AUTOMATED COUNT: 12.9 % (ref 11.5–14.5)
EST. GFR  (AFRICAN AMERICAN): 43.5 ML/MIN/1.73 M^2
EST. GFR  (NON AFRICAN AMERICAN): 37.6 ML/MIN/1.73 M^2
GLUCOSE SERPL-MCNC: 84 MG/DL (ref 70–110)
HCT VFR BLD AUTO: 38 % (ref 40–54)
HGB BLD-MCNC: 12.7 G/DL (ref 14–18)
IMM GRANULOCYTES # BLD AUTO: 0.04 K/UL (ref 0–0.04)
IMM GRANULOCYTES NFR BLD AUTO: 0.6 % (ref 0–0.5)
LYMPHOCYTES # BLD AUTO: 1.7 K/UL (ref 1–4.8)
LYMPHOCYTES NFR BLD: 23 % (ref 18–48)
MCH RBC QN AUTO: 31.5 PG (ref 27–31)
MCHC RBC AUTO-ENTMCNC: 33.4 G/DL (ref 32–36)
MCV RBC AUTO: 94 FL (ref 82–98)
MONOCYTES # BLD AUTO: 0.6 K/UL (ref 0.3–1)
MONOCYTES NFR BLD: 8.5 % (ref 4–15)
NEUTROPHILS # BLD AUTO: 4.8 K/UL (ref 1.8–7.7)
NEUTROPHILS NFR BLD: 66.6 % (ref 38–73)
NRBC BLD-RTO: 0 /100 WBC
PHOSPHATE SERPL-MCNC: 3.3 MG/DL (ref 2.7–4.5)
PLATELET # BLD AUTO: 183 K/UL (ref 150–450)
PMV BLD AUTO: 10.6 FL (ref 9.2–12.9)
POTASSIUM SERPL-SCNC: 4.5 MMOL/L (ref 3.5–5.1)
PTH-INTACT SERPL-MCNC: 39.4 PG/ML (ref 9–77)
RBC # BLD AUTO: 4.03 M/UL (ref 4.6–6.2)
SODIUM SERPL-SCNC: 142 MMOL/L (ref 136–145)
WBC # BLD AUTO: 7.21 K/UL (ref 3.9–12.7)

## 2021-09-23 PROCEDURE — 83970 ASSAY OF PARATHORMONE: CPT | Performed by: INTERNAL MEDICINE

## 2021-09-23 PROCEDURE — 36415 COLL VENOUS BLD VENIPUNCTURE: CPT | Mod: PO | Performed by: INTERNAL MEDICINE

## 2021-09-23 PROCEDURE — 85025 COMPLETE CBC W/AUTO DIFF WBC: CPT | Performed by: INTERNAL MEDICINE

## 2021-09-23 PROCEDURE — 80069 RENAL FUNCTION PANEL: CPT | Performed by: INTERNAL MEDICINE

## 2021-09-30 ENCOUNTER — OFFICE VISIT (OUTPATIENT)
Dept: NEPHROLOGY | Facility: CLINIC | Age: 86
End: 2021-09-30
Payer: MEDICARE

## 2021-09-30 VITALS
DIASTOLIC BLOOD PRESSURE: 80 MMHG | HEIGHT: 61 IN | WEIGHT: 165.81 LBS | SYSTOLIC BLOOD PRESSURE: 132 MMHG | BODY MASS INDEX: 31.3 KG/M2 | HEART RATE: 46 BPM | OXYGEN SATURATION: 98 %

## 2021-09-30 DIAGNOSIS — I11.9 BENIGN HYPERTENSIVE HEART DISEASE WITHOUT HEART FAILURE: ICD-10-CM

## 2021-09-30 DIAGNOSIS — E03.9 HYPOTHYROIDISM, UNSPECIFIED TYPE: ICD-10-CM

## 2021-09-30 DIAGNOSIS — E78.2 MIXED HYPERLIPIDEMIA: ICD-10-CM

## 2021-09-30 DIAGNOSIS — E66.9 OBESITY (BMI 30.0-34.9): ICD-10-CM

## 2021-09-30 DIAGNOSIS — I47.20 VENTRICULAR TACHYCARDIA: ICD-10-CM

## 2021-09-30 DIAGNOSIS — N18.32 STAGE 3B CHRONIC KIDNEY DISEASE: Primary | ICD-10-CM

## 2021-09-30 DIAGNOSIS — Z95.1 S/P CABG X 3: ICD-10-CM

## 2021-09-30 DIAGNOSIS — E55.9 VITAMIN D DEFICIENCY: ICD-10-CM

## 2021-09-30 DIAGNOSIS — I25.10 CORONARY ARTERIOSCLEROSIS IN NATIVE ARTERY: ICD-10-CM

## 2021-09-30 DIAGNOSIS — I10 BENIGN ESSENTIAL HYPERTENSION: ICD-10-CM

## 2021-09-30 DIAGNOSIS — Z95.5 S/P CORONARY ARTERY STENT PLACEMENT: ICD-10-CM

## 2021-09-30 PROCEDURE — 99999 PR PBB SHADOW E&M-EST. PATIENT-LVL III: ICD-10-PCS | Mod: PBBFAC,,, | Performed by: INTERNAL MEDICINE

## 2021-09-30 PROCEDURE — 99213 OFFICE O/P EST LOW 20 MIN: CPT | Mod: PBBFAC,PO | Performed by: INTERNAL MEDICINE

## 2021-09-30 PROCEDURE — 99215 PR OFFICE/OUTPT VISIT, EST, LEVL V, 40-54 MIN: ICD-10-PCS | Mod: S$PBB,,, | Performed by: INTERNAL MEDICINE

## 2021-09-30 PROCEDURE — 99999 PR PBB SHADOW E&M-EST. PATIENT-LVL III: CPT | Mod: PBBFAC,,, | Performed by: INTERNAL MEDICINE

## 2021-09-30 PROCEDURE — 99215 OFFICE O/P EST HI 40 MIN: CPT | Mod: S$PBB,,, | Performed by: INTERNAL MEDICINE

## 2021-10-12 ENCOUNTER — IMMUNIZATION (OUTPATIENT)
Dept: FAMILY MEDICINE | Facility: CLINIC | Age: 86
End: 2021-10-12
Payer: MEDICARE

## 2021-10-12 DIAGNOSIS — Z23 NEED FOR VACCINATION: Primary | ICD-10-CM

## 2021-10-12 PROCEDURE — G0008 ADMIN INFLUENZA VIRUS VAC: HCPCS | Mod: PBBFAC,PO

## 2021-10-12 PROCEDURE — 91300 COVID-19, MRNA, LNP-S, PF, 30 MCG/0.3 ML DOSE VACCINE: CPT | Mod: PBBFAC,PO

## 2021-10-12 PROCEDURE — 0003A COVID-19, MRNA, LNP-S, PF, 30 MCG/0.3 ML DOSE VACCINE: CPT | Mod: PBBFAC | Performed by: FAMILY MEDICINE

## 2021-10-12 PROCEDURE — 90694 VACC AIIV4 NO PRSRV 0.5ML IM: CPT | Mod: PBBFAC,PO

## 2021-11-26 DIAGNOSIS — Z95.1 S/P CABG X 3: ICD-10-CM

## 2021-11-26 DIAGNOSIS — Z95.5 S/P CORONARY ARTERY STENT PLACEMENT: ICD-10-CM

## 2021-11-26 DIAGNOSIS — I11.9 BENIGN HYPERTENSIVE HEART DISEASE WITHOUT HEART FAILURE: ICD-10-CM

## 2021-11-27 RX ORDER — METOPROLOL TARTRATE 25 MG/1
TABLET, FILM COATED ORAL
Qty: 90 TABLET | Refills: 3 | Status: SHIPPED | OUTPATIENT
Start: 2021-11-27 | End: 2022-10-27

## 2021-12-01 ENCOUNTER — OFFICE VISIT (OUTPATIENT)
Dept: FAMILY MEDICINE | Facility: CLINIC | Age: 86
End: 2021-12-01
Payer: MEDICARE

## 2021-12-01 ENCOUNTER — LAB VISIT (OUTPATIENT)
Dept: LAB | Facility: HOSPITAL | Age: 86
End: 2021-12-01
Attending: INTERNAL MEDICINE
Payer: MEDICARE

## 2021-12-01 VITALS
DIASTOLIC BLOOD PRESSURE: 64 MMHG | WEIGHT: 164 LBS | HEIGHT: 61 IN | SYSTOLIC BLOOD PRESSURE: 138 MMHG | BODY MASS INDEX: 30.96 KG/M2 | HEART RATE: 45 BPM | OXYGEN SATURATION: 96 % | RESPIRATION RATE: 18 BRPM

## 2021-12-01 DIAGNOSIS — R00.1 SINUS BRADYCARDIA: ICD-10-CM

## 2021-12-01 DIAGNOSIS — Z95.5 STATUS POST CORONARY ARTERY STENT PLACEMENT: ICD-10-CM

## 2021-12-01 DIAGNOSIS — R06.09 DYSPNEA ON EXERTION: ICD-10-CM

## 2021-12-01 DIAGNOSIS — E03.9 HYPOTHYROIDISM, UNSPECIFIED TYPE: ICD-10-CM

## 2021-12-01 DIAGNOSIS — R94.31 ABNORMAL EKG: ICD-10-CM

## 2021-12-01 DIAGNOSIS — E66.9 OBESITY (BMI 30.0-34.9): ICD-10-CM

## 2021-12-01 DIAGNOSIS — I11.9 HYPERTENSIVE HEART DISEASE WITHOUT HEART FAILURE: ICD-10-CM

## 2021-12-01 DIAGNOSIS — Z01.89 ENCOUNTER FOR LABORATORY TEST: ICD-10-CM

## 2021-12-01 DIAGNOSIS — Z95.1 S/P CABG X 3: ICD-10-CM

## 2021-12-01 DIAGNOSIS — I47.20 VENTRICULAR TACHYCARDIA: ICD-10-CM

## 2021-12-01 DIAGNOSIS — N18.32 STAGE 3B CHRONIC KIDNEY DISEASE: ICD-10-CM

## 2021-12-01 DIAGNOSIS — E78.2 MIXED HYPERLIPIDEMIA: ICD-10-CM

## 2021-12-01 DIAGNOSIS — Z86.79 HISTORY OF VENTRICULAR TACHYCARDIA: ICD-10-CM

## 2021-12-01 DIAGNOSIS — I11.9 BENIGN HYPERTENSIVE HEART DISEASE WITHOUT HEART FAILURE: Primary | ICD-10-CM

## 2021-12-01 DIAGNOSIS — R00.1 BRADYCARDIA: ICD-10-CM

## 2021-12-01 LAB
ALBUMIN SERPL BCP-MCNC: 3.6 G/DL (ref 3.5–5.2)
ALP SERPL-CCNC: 61 U/L (ref 55–135)
ALT SERPL W/O P-5'-P-CCNC: 22 U/L (ref 10–44)
ANION GAP SERPL CALC-SCNC: 9 MMOL/L (ref 8–16)
AST SERPL-CCNC: 19 U/L (ref 10–40)
BASOPHILS # BLD AUTO: 0.02 K/UL (ref 0–0.2)
BASOPHILS NFR BLD: 0.3 % (ref 0–1.9)
BILIRUB SERPL-MCNC: 0.6 MG/DL (ref 0.1–1)
BUN SERPL-MCNC: 22 MG/DL (ref 8–23)
CALCIUM SERPL-MCNC: 9.5 MG/DL (ref 8.7–10.5)
CHLORIDE SERPL-SCNC: 106 MMOL/L (ref 95–110)
CHOLEST SERPL-MCNC: 123 MG/DL (ref 120–199)
CHOLEST/HDLC SERPL: 2.9 {RATIO} (ref 2–5)
CO2 SERPL-SCNC: 27 MMOL/L (ref 23–29)
CREAT SERPL-MCNC: 1.5 MG/DL (ref 0.5–1.4)
DIFFERENTIAL METHOD: ABNORMAL
EOSINOPHIL # BLD AUTO: 0.1 K/UL (ref 0–0.5)
EOSINOPHIL NFR BLD: 1.8 % (ref 0–8)
ERYTHROCYTE [DISTWIDTH] IN BLOOD BY AUTOMATED COUNT: 12.6 % (ref 11.5–14.5)
EST. GFR  (AFRICAN AMERICAN): 46.7 ML/MIN/1.73 M^2
EST. GFR  (NON AFRICAN AMERICAN): 40.4 ML/MIN/1.73 M^2
GLUCOSE SERPL-MCNC: 96 MG/DL (ref 70–110)
HCT VFR BLD AUTO: 42.4 % (ref 40–54)
HDLC SERPL-MCNC: 43 MG/DL (ref 40–75)
HDLC SERPL: 35 % (ref 20–50)
HGB BLD-MCNC: 13.8 G/DL (ref 14–18)
IMM GRANULOCYTES # BLD AUTO: 0.02 K/UL (ref 0–0.04)
IMM GRANULOCYTES NFR BLD AUTO: 0.3 % (ref 0–0.5)
LDLC SERPL CALC-MCNC: 52.6 MG/DL (ref 63–159)
LYMPHOCYTES # BLD AUTO: 1.8 K/UL (ref 1–4.8)
LYMPHOCYTES NFR BLD: 27.1 % (ref 18–48)
MAGNESIUM SERPL-MCNC: 2 MG/DL (ref 1.6–2.6)
MCH RBC QN AUTO: 30.9 PG (ref 27–31)
MCHC RBC AUTO-ENTMCNC: 32.5 G/DL (ref 32–36)
MCV RBC AUTO: 95 FL (ref 82–98)
MONOCYTES # BLD AUTO: 0.5 K/UL (ref 0.3–1)
MONOCYTES NFR BLD: 7.6 % (ref 4–15)
NEUTROPHILS # BLD AUTO: 4.2 K/UL (ref 1.8–7.7)
NEUTROPHILS NFR BLD: 62.9 % (ref 38–73)
NONHDLC SERPL-MCNC: 80 MG/DL
NRBC BLD-RTO: 0 /100 WBC
PLATELET # BLD AUTO: 214 K/UL (ref 150–450)
PMV BLD AUTO: 10 FL (ref 9.2–12.9)
POTASSIUM SERPL-SCNC: 5.2 MMOL/L (ref 3.5–5.1)
PROT SERPL-MCNC: 6.6 G/DL (ref 6–8.4)
RBC # BLD AUTO: 4.46 M/UL (ref 4.6–6.2)
SODIUM SERPL-SCNC: 142 MMOL/L (ref 136–145)
T3FREE SERPL-MCNC: 2 PG/ML (ref 2.3–4.2)
T4 FREE SERPL-MCNC: 1.14 NG/DL (ref 0.71–1.51)
TRIGL SERPL-MCNC: 137 MG/DL (ref 30–150)
TSH SERPL DL<=0.005 MIU/L-ACNC: 1.57 UIU/ML (ref 0.4–4)
WBC # BLD AUTO: 6.68 K/UL (ref 3.9–12.7)

## 2021-12-01 PROCEDURE — 93005 ELECTROCARDIOGRAM TRACING: CPT | Mod: PBBFAC,PN | Performed by: INTERNAL MEDICINE

## 2021-12-01 PROCEDURE — 99215 OFFICE O/P EST HI 40 MIN: CPT | Mod: S$PBB,,, | Performed by: INTERNAL MEDICINE

## 2021-12-01 PROCEDURE — 93010 EKG 12-LEAD: ICD-10-PCS | Mod: S$PBB,,, | Performed by: INTERNAL MEDICINE

## 2021-12-01 PROCEDURE — 85025 COMPLETE CBC W/AUTO DIFF WBC: CPT | Performed by: INTERNAL MEDICINE

## 2021-12-01 PROCEDURE — 99215 OFFICE O/P EST HI 40 MIN: CPT | Mod: PBBFAC,PN | Performed by: INTERNAL MEDICINE

## 2021-12-01 PROCEDURE — 80061 LIPID PANEL: CPT | Performed by: INTERNAL MEDICINE

## 2021-12-01 PROCEDURE — 99999 PR PBB SHADOW E&M-EST. PATIENT-LVL V: ICD-10-PCS | Mod: PBBFAC,,, | Performed by: INTERNAL MEDICINE

## 2021-12-01 PROCEDURE — 84443 ASSAY THYROID STIM HORMONE: CPT | Performed by: INTERNAL MEDICINE

## 2021-12-01 PROCEDURE — 36415 COLL VENOUS BLD VENIPUNCTURE: CPT | Mod: PO | Performed by: INTERNAL MEDICINE

## 2021-12-01 PROCEDURE — 84481 FREE ASSAY (FT-3): CPT | Performed by: INTERNAL MEDICINE

## 2021-12-01 PROCEDURE — 83735 ASSAY OF MAGNESIUM: CPT | Performed by: INTERNAL MEDICINE

## 2021-12-01 PROCEDURE — 93010 ELECTROCARDIOGRAM REPORT: CPT | Mod: S$PBB,,, | Performed by: INTERNAL MEDICINE

## 2021-12-01 PROCEDURE — 99999 PR PBB SHADOW E&M-EST. PATIENT-LVL V: CPT | Mod: PBBFAC,,, | Performed by: INTERNAL MEDICINE

## 2021-12-01 PROCEDURE — 84439 ASSAY OF FREE THYROXINE: CPT | Performed by: INTERNAL MEDICINE

## 2021-12-01 PROCEDURE — 99215 PR OFFICE/OUTPT VISIT, EST, LEVL V, 40-54 MIN: ICD-10-PCS | Mod: S$PBB,,, | Performed by: INTERNAL MEDICINE

## 2021-12-01 PROCEDURE — 80053 COMPREHEN METABOLIC PANEL: CPT | Performed by: INTERNAL MEDICINE

## 2021-12-11 ENCOUNTER — TELEPHONE (OUTPATIENT)
Dept: FAMILY MEDICINE | Facility: CLINIC | Age: 86
End: 2021-12-11
Payer: MEDICARE

## 2021-12-11 DIAGNOSIS — R00.1 SINUS BRADYCARDIA: ICD-10-CM

## 2021-12-11 DIAGNOSIS — E03.9 HYPOTHYROIDISM, UNSPECIFIED TYPE: ICD-10-CM

## 2021-12-11 DIAGNOSIS — I11.9 BENIGN HYPERTENSIVE HEART DISEASE WITHOUT HEART FAILURE: ICD-10-CM

## 2021-12-11 DIAGNOSIS — D64.9 ANEMIA, UNSPECIFIED TYPE: ICD-10-CM

## 2021-12-11 DIAGNOSIS — E78.2 MIXED HYPERLIPIDEMIA: Primary | ICD-10-CM

## 2021-12-11 DIAGNOSIS — N18.32 STAGE 3B CHRONIC KIDNEY DISEASE: ICD-10-CM

## 2021-12-11 PROBLEM — Z01.89 ENCOUNTER FOR LABORATORY TEST: Status: ACTIVE | Noted: 2021-12-11

## 2021-12-11 PROBLEM — Z95.5 STATUS POST CORONARY ARTERY STENT PLACEMENT: Status: ACTIVE | Noted: 2021-12-11

## 2021-12-11 PROBLEM — R06.09 DYSPNEA ON EXERTION: Status: ACTIVE | Noted: 2021-12-11

## 2022-01-04 NOTE — TELEPHONE ENCOUNTER
----- Message from Viral Ragsdale sent at 1/4/2022  2:29 PM CST -----  Type: Needs Medical Advice  Who Called:  Amy Hassan (Spouse)    Pharmacy name and phone #:        [facility] - [place], [place] - [address]  [address] [address]  Phone: [phone] Fax: [phone]      Best Call Back Number: [phone]  Additional Information:Caller states that the patient's refill isn't at the pharmacy:    liothyronine (CYTOMEL) 5 MCG Tab  Please call to advise

## 2022-01-04 NOTE — TELEPHONE ENCOUNTER
No new care gaps identified.  Powered by [employer]. Reference number: [id number].   1/04/2022 2:54:50 PM CST

## 2022-01-05 RX ORDER — LIOTHYRONINE SODIUM 5 UG/1
5 TABLET ORAL DAILY
Qty: 90 TABLET | Refills: 3 | OUTPATIENT
Start: 2022-01-05

## 2022-01-05 NOTE — TELEPHONE ENCOUNTER
Refill Routing Note   Medication(s) are not appropriate for processing by [facility] for the following reason(s):      - Drug-Drug Interaction (LEVOTHYROXINE)    [facility] action(s):  Defer Medication-related problems identified: Drug-drug interaction     Medication Therapy Plan: TSH-WNL  --->Care Gap information included in message below if applicable.   Medication reconciliation completed: No   Automatic Epic Generated Protocol Data:        Requested Prescriptions   Pending Prescriptions Disp Refills    liothyronine (CYTOMEL) 5 MCG Tab 90 tablet 3     Sig: Take 1 tablet (5 mcg total) by mouth once daily.       Endocrinology:  Hypothyroid Agents Passed - 1/4/2022  2:54 PM        Passed - Patient is at least 18 years old        Passed - Valid encounter within last 15 months     Recent Visits  Date Type Provider Dept   12/01/21 Office Visit Mitchell Mota MD [facility]   06/25/21 Office Visit Mitchell Mota MD [facility]   03/25/21 Office Visit Mitchell Mota MD [facility]   03/02/20 Office Visit Mitchell Mota MD [facility]   Showing recent visits within past 720 days and meeting all other requirements  Future Appointments  No visits were found meeting these conditions.  Showing future appointments within next 150 days and meeting all other requirements      Future Appointments              In 1 month [facility], [facility] [place] [facility], [place]    In 1 month [facility], [facility] [place] [facility], [place]    In 1 month Mitchell Mota MD [facility]    In 3 months [facility], [facility] [place] [facility], [place]    In 3 months SPE[facility]IMEN, [facility] [place] [facility], [place]    In 3 months MD [facility] - Nephrology, [place]                Passed - Manual Review: FT4 is not required if last TSH is WNL.        Passed - TSH in normal range and within 360 days     TSH   Date Value Ref Range Status   12/01/2021 1.574 0.400 -  4.000 uIU/mL Final   06/18/2021 2.691 0.400 - 4.000 uIU/mL Final   03/17/2021 2.398 0.400 - 4.000 uIU/mL Final              Passed - T4 free within 1080 days     Free T4   Date Value Ref Range Status   12/01/2021 1.14 0.71 - 1.51 ng/dL Final   06/18/2021 1.02 0.71 - 1.51 ng/dL Final   03/17/2021 1.09 0.71 - 1.51 ng/dL Final                    Appointments  past 12m or future 3m with PCP    Date Provider   Last Visit   12/1/2021 Mitchell Mota MD   Next Visit   3/2/2022 Mitchell Mota MD   ED visits in past 90 days: 0        Note composed:7:20 PM 01/04/2022

## 2022-01-06 NOTE — TELEPHONE ENCOUNTER
Provider Staff:     Action required for this patient.    Please note Refusal of medication.            Requested Prescriptions     Refused Prescriptions Disp Refills    liothyronine (CYTOMEL) 5 MCG Tab 90 tablet 3     Sig: Take 1 tablet (5 mcg total) by mouth once daily.     Refused By: LEANN RIVERO     Reason for Refusal: Patient has requested refill too soon      Thanks!  [facility]   Note composed: 01/06/2022 7:17 AM

## 2022-03-02 ENCOUNTER — TELEPHONE (OUTPATIENT)
Dept: FAMILY MEDICINE | Facility: CLINIC | Age: 87
End: 2022-03-02
Payer: MEDICARE

## 2022-03-02 RX ORDER — LIOTHYRONINE SODIUM 5 UG/1
5 TABLET ORAL DAILY
Qty: 90 TABLET | Refills: 3 | Status: SHIPPED | OUTPATIENT
Start: 2022-03-02 | End: 2023-01-10

## 2022-03-02 NOTE — TELEPHONE ENCOUNTER
----- Message from [place] sent at 3/2/2022  8:03 AM CST -----  Contact: GAIL STANFORD [[id number]]  Type: Patient Call Back    Who called:GAIL STANFORD [[id number]]    What is the request in detail: The patient would like a call in regards to lab work. He needs to know if he should reschedule his appt.     [facility] the [facility]?    Would the patient rather a call back or a response via [facility]?     Best call back number:  [phone] (mobile)    Additional Information:

## 2022-03-02 NOTE — TELEPHONE ENCOUNTER
No new care gaps identified.  Powered by [employer]. Reference number: [id number].   3/02/2022 9:01:20 AM CST

## 2022-03-02 NOTE — TELEPHONE ENCOUNTER
Spoke w/ pt. Fasting lab sched at [facility](per pt request) at 740am on 3/8/22 and appt w/ Dr Mota at 820am on 3/11/22. Pt wrote this information down and repeated back correctly.

## 2022-03-02 NOTE — TELEPHONE ENCOUNTER
Spoke w/ pt to resched lab and appt. He is requesting a refill on his cytomel. Last refill date 6/16/21. Please approve asap, as [facility]

## 2022-03-08 ENCOUNTER — LAB VISIT (OUTPATIENT)
Dept: LAB | Facility: HOSPITAL | Age: 87
End: 2022-03-08
Attending: INTERNAL MEDICINE
Payer: MEDICARE

## 2022-03-08 DIAGNOSIS — E03.9 HYPOTHYROIDISM, UNSPECIFIED TYPE: ICD-10-CM

## 2022-03-08 DIAGNOSIS — D64.9 ANEMIA, UNSPECIFIED TYPE: ICD-10-CM

## 2022-03-08 DIAGNOSIS — N18.32 STAGE 3B CHRONIC KIDNEY DISEASE: ICD-10-CM

## 2022-03-08 DIAGNOSIS — E78.2 MIXED HYPERLIPIDEMIA: ICD-10-CM

## 2022-03-08 DIAGNOSIS — I11.9 BENIGN HYPERTENSIVE HEART DISEASE WITHOUT HEART FAILURE: ICD-10-CM

## 2022-03-08 LAB
ALBUMIN SERPL BCP-MCNC: 3.6 G/DL (ref 3.5–5.2)
ALP SERPL-CCNC: 57 U/L (ref 55–135)
ALT SERPL W/O P-5'-P-CCNC: 23 U/L (ref 10–44)
ANION GAP SERPL CALC-SCNC: 11 MMOL/L (ref 8–16)
AST SERPL-CCNC: 22 U/L (ref 10–40)
BASOPHILS # BLD AUTO: 0.03 K/UL (ref 0–0.2)
BASOPHILS NFR BLD: 0.5 % (ref 0–1.9)
BILIRUB SERPL-MCNC: 0.6 MG/DL (ref 0.1–1)
BUN SERPL-MCNC: 20 MG/DL (ref 8–23)
CALCIUM SERPL-MCNC: 9.5 MG/DL (ref 8.7–10.5)
CHLORIDE SERPL-SCNC: 105 MMOL/L (ref 95–110)
CO2 SERPL-SCNC: 25 MMOL/L (ref 23–29)
CREAT SERPL-MCNC: 1.6 MG/DL (ref 0.5–1.4)
DIFFERENTIAL METHOD: ABNORMAL
EOSINOPHIL # BLD AUTO: 0.1 K/UL (ref 0–0.5)
EOSINOPHIL NFR BLD: 1.4 % (ref 0–8)
ERYTHROCYTE [DISTWIDTH] IN BLOOD BY AUTOMATED COUNT: 12.7 % (ref 11.5–14.5)
EST. GFR  (AFRICAN AMERICAN): 43.2 ML/MIN/1.73 M^2
EST. GFR  (NON AFRICAN AMERICAN): 37.4 ML/MIN/1.73 M^2
GLUCOSE SERPL-MCNC: 92 MG/DL (ref 70–110)
HCT VFR BLD AUTO: 41.2 % (ref 40–54)
HGB BLD-MCNC: 13.7 G/DL (ref 14–18)
IMM GRANULOCYTES # BLD AUTO: 0.04 K/UL (ref 0–0.04)
IMM GRANULOCYTES NFR BLD AUTO: 0.6 % (ref 0–0.5)
LYMPHOCYTES # BLD AUTO: 1.9 K/UL (ref 1–4.8)
LYMPHOCYTES NFR BLD: 29.8 % (ref 18–48)
MCH RBC QN AUTO: 31.6 PG (ref 27–31)
MCHC RBC AUTO-ENTMCNC: 33.3 G/DL (ref 32–36)
MCV RBC AUTO: 95 FL (ref 82–98)
MONOCYTES # BLD AUTO: 0.6 K/UL (ref 0.3–1)
MONOCYTES NFR BLD: 8.4 % (ref 4–15)
NEUTROPHILS # BLD AUTO: 3.9 K/UL (ref 1.8–7.7)
NEUTROPHILS NFR BLD: 59.3 % (ref 38–73)
NRBC BLD-RTO: 0 /100 WBC
PLATELET # BLD AUTO: 192 K/UL (ref 150–450)
PMV BLD AUTO: 10.4 FL (ref 9.2–12.9)
POTASSIUM SERPL-SCNC: 5.2 MMOL/L (ref 3.5–5.1)
PROT SERPL-MCNC: 6.5 G/DL (ref 6–8.4)
RBC # BLD AUTO: 4.34 M/UL (ref 4.6–6.2)
SODIUM SERPL-SCNC: 141 MMOL/L (ref 136–145)
T3FREE SERPL-MCNC: 2.1 PG/ML (ref 2.3–4.2)
T4 FREE SERPL-MCNC: 1.18 NG/DL (ref 0.71–1.51)
TSH SERPL DL<=0.005 MIU/L-ACNC: 3.49 UIU/ML (ref 0.4–4)
WBC # BLD AUTO: 6.52 K/UL (ref 3.9–12.7)

## 2022-03-08 PROCEDURE — 84481 FREE ASSAY (FT-3): CPT | Performed by: INTERNAL MEDICINE

## 2022-03-08 PROCEDURE — 80053 COMPREHEN METABOLIC PANEL: CPT | Performed by: INTERNAL MEDICINE

## 2022-03-08 PROCEDURE — 84443 ASSAY THYROID STIM HORMONE: CPT | Performed by: INTERNAL MEDICINE

## 2022-03-08 PROCEDURE — 84439 ASSAY OF FREE THYROXINE: CPT | Performed by: INTERNAL MEDICINE

## 2022-03-08 PROCEDURE — 36415 COLL VENOUS BLD VENIPUNCTURE: CPT | Mod: PO | Performed by: INTERNAL MEDICINE

## 2022-03-08 PROCEDURE — 85025 COMPLETE CBC W/AUTO DIFF WBC: CPT | Performed by: INTERNAL MEDICINE

## 2022-03-16 ENCOUNTER — OFFICE VISIT (OUTPATIENT)
Dept: FAMILY MEDICINE | Facility: CLINIC | Age: 87
End: 2022-03-16
Payer: MEDICARE

## 2022-03-16 VITALS
DIASTOLIC BLOOD PRESSURE: 60 MMHG | WEIGHT: 166.56 LBS | SYSTOLIC BLOOD PRESSURE: 135 MMHG | OXYGEN SATURATION: 97 % | BODY MASS INDEX: 31.45 KG/M2 | HEIGHT: 61 IN | HEART RATE: 55 BPM

## 2022-03-16 DIAGNOSIS — E66.9 OBESITY (BMI 30.0-34.9): ICD-10-CM

## 2022-03-16 DIAGNOSIS — N18.32 STAGE 3B CHRONIC KIDNEY DISEASE: ICD-10-CM

## 2022-03-16 DIAGNOSIS — R13.19 ESOPHAGEAL DYSPHAGIA: ICD-10-CM

## 2022-03-16 DIAGNOSIS — Z95.5 STATUS POST CORONARY ARTERY STENT PLACEMENT: ICD-10-CM

## 2022-03-16 DIAGNOSIS — E03.9 HYPOTHYROIDISM, UNSPECIFIED TYPE: ICD-10-CM

## 2022-03-16 DIAGNOSIS — E78.2 MIXED HYPERLIPIDEMIA: ICD-10-CM

## 2022-03-16 DIAGNOSIS — Z95.1 S/P CABG X 3: ICD-10-CM

## 2022-03-16 DIAGNOSIS — R60.0 BILATERAL LOWER EXTREMITY EDEMA: ICD-10-CM

## 2022-03-16 DIAGNOSIS — Z01.89 ENCOUNTER FOR LABORATORY TEST: ICD-10-CM

## 2022-03-16 DIAGNOSIS — E87.5 HYPERKALEMIA: ICD-10-CM

## 2022-03-16 DIAGNOSIS — I10 BENIGN ESSENTIAL HYPERTENSION: Primary | ICD-10-CM

## 2022-03-16 PROCEDURE — 99215 OFFICE O/P EST HI 40 MIN: CPT | Mod: PBBFAC,PN | Performed by: INTERNAL MEDICINE

## 2022-03-16 PROCEDURE — 99215 PR OFFICE/OUTPT VISIT, EST, LEVL V, 40-54 MIN: ICD-10-PCS | Mod: S$PBB,,, | Performed by: INTERNAL MEDICINE

## 2022-03-16 PROCEDURE — 99999 PR PBB SHADOW E&M-EST. PATIENT-LVL V: CPT | Mod: PBBFAC,,, | Performed by: INTERNAL MEDICINE

## 2022-03-16 PROCEDURE — 99999 PR PBB SHADOW E&M-EST. PATIENT-LVL V: ICD-10-PCS | Mod: PBBFAC,,, | Performed by: INTERNAL MEDICINE

## 2022-03-16 PROCEDURE — 99215 OFFICE O/P EST HI 40 MIN: CPT | Mod: S$PBB,,, | Performed by: INTERNAL MEDICINE

## 2022-03-16 RX ORDER — FUROSEMIDE 20 MG/1
TABLET ORAL
Qty: 50 TABLET | Refills: 1 | Status: SHIPPED | OUTPATIENT
Start: 2022-03-16 | End: 2023-11-15

## 2022-03-16 RX ORDER — TELMISARTAN 20 MG/1
20 TABLET ORAL DAILY
Qty: 90 TABLET | Refills: 1 | Status: SHIPPED | OUTPATIENT
Start: 2022-03-16 | End: 2022-08-30

## 2022-03-16 NOTE — PROGRESS NOTES
Subjective:       Patient ID: Shashi Hassan is a 89 y.o. male.    Chief Complaint: Follow-up    HPI:  Here today for reassessment and go over his lab work.  Benign essential hypertension: Maintain < 2 Gm Na a day diet, and monitor BP at home; keep a log for office review.  Sit 4-5 minutes before taking blood pressure reading at home.  Blood pressure at home has been averaging around 138/63; manually here today he is 135/60 with pulse 55. .  -     T4, Free; Future; Expected date: 03/16/2022  -     TSH; Future; Expected date: 03/16/2022  -     Lipid Panel; Future; Expected date: 03/16/2022  -     Magnesium; Future; Expected date: 03/16/2022  -     CBC Auto Differential; Future; Expected date: 03/16/2022  -     Basic Metabolic Panel; Future; Expected date: 03/16/2022  -     telmisartan (MICARDIS) 20 MG Tab; Take 1 tablet (20 mg total) by mouth once daily.  Dispense: 90 tablet; Refill: 1  -     furosemide (LASIX) 20 MG tablet; 20 mg po every Mon, Wed, and Fri  Dispense: 50 tablet; Refill: 1  Stage 3b chronic kidney disease; keep hydrated during the day; no NSA ID agents; use Tylenol over-the-counter for any pain or discomfort.  -     CBC Auto Differential; Future; Expected date: 03/16/2022  -     Basic Metabolic Panel; Future; Expected date: 03/16/2022  -     furosemide (LASIX) 20 MG tablet; 20 mg po every Mon, Wed, and Fri  Dispense: 50 tablet; Refill: 1  -     telmisartan (MICARDIS) 20 MG Tab; Take 1 tablet (20 mg total) by mouth once daily.  Dispense: 90 tablet; Refill: 1  Mixed hyperlipidemia; Maintain low fat high fiber diet, exercise regularly. Weight reduction continue attempts; continue atorvastatin.  Lipid profile:  Cholesterol 123/triglyceride 137/HDL 43/LDL 52.6.  LDL goal less than 60. With a history of 3 vessel CABG.  -     Lipid Panel; Future; Expected date: 03/16/2022  -     furosemide (LASIX) 20 MG tablet; 20 mg po every Mon, Wed, and Fri  Dispense: 50 tablet; Refill: 1  S/P CABG x 3; CAD has been  stable without any chest pain, shortness of breath, or palpitations. Keep follow up with cardiologist as directed.   -     Lipid Panel; Future; Expected date: 03/16/2022  -     Basic Metabolic Panel; Future; Expected date: 03/16/2022  Status post coronary artery stent placement; CAD has been stable without any chest pain, shortness of breath, or palpitations. Keep follow up with cardiologist as directed.   Hypothyroidism, unspecified type; has resumed cytomel; cont levothyroxine; TFT w f/u.  TSH 3.493; has been off Cytomel for 2 months with problems getting it from the pharmacist; now back on; free T4 is 1.18; free T3 is reduced at 2.1.  -     T4, Free; Future; Expected date: 03/16/2022  -     TSH; Future; Expected date: 03/16/2022  Esophageal dysphagia; cut food into smaller pieces w sips of fluid in between.  Foot has been sticking in the upper part of his esophagus but he can swallow pills mostly giving him an issue and some solid food.  Advised to also drink more liquid in between his swallows of his pills and solid food and eat smaller amounts each time to swallow  -     Ambulatory referral/consult to Gastroenterology; Future; Expected date: 03/23/2022 Dr Antoine. For EGD; and further evaluation and treatment  Encounter for laboratory test; reviewed w pt and wife and ordered for f/u.   Obesity (BMI 30.0-34.9); Caloric restriction w regular exercise and weight reduction.  Hyperkalemia; borderline with potassium of 5.2; change spironolactone to lasix 20 mg every MWF.  Discussed foods to avoid that may also raise his potassium levels.  -     Basic Metabolic Panel; Future; Expected date: 03/16/2022  Bilateral lower extremity dyana; Maintain less than 2 g sodium diet; elevate lower extremities at home; use compression stockings if possible.  Use Lasix at 20 mg every Monday Wednesday and Friday instead of spironolactone  Total time 3:05 p.m. through 3:54 p.m..  Greater than 50% of time spent in discussion,  "counseling, and review labs reviewed with patient at length in ordered for follow-up.  Medications reviewed and addressed.  Different diagnosis were discussed with patient as well as his wife, as well as plan of care.  GI consult Dr. Antoine also placed      Review of Systems   Constitutional: Negative for appetite change and unexpected weight change.   HENT: Negative for congestion, postnasal drip, rhinorrhea and sinus pressure.         Denies seasonal allergies, or perennial allergies   Eyes: Negative for discharge and itching.   Respiratory: Negative for cough, chest tightness, shortness of breath and wheezing.    Cardiovascular: Negative for chest pain, palpitations and leg swelling.   Gastrointestinal: Negative for abdominal pain, blood in stool, constipation, diarrhea, nausea and vomiting.   Endocrine: Negative for polydipsia, polyphagia and polyuria.   Genitourinary: Negative for dysuria and hematuria.   Musculoskeletal: Negative for arthralgias and myalgias.   Skin: Negative for rash.   Allergic/Immunologic: Negative for environmental allergies and food allergies.   Neurological: Negative for tremors, seizures and headaches.   Hematological: Negative for adenopathy. Does not bruise/bleed easily.   Psychiatric/Behavioral: Negative for dysphoric mood. The patient is not nervous/anxious.         Denies anxiety or depression.      Objective:        Vitals:    03/16/22 1450   BP: 135/60   Pulse: (!) 55   SpO2: 97%   Weight: 75.5 kg (166 lb 8.9 oz)   Height: 5' 1" (1.549 m)       BMI Readings from Last 3 Encounters:   03/16/22 31.47 kg/m²   12/01/21 30.99 kg/m²   09/30/21 31.32 kg/m²        Wt Readings from Last 3 Encounters:   03/16/22 1450 75.5 kg (166 lb 8.9 oz)   12/01/21 1301 74.4 kg (164 lb 0.4 oz)   09/30/21 1031 75.2 kg (165 lb 12.6 oz)        BP Readings from Last 3 Encounters:   03/16/22 135/60   12/01/21 138/64   09/30/21 132/80        There are no preventive care reminders to display for this " patient.     Health Maintenance Due   Topic Date Due    Shingles Vaccine (1 of 2) Never done    TETANUS VACCINE  09/19/2015         Past Medical History:   Diagnosis Date    Benign essential hypertension     Benign hypertensive heart disease     Carotid atherosclerosis     Chronic kidney disease, stage 3     Dr. Persaud    CKD (chronic kidney disease), stage III     Coronary arteriosclerosis in native artery     Edema     Gastroesophageal reflux disease     Hypothyroid     Mixed hyperlipidemia     Obesity     Osteoarthritis of finger     Paroxysmal supraventricular tachycardia     Paroxysmal ventricular tachycardia     Right bundle branch block     S/P CABG x 3 2008    S/P coronary artery stent placement     2 in Jan,2017; 1 in April 2017; Dr Garnica placed; sees Dr Chase.    Shortness of breath     Ventricular tachycardia        Past Surgical History:   Procedure Laterality Date    BPH surgery  2006    COLONOSCOPY  07/2005    polyps few removed; benign; repeat in 10 yrs. Past due.    CORONARY ANGIOGRAPHY N/A 12/4/2018    Procedure: ANGIOGRAM, CORONARY ARTERY;  Surgeon: Darrell Garnica MD;  Location: [facility] CATH;  Service: Cardiology;  Laterality: N/A;    CORONARY ARTERY BYPASS GRAFT  2009    Done at [facility] in [place], [place]: FINA-LAD, SEQ SVG OM    coronary stent placements      2 in Jan, 2017; 1 in april, 2017 by dr Garnica.       Social History     Tobacco Use    Smoking status: Never Smoker    Smokeless tobacco: Never Used   Substance Use Topics    Alcohol use: No    Drug use: No       Family History   Problem Relation Age of Onset    Colon cancer Mother     Diabetes Mellitus Mother     Hypertension Mother     Heart disease Mother     Heart attack Father     Stroke Father     Hypertension Father     Heart disease Father     Hypertension Brother        Review of patient's allergies indicates:  No Known Allergies    Current Outpatient Medications on File Prior to Visit    Medication Sig Dispense Refill    amiodarone (PACERONE) 200 MG Tab Take by mouth. Half tablet every other day, alternate w 1 whole tablet every other day      atorvastatin (LIPITOR) 40 MG tablet Take 1 tablet (40 mg total) by mouth once daily. One daily 90 tablet 3    cholecalciferol, vitamin D3, 2,000 unit Cap Take 2,000 mg by mouth.       clopidogrel (PLAVIX) 75 mg tablet Take 75 mg by mouth once daily.      coenzyme Q10 100 mg capsule Take 100 mg by mouth 2 (two) times a day.      folic acid (FOLVITE) 800 MCG Tab Take 800 mcg by mouth 2 (two) times daily.      FOLIC ACID/MULTIVIT-MIN/LUTEIN (CENTRUM SILVER ORAL) Take 1 tablet by mouth once daily.      GLUCOSAMINE HCL/CHONDR SAAVEDRA A NA (OSTEO BI-FLEX ORAL) Take 1 tablet by mouth 2 (two) times daily.      isosorbide mononitrate (IMDUR) 30 MG 24 hr tablet Take 15 mg by mouth once daily.       levothyroxine (SYNTHROID) 125 MCG tablet TAKE 1/2 TABLET EVERY OTHER DAY 45 tablet 1    levothyroxine (SYNTHROID) 75 MCG tablet TAKE 1 TABLET EVERY OTHER DAY ALTERNATING WITH 62.5 MCG EVERY OTHER DAY AS DIRECTED 45 tablet 3    liothyronine (CYTOMEL) 5 MCG Tab Take 1 tablet (5 mcg total) by mouth once daily. 90 tablet 3    metoprolol tartrate (LOPRESSOR) 25 MG tablet TAKE 1 TABLET EVERY DAY 90 tablet 3    RANEXA 500 mg Tb12 TK 1 T PO  QD  2    tamsulosin (FLOMAX) 0.4 mg Cp24 Take 0.4 mg by mouth once daily.      [DISCONTINUED] spironolactone (ALDACTONE) 25 MG tablet TAKE 1/2 TABLET EVERY OTHER DAY 45 tablet 1    [DISCONTINUED] telmisartan (MICARDIS) 20 MG Tab TAKE 1 TABLET EVERY DAY 90 tablet 1    omega-3 fatty acids-vitamin E 1,000 mg Cap Take 1 capsule by mouth 2 (two) times daily.        Current Facility-Administered Medications on File Prior to Visit   Medication Dose Route Frequency Provider Last Rate Last Admin    0.9%  NaCl infusion   Intravenous Continuous Darrellmoriah Garnica MD   Stopped at 12/04/18 1115       Physical Exam  Vitals reviewed.    Constitutional:       Appearance: He is well-developed.   HENT:      Head: Normocephalic and atraumatic.   Neck:      Thyroid: No thyromegaly.   Cardiovascular:      Rate and Rhythm: Normal rate and regular rhythm.      Heart sounds: Normal heart sounds. No murmur heard.    No gallop.   Pulmonary:      Effort: Pulmonary effort is normal. No respiratory distress.      Breath sounds: Normal breath sounds. No wheezing or rales.   Abdominal:      General: Bowel sounds are normal. There is no distension.      Palpations: Abdomen is soft.      Tenderness: There is no abdominal tenderness. There is no guarding or rebound.   Musculoskeletal:         General: Normal range of motion.      Cervical back: Normal range of motion and neck supple.   Lymphadenopathy:      Cervical: No cervical adenopathy.   Skin:     Findings: No rash.   Neurological:      Mental Status: He is alert and oriented to person, place, and time.      Comments: Moves all 4 extremities fine.   Psychiatric:         Behavior: Behavior normal.         Thought Content: Thought content normal.         Assessment:       1. Benign essential hypertension    2. Stage 3b chronic kidney disease    3. Mixed hyperlipidemia    4. S/P CABG x 3    5. Status post coronary artery stent placement    6. Hypothyroidism, unspecified type    7. Esophageal dysphagia    8. Encounter for laboratory test    9. Obesity (BMI 30.0-34.9)    10. Hyperkalemia    11. CKD (chronic kidney disease), stage III    12. Bilateral lower extremity edema        Plan:       Benign essential hypertension: Maintain < 2 Gm Na a day diet, and monitor BP at home; keep a log.  -     T4, Free; Future; Expected date: 03/16/2022  -     TSH; Future; Expected date: 03/16/2022  -     Lipid Panel; Future; Expected date: 03/16/2022  -     Magnesium; Future; Expected date: 03/16/2022  -     CBC Auto Differential; Future; Expected date: 03/16/2022  -     Basic Metabolic Panel; Future; Expected date: 03/16/2022  -      telmisartan (MICARDIS) 20 MG Tab; Take 1 tablet (20 mg total) by mouth once daily.  Dispense: 90 tablet; Refill: 1  -     furosemide (LASIX) 20 MG tablet; 20 mg po every Mon, Wed, and Fri  Dispense: 50 tablet; Refill: 1    Stage 3b chronic kidney disease; keep hydrated during the day  -     CBC Auto Differential; Future; Expected date: 03/16/2022  -     Basic Metabolic Panel; Future; Expected date: 03/16/2022  -     furosemide (LASIX) 20 MG tablet; 20 mg po every Mon, Wed, and Fri  Dispense: 50 tablet; Refill: 1        -     telmisartan (MICARDIS) 20 MG Tab; Take 1 tablet (20 mg total) by mouth once daily.   Dispense: 90 tablet; Refill: 1    Mixed hyperlipidemia; Maintain low fat high fiber diet, exercise regularly. Weight reduction where indicated. Cont atorvastatin  -     Lipid Panel; Future; Expected date: 03/16/2022  -     furosemide (LASIX) 20 MG tablet; 20 mg po every Mon, Wed, and Fri  Dispense: 50 tablet; Refill: 1    S/P CABG x 3; CAD has been stable without any chest pain, shortness of breath, or palpitations. Keep follow up with cardiologist as directed.   -     Lipid Panel; Future; Expected date: 03/16/2022  -     Basic Metabolic Panel; Future; Expected date: 03/16/2022    Status post coronary artery stent placement; CAD has been stable without any chest pain, shortness of breath, or palpitations. Keep follow up with cardiologist as directed.     Hypothyroidism, unspecified type; has resumed cytomel; cont levothyroxine; TFT w f/u.   -     T4, Free; Future; Expected date: 03/16/2022  -     TSH; Future; Expected date: 03/16/2022    Esophageal dysphagia; cut food into smaller pieces w sips of fluid in between.   -     Ambulatory referral/consult to Gastroenterology; Future; Expected date: 03/23/2022 Dr Antoine. For EGD    Encounter for laboratory test; reviewed w pt and wife and ordered for f/u.     Obesity (BMI 30.0-34.9); Caloric restriction w regular exercise and weight reduction.    Hyperkalemia;  change spironolactone to lasix 20 mg every MWF.  Discussed foods to avoid that also could will raise his potassium levels.  -     Basic Metabolic Panel; Future; Expected date: 03/16/2022    Bilateral lower extremity dyana; Maintain less than 2 g sodium diet; elevate lower extremities at home; use compression stockings if possible. Have changed spironolactone to Lasix 20 mg every Monday Wednesday and Friday.  To use medium strength in the compression stockings 16- 18 mmHg and to below the knees.  Can take them off when he comes home and elevate his legs while he is watching TV.  He is to go to sleep without the compression stockings on.

## 2022-03-16 NOTE — PATIENT INSTRUCTIONS
Benign essential hypertension: Maintain < 2 Gm Na a day diet, and monitor BP at home; keep a log.  -     T4, Free; Future; Expected date: 03/16/2022  -     TSH; Future; Expected date: 03/16/2022  -     Lipid Panel; Future; Expected date: 03/16/2022  -     Magnesium; Future; Expected date: 03/16/2022  -     CBC Auto Differential; Future; Expected date: 03/16/2022  -     Basic Metabolic Panel; Future; Expected date: 03/16/2022  -     telmisartan (MICARDIS) 20 MG Tab; Take 1 tablet (20 mg total) by mouth once daily.  Dispense: 90 tablet; Refill: 1  -     furosemide (LASIX) 20 MG tablet; 20 mg po every Mon, Wed, and Fri  Dispense: 50 tablet; Refill: 1    Stage 3b chronic kidney disease; keep hydrated during the day  -     CBC Auto Differential; Future; Expected date: 03/16/2022  -     Basic Metabolic Panel; Future; Expected date: 03/16/2022  -     furosemide (LASIX) 20 MG tablet; 20 mg po every Mon, Wed, and Fri  Dispense: 50 tablet; Refill: 1    Mixed hyperlipidemia; Maintain low fat high fiber diet, exercise regularly. Weight reduction where indicated. Cont atorvastatin  -     Lipid Panel; Future; Expected date: 03/16/2022  -     furosemide (LASIX) 20 MG tablet; 20 mg po every Mon, Wed, and Fri  Dispense: 50 tablet; Refill: 1    S/P CABG x 3; CAD has been stable without any chest pain, shortness of breath, or palpitations. Keep follow up with cardiologist as directed.   -     Lipid Panel; Future; Expected date: 03/16/2022  -     Basic Metabolic Panel; Future; Expected date: 03/16/2022    Status post coronary artery stent placement; CAD has been stable without any chest pain, shortness of breath, or palpitations. Keep follow up with cardiologist as directed.     Hypothyroidism, unspecified type; has resumed cytomel; cont levothyroxine; TFT w f/u.   -     T4, Free; Future; Expected date: 03/16/2022  -     TSH; Future; Expected date: 03/16/2022    Esophageal dysphagia; cut food into smaller pieces w sips of fluid in  between.   -     Ambulatory referral/consult to Gastroenterology; Future; Expected date: 03/23/2022 Dr Antoine. For EGD    Encounter for laboratory test; reviewed w pt and wife and ordered for f/u.     Obesity (BMI 30.0-34.9); Caloric restriction w regular exercise and weight reduction.    Hyperkalemia; change spironolactone to lasix 20 mg every MWF.   -     Basic Metabolic Panel; Future; Expected date: 03/16/2022    CKD (chronic kidney disease), stage III; stable. Keep well hydrated  -     telmisartan (MICARDIS) 20 MG Tab; Take 1 tablet (20 mg total) by mouth once daily.  Dispense: 90 tablet; Refill: 1    Bilateral lower extremity dyana; Maintain less than 2 g sodium diet; elevate lower extremities at home; use compression stockings if possible.

## 2022-04-06 ENCOUNTER — LAB VISIT (OUTPATIENT)
Dept: LAB | Facility: HOSPITAL | Age: 87
End: 2022-04-06
Attending: INTERNAL MEDICINE
Payer: MEDICARE

## 2022-04-06 DIAGNOSIS — N18.32 STAGE 3B CHRONIC KIDNEY DISEASE: ICD-10-CM

## 2022-04-06 LAB
ALBUMIN SERPL BCP-MCNC: 3.8 G/DL (ref 3.5–5.2)
ANION GAP SERPL CALC-SCNC: 7 MMOL/L (ref 8–16)
BASOPHILS # BLD AUTO: 0.02 K/UL (ref 0–0.2)
BASOPHILS NFR BLD: 0.3 % (ref 0–1.9)
BUN SERPL-MCNC: 24 MG/DL (ref 8–23)
CALCIUM SERPL-MCNC: 9.5 MG/DL (ref 8.7–10.5)
CHLORIDE SERPL-SCNC: 104 MMOL/L (ref 95–110)
CO2 SERPL-SCNC: 27 MMOL/L (ref 23–29)
CREAT SERPL-MCNC: 1.7 MG/DL (ref 0.5–1.4)
DIFFERENTIAL METHOD: ABNORMAL
EOSINOPHIL # BLD AUTO: 0.1 K/UL (ref 0–0.5)
EOSINOPHIL NFR BLD: 1.2 % (ref 0–8)
ERYTHROCYTE [DISTWIDTH] IN BLOOD BY AUTOMATED COUNT: 12.6 % (ref 11.5–14.5)
EST. GFR  (AFRICAN AMERICAN): 40.2 ML/MIN/1.73 M^2
EST. GFR  (NON AFRICAN AMERICAN): 34.7 ML/MIN/1.73 M^2
GLUCOSE SERPL-MCNC: 94 MG/DL (ref 70–110)
HCT VFR BLD AUTO: 42.3 % (ref 40–54)
HGB BLD-MCNC: 13.5 G/DL (ref 14–18)
IMM GRANULOCYTES # BLD AUTO: 0.04 K/UL (ref 0–0.04)
IMM GRANULOCYTES NFR BLD AUTO: 0.6 % (ref 0–0.5)
LYMPHOCYTES # BLD AUTO: 2 K/UL (ref 1–4.8)
LYMPHOCYTES NFR BLD: 30.1 % (ref 18–48)
MCH RBC QN AUTO: 30.3 PG (ref 27–31)
MCHC RBC AUTO-ENTMCNC: 31.9 G/DL (ref 32–36)
MCV RBC AUTO: 95 FL (ref 82–98)
MONOCYTES # BLD AUTO: 0.6 K/UL (ref 0.3–1)
MONOCYTES NFR BLD: 9.4 % (ref 4–15)
NEUTROPHILS # BLD AUTO: 3.8 K/UL (ref 1.8–7.7)
NEUTROPHILS NFR BLD: 58.4 % (ref 38–73)
NRBC BLD-RTO: 0 /100 WBC
PHOSPHATE SERPL-MCNC: 3 MG/DL (ref 2.7–4.5)
PLATELET # BLD AUTO: 243 K/UL (ref 150–450)
PMV BLD AUTO: 10.4 FL (ref 9.2–12.9)
POTASSIUM SERPL-SCNC: 5.1 MMOL/L (ref 3.5–5.1)
PTH-INTACT SERPL-MCNC: 34.4 PG/ML (ref 9–77)
RBC # BLD AUTO: 4.46 M/UL (ref 4.6–6.2)
SODIUM SERPL-SCNC: 138 MMOL/L (ref 136–145)
WBC # BLD AUTO: 6.52 K/UL (ref 3.9–12.7)

## 2022-04-06 PROCEDURE — 80069 RENAL FUNCTION PANEL: CPT | Performed by: INTERNAL MEDICINE

## 2022-04-06 PROCEDURE — 83970 ASSAY OF PARATHORMONE: CPT | Performed by: INTERNAL MEDICINE

## 2022-04-06 PROCEDURE — 36415 COLL VENOUS BLD VENIPUNCTURE: CPT | Mod: PO | Performed by: INTERNAL MEDICINE

## 2022-04-06 PROCEDURE — 85025 COMPLETE CBC W/AUTO DIFF WBC: CPT | Performed by: INTERNAL MEDICINE

## 2022-04-13 DIAGNOSIS — E78.5 DYSLIPIDEMIA: ICD-10-CM

## 2022-04-13 DIAGNOSIS — Z95.1 S/P CABG X 3: ICD-10-CM

## 2022-04-13 DIAGNOSIS — E78.2 MIXED HYPERLIPIDEMIA: ICD-10-CM

## 2022-04-13 RX ORDER — ATORVASTATIN CALCIUM 40 MG/1
TABLET, FILM COATED ORAL
Qty: 90 TABLET | Refills: 2 | Status: SHIPPED | OUTPATIENT
Start: 2022-04-13

## 2022-04-13 NOTE — TELEPHONE ENCOUNTER
No new care gaps identified.  Powered by [employer]. Reference number: [id number].   4/13/2022 1:48:51 PM CDT

## 2022-04-13 NOTE — TELEPHONE ENCOUNTER
Refill Authorization Note   Shashi Hassan  is requesting a refill authorization.  Brief Assessment and Rationale for Refill:  Approve     Medication Therapy Plan:       Medication Reconciliation Completed: No   Comments:     No Care Gaps recommended.     Note composed:3:15 PM 04/13/2022

## 2022-04-20 DIAGNOSIS — K59.00 CONSTIPATION, UNSPECIFIED CONSTIPATION TYPE: ICD-10-CM

## 2022-04-20 DIAGNOSIS — E03.9 HYPOTHYROIDISM, UNSPECIFIED TYPE: ICD-10-CM

## 2022-04-21 NOTE — TELEPHONE ENCOUNTER
Refill Routing Note   Medication(s) are not appropriate for processing by [facility] for the following reason(s):      - Drug-Drug Interaction (levothyroxine and liothyronine)    [facility] action(s):  Defer Medication-related problems identified: Drug-drug interaction     Medication Therapy Plan: Drug drug interaction: levothyroxine and liothyronine  Medication reconciliation completed: No     Appointments  past 12m or future 3m with PCP    Date Provider   Last Visit   3/16/2022 Mitchell Mota MD   Next Visit   6/23/2022 Mitchell Mota MD   ED visits in past 90 days: 0        Note composed:1:35 PM 04/21/2022

## 2022-04-21 NOTE — TELEPHONE ENCOUNTER
No new care gaps identified.  Powered by [employer]. Reference number: [id number].   4/20/2022 7:27:10 PM CDT

## 2022-04-22 RX ORDER — LEVOTHYROXINE SODIUM 125 UG/1
TABLET ORAL
Qty: 23 TABLET | Refills: 3 | Status: SHIPPED | OUTPATIENT
Start: 2022-04-22 | End: 2023-05-03

## 2022-04-26 ENCOUNTER — TELEPHONE (OUTPATIENT)
Dept: NEPHROLOGY | Facility: CLINIC | Age: 87
End: 2022-04-26
Payer: MEDICARE

## 2022-04-26 NOTE — TELEPHONE ENCOUNTER
Patient missed an appointment this morning with Dr. Persaud. He was called and given verbal test results for kidney functions and he understood. Please review if he needs to come in sooner. I have him on my personal wait list for May.

## 2022-05-27 ENCOUNTER — TELEPHONE (OUTPATIENT)
Dept: NEPHROLOGY | Facility: CLINIC | Age: 87
End: 2022-05-27
Payer: MEDICARE

## 2022-06-01 ENCOUNTER — OFFICE VISIT (OUTPATIENT)
Dept: NEPHROLOGY | Facility: CLINIC | Age: 87
End: 2022-06-01
Payer: MEDICARE

## 2022-06-01 VITALS
BODY MASS INDEX: 29.98 KG/M2 | DIASTOLIC BLOOD PRESSURE: 70 MMHG | HEART RATE: 62 BPM | HEIGHT: 61 IN | OXYGEN SATURATION: 96 % | SYSTOLIC BLOOD PRESSURE: 122 MMHG | WEIGHT: 158.81 LBS

## 2022-06-01 DIAGNOSIS — E03.9 HYPOTHYROIDISM, UNSPECIFIED TYPE: ICD-10-CM

## 2022-06-01 DIAGNOSIS — E55.9 VITAMIN D DEFICIENCY: ICD-10-CM

## 2022-06-01 DIAGNOSIS — E66.9 OBESITY (BMI 30.0-34.9): ICD-10-CM

## 2022-06-01 DIAGNOSIS — Z95.1 S/P CABG X 3: ICD-10-CM

## 2022-06-01 DIAGNOSIS — I25.10 CORONARY ARTERIOSCLEROSIS IN NATIVE ARTERY: ICD-10-CM

## 2022-06-01 DIAGNOSIS — E78.2 MIXED HYPERLIPIDEMIA: ICD-10-CM

## 2022-06-01 DIAGNOSIS — N18.32 STAGE 3B CHRONIC KIDNEY DISEASE: Primary | ICD-10-CM

## 2022-06-01 DIAGNOSIS — I10 BENIGN ESSENTIAL HYPERTENSION: ICD-10-CM

## 2022-06-01 DIAGNOSIS — Z95.5 S/P CORONARY ARTERY STENT PLACEMENT: ICD-10-CM

## 2022-06-01 DIAGNOSIS — I11.9 BENIGN HYPERTENSIVE HEART DISEASE WITHOUT HEART FAILURE: ICD-10-CM

## 2022-06-01 PROCEDURE — 99215 PR OFFICE/OUTPT VISIT, EST, LEVL V, 40-54 MIN: ICD-10-PCS | Mod: S$PBB,,, | Performed by: INTERNAL MEDICINE

## 2022-06-01 PROCEDURE — 99999 PR PBB SHADOW E&M-EST. PATIENT-LVL IV: CPT | Mod: PBBFAC,,, | Performed by: INTERNAL MEDICINE

## 2022-06-01 PROCEDURE — 99999 PR PBB SHADOW E&M-EST. PATIENT-LVL IV: ICD-10-PCS | Mod: PBBFAC,,, | Performed by: INTERNAL MEDICINE

## 2022-06-01 PROCEDURE — 99214 OFFICE O/P EST MOD 30 MIN: CPT | Mod: PBBFAC,PO | Performed by: INTERNAL MEDICINE

## 2022-06-01 PROCEDURE — 99215 OFFICE O/P EST HI 40 MIN: CPT | Mod: S$PBB,,, | Performed by: INTERNAL MEDICINE

## 2022-06-01 RX ORDER — MIDODRINE HYDROCHLORIDE 2.5 MG/1
2.5 TABLET ORAL EVERY MORNING
COMMUNITY
Start: 2022-05-04 | End: 2023-11-15

## 2022-06-01 NOTE — PROGRESS NOTES
Subjective:       Patient ID: Shashi Hassan is a 89 y.o. White male who presents for follow-up evaluation of Chronic Kidney Disease    HPI   He is doing well except ongoping constipation  No CP nor SOB. He denies foamy urine, no hematuria and no flank pain. He follows a low sodium diet and feels he stays hydrated. No LE edema and no SOB. No NSAID use and no herbal medications.     Interval history Dec 2019: doing well. Finally has relief from constipation. LE is the same, no increase of SOB    Interval history July 2020: He is doing well. Constipation relieved with 'Aloe vera' product his cardiologist sells in his office. No LE edema. He is painting more due to pandemic. Mentally OK     Intrerval histoery Feb 2021: s/p stress test with Dr. Cliff Chase about one month agao--no results available to me and has not seen for follow up . He has received COVID X 2 vaccine. He is feeling overall well. Still painting for keeping busy.     Interval history Sept 2021:  Had his 89th birthday with family celebration. He is feeling well. 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 Has increased [place], saw Cards (Dr. Cliff Chase) and wants PPM for him as well as stress test.  Wife at home on O2 after a syncopal episode.     Review of Systems   Constitutional: Positive for fatigue. Negative for activity change, appetite change and unexpected weight change.   HENT: Negative for facial swelling.    Respiratory: Positive for shortness of breath. Negative for cough.    Cardiovascular: Negative for chest pain and leg swelling.   Gastrointestinal: Negative for nausea and vomiting.   Genitourinary: Negative for difficulty urinating.   Musculoskeletal: Positive for arthralgias.   Allergic/Immunologic: Positive for immunocompromised state.   Neurological: Negative for weakness.   Psychiatric/Behavioral: Negative for confusion and decreased concentration.       Objective:      Physical Exam  Vitals and  nursing note reviewed.   Constitutional:       General: He is not in acute distress.     Appearance: He is well-developed.   Eyes:      General: No scleral icterus.  Neck:      Vascular: No JVD.   Cardiovascular:      Heart sounds: S1 normal and S2 normal.     No friction rub.   Pulmonary:      Breath sounds: No wheezing or rales.   Abdominal:      Palpations: Abdomen is soft.   Skin:     General: Skin is warm and dry.   Neurological:      Mental Status: He is alert and oriented to person, place, and time.         Assessment:       1. Stage 3b chronic kidney disease    2. Vitamin D deficiency    3. Hypothyroidism, unspecified type    4. Coronary arteriosclerosis in native artery    5. S/P CABG x 3    6. Benign hypertensive heart disease without heart failure    7. Benign essential hypertension    8. Mixed hyperlipidemia    9. Obesity (BMI 30.0-34.9)    10. S/P coronary artery stent placement        Plan:             CKD Stage 3 with stable kidney function.  Continue RAAS blockade (sprinolactone and telmisartan) for renal preservation    BP is controlled    Mineral and Bone Disease--PTH and D level at goal. Calcium is stable, (off calcium months ago. Should not take in setting of CKD)    Screen for DM--negative 3/2021 (a1c 5.2)    Dyspnea--discussed that it is interfering with his QOL and ADLs thus he should discuss PPM etc further with Cards       RTC 6 mo   53 minutes of total time spent on the encounter, which includes face to face time and non-face to face time preparing to see the patient (eg, review of tests), Obtaining and/or reviewing separately obtained history, Documenting clinical information in the electronic or other health record, Independently interpreting results (not separately reported) and communicating results to the patient/family/caregiver, or Care coordination (not separately reported).

## 2022-06-16 ENCOUNTER — OFFICE VISIT (OUTPATIENT)
Dept: GASTROENTEROLOGY | Facility: CLINIC | Age: 87
End: 2022-06-16
Payer: MEDICARE

## 2022-06-16 VITALS — WEIGHT: 160.5 LBS | BODY MASS INDEX: 30.3 KG/M2 | HEIGHT: 61 IN

## 2022-06-16 DIAGNOSIS — R13.19 ESOPHAGEAL DYSPHAGIA: ICD-10-CM

## 2022-06-16 PROCEDURE — 99999 PR PBB SHADOW E&M-EST. PATIENT-LVL III: CPT | Mod: PBBFAC,,, | Performed by: INTERNAL MEDICINE

## 2022-06-16 PROCEDURE — 99213 OFFICE O/P EST LOW 20 MIN: CPT | Mod: PBBFAC,PO | Performed by: INTERNAL MEDICINE

## 2022-06-16 PROCEDURE — 99204 OFFICE O/P NEW MOD 45 MIN: CPT | Mod: S$PBB,,, | Performed by: INTERNAL MEDICINE

## 2022-06-16 PROCEDURE — 99204 PR OFFICE/OUTPT VISIT, NEW, LEVL IV, 45-59 MIN: ICD-10-PCS | Mod: S$PBB,,, | Performed by: INTERNAL MEDICINE

## 2022-06-16 PROCEDURE — 99999 PR PBB SHADOW E&M-EST. PATIENT-LVL III: ICD-10-PCS | Mod: PBBFAC,,, | Performed by: INTERNAL MEDICINE

## 2022-06-16 NOTE — PROGRESS NOTES
CC: dysphagia    HPI 89 y.o. male with history of CAD status post CABG and stents about 3 years ago who presents for evaluation of 3 months of pill dysphagia and choking with liquids. He states that the symptoms do not happen with solid foods. He is able to eat chicken and steak fine. He does report large pills have difficulty going down. He has never had an EGD prior. He is due for stress test and Cardiology has recommended pacemaker placement due to bradycardia history. He is very active otherwise.  Denies weight loss unintentionally.  Denies regurgitation of food.  Does endorse increased saliva production. No other complaints. No dizziness, lightheadedness. Medical records reviewed. Additional history supplemented by nursing.     Past Medical History:   Diagnosis Date    Benign essential hypertension     Benign hypertensive heart disease     Carotid atherosclerosis     Chronic kidney disease, stage 3     Dr. Persaud    CKD (chronic kidney disease), stage III     Coronary arteriosclerosis in native artery     Edema     Gastroesophageal reflux disease     Hypothyroid     Mixed hyperlipidemia     Obesity     Osteoarthritis of finger     Paroxysmal supraventricular tachycardia     Paroxysmal ventricular tachycardia     Right bundle branch block     S/P CABG x 3 2008    S/P coronary artery stent placement     2 in Jan,2017; 1 in April 2017; Dr Garnica placed; sees Dr Chase.    Shortness of breath     Ventricular tachycardia      Past Surgical History:   Procedure Laterality Date    BPH surgery  2006    COLONOSCOPY  07/2005    polyps few removed; benign; repeat in 10 yrs. Past due.    CORONARY ANGIOGRAPHY N/A 12/4/2018    Procedure: ANGIOGRAM, CORONARY ARTERY;  Surgeon: Darrell Garnica MD;  [facility] C[facility];  Service: Cardiology;  Laterality: N/A;    CORONARY ARTERY BYPASS GRAFT  2009    Done at [facility] in [place], [place]: FINA-LAD, SEQ SVG OM    coronary stent placements      2 in Jan,  "2017; 1 in april, 2017 by dr Garnica.     Social History  Social History     Tobacco Use    Smoking status: Never Smoker    Smokeless tobacco: Never Used   Substance Use Topics    Alcohol use: No    Drug use: No     Family History   Problem Relation Age of Onset    Colon cancer Mother     Diabetes Mellitus Mother     Hypertension Mother     Heart disease Mother     Heart attack Father     Stroke Father     Hypertension Father     Heart disease Father     Hypertension Brother      Review of Systems  General ROS: negative for chills, fever or weight loss  Psychological ROS: negative for hallucination, depression or suicidal ideation  Ophthalmic ROS: negative for blurry vision, photophobia or eye pain  ENT ROS: negative for epistaxis, sore throat or rhinorrhea  Respiratory ROS: no cough, shortness of breath, or wheezing  Cardiovascular ROS: no chest pain or dyspnea on exertion  Gastrointestinal ROS: +pill dysphagia, no abdominal pain, change in bowel habits, or black/ bloody stools  Genito-Urinary ROS: no dysuria, trouble voiding, or hematuria  Musculoskeletal ROS: negative for gait disturbance or muscular weakness  Neurological ROS: no syncope or seizures; no ataxia  Dermatological ROS: negative for pruritis, rash and jaundice    Physical Examination  Ht 5' 1" (1.549 m)   Wt 72.8 kg (160 lb 7.9 oz)   BMI 30.33 kg/m²   General appearance: alert, cooperative, no distress  HENT: Normocephalic, atraumatic, neck symmetrical, no nasal discharge   Eyes: conjunctivae/corneas clear, PERRL, EOM's intact  Lungs: clear to auscultation bilaterally, symmetric chest wall expansion bilaterally  Heart: regular rate and rhythm without rub; no displacement of the PMI   Abdomen: soft, non-tender; bowel sounds normoactive; no organomegaly  Extremities: extremities symmetric; no clubbing, cyanosis, or edema  Integument: Skin color, texture, turgor normal; no rashes; hair distrubution normal  Neurologic: Alert and oriented X 3, " normal strength, normal coordination and gait  Psychiatric: no pressured speech; normal affect; no evidence of impaired cognition     Labs:  Lab Results   Component Value Date    WBC 6.52 04/06/2022    HGB 13.5 (L) 04/06/2022    HCT 42.3 04/06/2022    MCV 95 04/06/2022     04/06/2022     CMP  Sodium   Date Value Ref Range Status   04/06/2022 138 136 - 145 mmol/L Final     Potassium   Date Value Ref Range Status   04/06/2022 5.1 3.5 - 5.1 mmol/L Final     Chloride   Date Value Ref Range Status   04/06/2022 104 95 - 110 mmol/L Final     CO2   Date Value Ref Range Status   04/06/2022 27 23 - 29 mmol/L Final     Glucose   Date Value Ref Range Status   04/06/2022 94 70 - 110 mg/dL Final     BUN   Date Value Ref Range Status   04/06/2022 24 (H) 8 - 23 mg/dL Final     Creatinine   Date Value Ref Range Status   04/06/2022 1.7 (H) 0.5 - 1.4 mg/dL Final     Calcium   Date Value Ref Range Status   04/06/2022 9.5 8.7 - 10.5 mg/dL Final     Total Protein   Date Value Ref Range Status   03/08/2022 6.5 6.0 - 8.4 g/dL Final     Albumin   Date Value Ref Range Status   04/06/2022 3.8 3.5 - 5.2 g/dL Final     Total Bilirubin   Date Value Ref Range Status   03/08/2022 0.6 0.1 - 1.0 mg/dL Final     Comment:     For infants and newborns, interpretation of results should be based  on gestational age, weight and in agreement with clinical  observations.    Premature Infant recommended reference ranges:  Up to 24 hours.............<8.0 mg/dL  Up to 48 hours............<12.0 mg/dL  3-5 days..................<15.0 mg/dL  6-29 days.................<15.0 mg/dL       Alkaline Phosphatase   Date Value Ref Range Status   03/08/2022 57 55 - 135 U/L Final     AST   Date Value Ref Range Status   03/08/2022 22 10 - 40 U/L Final     ALT   Date Value Ref Range Status   03/08/2022 23 10 - 44 U/L Final     Anion Gap   Date Value Ref Range Status   04/06/2022 7 (L) 8 - 16 mmol/L Final     eGFR if    Date Value Ref Range Status    04/06/2022 40.2 (A) >60 mL/min/1.73 m^2 Final     eGFR if non    Date Value Ref Range Status   04/06/2022 34.7 (A) >60 mL/min/1.73 m^2 Final     Comment:     Calculation used to obtain the estimated glomerular filtration  rate (eGFR) is the CKD-EPI equation.          Imaging:  US Retroperitoneal kidney:  IMPRESSION: No echogenic calculus or hydronephrosis appreciated.     Independently reviewed    Assessment:   1. Pill dysphagia    Plan:   -Patient will see cardiologist in regards to stress test and pacemaker placement  -He will then have EGD for evaluation of pill dysphagia  -Will need to be off Plavix for 5 days prior to procedure with planned dilation  -Discussed risks, benefits with patient and wife in room    Emmanuel Antoine MD  [facility]  [address] [place] [address]  Office: [phone]  Fax: [phone]

## 2022-07-15 ENCOUNTER — LAB VISIT (OUTPATIENT)
Dept: LAB | Facility: HOSPITAL | Age: 87
End: 2022-07-15
Attending: INTERNAL MEDICINE
Payer: MEDICARE

## 2022-07-15 DIAGNOSIS — E03.9 HYPOTHYROIDISM, UNSPECIFIED TYPE: ICD-10-CM

## 2022-07-15 DIAGNOSIS — Z95.1 S/P CABG X 3: ICD-10-CM

## 2022-07-15 DIAGNOSIS — I10 BENIGN ESSENTIAL HYPERTENSION: ICD-10-CM

## 2022-07-15 DIAGNOSIS — N18.32 STAGE 3B CHRONIC KIDNEY DISEASE: ICD-10-CM

## 2022-07-15 DIAGNOSIS — E87.5 HYPERKALEMIA: ICD-10-CM

## 2022-07-15 DIAGNOSIS — E78.2 MIXED HYPERLIPIDEMIA: ICD-10-CM

## 2022-07-15 LAB
ANION GAP SERPL CALC-SCNC: 8 MMOL/L (ref 8–16)
BASOPHILS # BLD AUTO: 0.03 K/UL (ref 0–0.2)
BASOPHILS NFR BLD: 0.5 % (ref 0–1.9)
BUN SERPL-MCNC: 21 MG/DL (ref 8–23)
CALCIUM SERPL-MCNC: 9.1 MG/DL (ref 8.7–10.5)
CHLORIDE SERPL-SCNC: 103 MMOL/L (ref 95–110)
CHOLEST SERPL-MCNC: 124 MG/DL (ref 120–199)
CHOLEST/HDLC SERPL: 3 {RATIO} (ref 2–5)
CO2 SERPL-SCNC: 29 MMOL/L (ref 23–29)
CREAT SERPL-MCNC: 1.5 MG/DL (ref 0.5–1.4)
DIFFERENTIAL METHOD: ABNORMAL
EOSINOPHIL # BLD AUTO: 0.1 K/UL (ref 0–0.5)
EOSINOPHIL NFR BLD: 1.9 % (ref 0–8)
ERYTHROCYTE [DISTWIDTH] IN BLOOD BY AUTOMATED COUNT: 12.1 % (ref 11.5–14.5)
EST. GFR  (AFRICAN AMERICAN): 46.7 ML/MIN/1.73 M^2
EST. GFR  (NON AFRICAN AMERICAN): 40.4 ML/MIN/1.73 M^2
GLUCOSE SERPL-MCNC: 97 MG/DL (ref 70–110)
HCT VFR BLD AUTO: 41.1 % (ref 40–54)
HDLC SERPL-MCNC: 42 MG/DL (ref 40–75)
HDLC SERPL: 33.9 % (ref 20–50)
HGB BLD-MCNC: 14.1 G/DL (ref 14–18)
IMM GRANULOCYTES # BLD AUTO: 0.03 K/UL (ref 0–0.04)
IMM GRANULOCYTES NFR BLD AUTO: 0.5 % (ref 0–0.5)
LDLC SERPL CALC-MCNC: 59.4 MG/DL (ref 63–159)
LYMPHOCYTES # BLD AUTO: 1.8 K/UL (ref 1–4.8)
LYMPHOCYTES NFR BLD: 27.9 % (ref 18–48)
MAGNESIUM SERPL-MCNC: 2 MG/DL (ref 1.6–2.6)
MCH RBC QN AUTO: 31.3 PG (ref 27–31)
MCHC RBC AUTO-ENTMCNC: 34.3 G/DL (ref 32–36)
MCV RBC AUTO: 91 FL (ref 82–98)
MONOCYTES # BLD AUTO: 0.5 K/UL (ref 0.3–1)
MONOCYTES NFR BLD: 8.4 % (ref 4–15)
NEUTROPHILS # BLD AUTO: 3.8 K/UL (ref 1.8–7.7)
NEUTROPHILS NFR BLD: 60.8 % (ref 38–73)
NONHDLC SERPL-MCNC: 82 MG/DL
NRBC BLD-RTO: 0 /100 WBC
PLATELET # BLD AUTO: 208 K/UL (ref 150–450)
PMV BLD AUTO: 10.6 FL (ref 9.2–12.9)
POTASSIUM SERPL-SCNC: 4.2 MMOL/L (ref 3.5–5.1)
RBC # BLD AUTO: 4.51 M/UL (ref 4.6–6.2)
SODIUM SERPL-SCNC: 140 MMOL/L (ref 136–145)
T4 FREE SERPL-MCNC: 0.99 NG/DL (ref 0.71–1.51)
TRIGL SERPL-MCNC: 113 MG/DL (ref 30–150)
TSH SERPL DL<=0.005 MIU/L-ACNC: 4.87 UIU/ML (ref 0.4–4)
WBC # BLD AUTO: 6.3 K/UL (ref 3.9–12.7)

## 2022-07-15 PROCEDURE — 36415 COLL VENOUS BLD VENIPUNCTURE: CPT | Mod: PO | Performed by: INTERNAL MEDICINE

## 2022-07-15 PROCEDURE — 84439 ASSAY OF FREE THYROXINE: CPT | Performed by: INTERNAL MEDICINE

## 2022-07-15 PROCEDURE — 83735 ASSAY OF MAGNESIUM: CPT | Performed by: INTERNAL MEDICINE

## 2022-07-15 PROCEDURE — 80048 BASIC METABOLIC PNL TOTAL CA: CPT | Performed by: INTERNAL MEDICINE

## 2022-07-15 PROCEDURE — 85025 COMPLETE CBC W/AUTO DIFF WBC: CPT | Performed by: INTERNAL MEDICINE

## 2022-07-15 PROCEDURE — 84443 ASSAY THYROID STIM HORMONE: CPT | Performed by: INTERNAL MEDICINE

## 2022-07-15 PROCEDURE — 80061 LIPID PANEL: CPT | Performed by: INTERNAL MEDICINE

## 2022-07-20 ENCOUNTER — TELEPHONE (OUTPATIENT)
Dept: FAMILY MEDICINE | Facility: CLINIC | Age: 87
End: 2022-07-20
Payer: MEDICARE

## 2022-07-20 NOTE — TELEPHONE ENCOUNTER
----- Message from Rosanna Dozier sent at 7/20/2022  2:55 PM CDT -----  Type:  Patient Returning Call    Who Called:  pt  Who Left Message for Patient:  unknown  Does the patient know what this is regarding?:  no  Best Call Back Number:  [phone] (home) [phone] (work)    Additional Information:  please advise--thank you

## 2022-07-20 NOTE — TELEPHONE ENCOUNTER
Spoke w/ pt. Needs to reschedule appt on 7/22/22. Offered several options. Pt is going to have to check w/ his wife, as he has several upcoming appts. He is going to call back to reschedule.

## 2022-08-07 DIAGNOSIS — E03.9 HYPOTHYROIDISM, UNSPECIFIED TYPE: ICD-10-CM

## 2022-08-07 DIAGNOSIS — K59.00 CONSTIPATION, UNSPECIFIED CONSTIPATION TYPE: ICD-10-CM

## 2022-08-10 ENCOUNTER — TELEPHONE (OUTPATIENT)
Dept: FAMILY MEDICINE | Facility: CLINIC | Age: 87
End: 2022-08-10
Payer: MEDICARE

## 2022-08-10 NOTE — TELEPHONE ENCOUNTER
----- Message from Edwige Ely sent at 8/10/2022  2:36 PM CDT -----  Contact: self  Type: Sooner Appointment Request        Caller is requesting a sooner appointment. Caller declined first available appointment listed below. Caller will not accept being placed on the waitlist and is requesting a message be sent to doctor.        Name of Caller: Patient  When is the first available appointment? 9/23/2022  Best Call Back Number:  [phone] (work)  Additional Information: Pt wife called today needs to get scheduled for f/u appt his blood work is done already Thanks.

## 2022-08-11 RX ORDER — LEVOTHYROXINE SODIUM 75 UG/1
TABLET ORAL
Qty: 45 TABLET | Refills: 3 | Status: SHIPPED | OUTPATIENT
Start: 2022-08-11

## 2022-08-15 ENCOUNTER — TELEPHONE (OUTPATIENT)
Dept: GASTROENTEROLOGY | Facility: CLINIC | Age: 87
End: 2022-08-15
Payer: MEDICARE

## 2022-08-15 NOTE — TELEPHONE ENCOUNTER
----- Message from Emmanuel Antoine MD sent at 8/13/2022  3:16 PM CDT -----  Repeat EGD with dilation in 2-4 weeks. Can add on.

## 2022-08-27 DIAGNOSIS — I10 BENIGN ESSENTIAL HYPERTENSION: ICD-10-CM

## 2022-08-27 NOTE — TELEPHONE ENCOUNTER
No new care gaps identified.  [facility] Embedded Care Gaps. Reference number: [id number]. 8/27/2022   5:51:03 AM JEWELT

## 2022-08-27 NOTE — TELEPHONE ENCOUNTER
Refill Routing Note   Medication(s) are not appropriate for processing by [facility] for the following reason(s):      - Required laboratory values are abnormal    [facility] action(s):  Defer          Medication reconciliation completed: No     Appointments  past 12m or future 3m with PCP    Date Provider   Last Visit   3/16/2022 Mitchell Mota MD   Next Visit   9/27/2022 Mitchell Mota MD   ED visits in past 90 days: 0        Note composed:2:35 PM 08/27/2022

## 2022-08-29 ENCOUNTER — TELEPHONE (OUTPATIENT)
Dept: GASTROENTEROLOGY | Facility: CLINIC | Age: 87
End: 2022-08-29
Payer: MEDICARE

## 2022-08-29 NOTE — TELEPHONE ENCOUNTER
----- Message from Geraldine Hong sent at 8/29/2022 11:22 AM CDT -----  Contact: Spouse/Amy [phone]  Patient stated that they missed a call from this office.    Please call and advise.    Thank You

## 2022-08-30 RX ORDER — TELMISARTAN 20 MG/1
20 TABLET ORAL DAILY
Qty: 90 TABLET | Refills: 1 | Status: SHIPPED | OUTPATIENT
Start: 2022-08-30 | End: 2023-03-29

## 2022-09-02 ENCOUNTER — TELEPHONE (OUTPATIENT)
Dept: GASTROENTEROLOGY | Facility: CLINIC | Age: 87
End: 2022-09-02
Payer: MEDICARE

## 2022-09-02 NOTE — TELEPHONE ENCOUNTER
----- Message from Mesha Rivera LPN sent at 8/30/2022  4:56 PM CDT -----    ----- Message -----  From: Bulmaro San  Sent: 8/30/2022   2:40 PM CDT  To: Arash THOMPSON Staff    Type:  Patient Returning Call    Who Called:  patient  Who Left Message for Patient:  Mesha  Does the patient know what this is regarding?:    Best Call Back Number:  [phone]   Additional Information:

## 2022-09-05 DIAGNOSIS — B96.81 HELICOBACTER PYLORI GASTRITIS: Primary | ICD-10-CM

## 2022-09-05 DIAGNOSIS — K29.70 HELICOBACTER PYLORI GASTRITIS: Primary | ICD-10-CM

## 2022-09-05 RX ORDER — BISMUTH SUBSALICYLATE 262 MG/1
1 TABLET ORAL 4 TIMES DAILY
Refills: 0 | COMMUNITY
Start: 2022-09-05 | End: 2022-09-19

## 2022-09-05 RX ORDER — METRONIDAZOLE 500 MG/1
500 TABLET ORAL 3 TIMES DAILY
Qty: 42 TABLET | Refills: 0 | Status: SHIPPED | OUTPATIENT
Start: 2022-09-05 | End: 2022-09-19

## 2022-09-05 RX ORDER — PANTOPRAZOLE SODIUM 20 MG/1
20 TABLET, DELAYED RELEASE ORAL
Qty: 28 TABLET | Refills: 0 | Status: SHIPPED | OUTPATIENT
Start: 2022-09-05 | End: 2022-09-19

## 2022-09-05 RX ORDER — TETRACYCLINE HYDROCHLORIDE 500 MG/1
500 CAPSULE ORAL 4 TIMES DAILY
Qty: 56 CAPSULE | Refills: 0 | Status: SHIPPED | OUTPATIENT
Start: 2022-09-05 | End: 2022-09-19

## 2022-09-06 ENCOUNTER — TELEPHONE (OUTPATIENT)
Dept: GASTROENTEROLOGY | Facility: CLINIC | Age: 87
End: 2022-09-06
Payer: MEDICARE

## 2022-09-06 NOTE — TELEPHONE ENCOUNTER
Called patient and a female answered she placed me on hold then the call was dropped.   Tried again about 15 minutes later, and had to leave a voicemail for patient to return call.

## 2022-09-06 NOTE — TELEPHONE ENCOUNTER
----- Message from Emmanuel Antoine MD sent at 9/5/2022  3:10 PM CDT -----  Needs repeat EGD with dilation scheduled

## 2022-09-06 NOTE — TELEPHONE ENCOUNTER
----- Message from Emmanuel Antoine MD sent at 9/5/2022  3:13 PM CDT -----  Please schedule with NP to discuss hpylori and repeat EGD

## 2022-09-13 ENCOUNTER — TELEPHONE (OUTPATIENT)
Dept: ADMINISTRATIVE | Facility: CLINIC | Age: 87
End: 2022-09-13
Payer: MEDICARE

## 2022-09-14 NOTE — TELEPHONE ENCOUNTER
Spoke with patient and scheduled him an appointment with MADHAVI Gould on 9/20 for 1 pm. To discuss the pathology results, h.pylori treatment, and encourage repeating the EGD.

## 2022-09-15 ENCOUNTER — OFFICE VISIT (OUTPATIENT)
Dept: FAMILY MEDICINE | Facility: CLINIC | Age: 87
End: 2022-09-15
Payer: MEDICARE

## 2022-09-15 VITALS
DIASTOLIC BLOOD PRESSURE: 62 MMHG | WEIGHT: 162.94 LBS | BODY MASS INDEX: 29.98 KG/M2 | SYSTOLIC BLOOD PRESSURE: 124 MMHG | OXYGEN SATURATION: 97 % | HEIGHT: 62 IN | HEART RATE: 51 BPM

## 2022-09-15 DIAGNOSIS — E78.2 MIXED HYPERLIPIDEMIA: ICD-10-CM

## 2022-09-15 DIAGNOSIS — N18.32 STAGE 3B CHRONIC KIDNEY DISEASE: ICD-10-CM

## 2022-09-15 DIAGNOSIS — I10 BENIGN ESSENTIAL HYPERTENSION: ICD-10-CM

## 2022-09-15 DIAGNOSIS — Z00.00 ENCOUNTER FOR PREVENTIVE HEALTH EXAMINATION: Primary | ICD-10-CM

## 2022-09-15 DIAGNOSIS — I47.20 VENTRICULAR TACHYCARDIA: ICD-10-CM

## 2022-09-15 PROCEDURE — 99215 OFFICE O/P EST HI 40 MIN: CPT | Mod: PBBFAC,PO | Performed by: NURSE PRACTITIONER

## 2022-09-15 PROCEDURE — G0439 PR MEDICARE ANNUAL WELLNESS SUBSEQUENT VISIT: ICD-10-PCS | Mod: ,,, | Performed by: NURSE PRACTITIONER

## 2022-09-15 PROCEDURE — 99999 PR PBB SHADOW E&M-EST. PATIENT-LVL V: ICD-10-PCS | Mod: PBBFAC,,, | Performed by: NURSE PRACTITIONER

## 2022-09-15 PROCEDURE — 99999 PR PBB SHADOW E&M-EST. PATIENT-LVL V: CPT | Mod: PBBFAC,,, | Performed by: NURSE PRACTITIONER

## 2022-09-15 PROCEDURE — G0439 PPPS, SUBSEQ VISIT: HCPCS | Mod: ,,, | Performed by: NURSE PRACTITIONER

## 2022-09-15 NOTE — PROGRESS NOTES
"  Shashi Hassan presented for a  Medicare AWV and comprehensive Health Risk Assessment today. The following components were reviewed and updated:    Medical history  Family History  Social history  Allergies and Current Medications  Health Risk Assessment  Health Maintenance  Care Team         ** See Completed Assessments for Annual Wellness Visit within the encounter summary.**         The following assessments were completed:  Living Situation  CAGE  Depression Screening  Timed Get Up and Go  Whisper Test  Cognitive Function Screening      Nutrition Screening  ADL Screening  PAQ Screening    Review for Opioid Screening: Patient does not have rx for Opioids.    Review for Substance Use Disorders: Patient does not use substance.      Vitals:    09/15/22 1122   BP: 124/62   BP Location: Left arm   Patient Position: Sitting   BP Method: Medium (Manual)   Pulse: (!) 51   SpO2: 97%   Weight: 73.9 kg (162 lb 14.7 oz)   Height: 5' 2" (1.575 m)     Body mass index is 29.8 kg/m².  Physical Exam  Constitutional:       General: He is not in acute distress.     Appearance: He is normal weight.   HENT:      Head: Normocephalic.   Cardiovascular:      Rate and Rhythm: Bradycardia present.   Pulmonary:      Effort: Pulmonary effort is normal. No respiratory distress.   Neurological:      General: No focal deficit present.   Psychiatric:         Mood and Affect: Mood normal.             Diagnoses and health risks identified today and associated recommendations/orders:    1. Encounter for preventive health examination  Reviewed health maintenance and provided recommendations   Recommend flu, tdap, shingrix and COVID vaccines     2. Stage 3b chronic kidney disease  Continue to monitor  Followed by Dr Persaud  Encourage adequate water intake.      3. Ventricular tachycardia  Continue to monitor  Followed by Dr Chase.      4. Mixed hyperlipidemia  Continue to monitor  Followed by Mitchell Mota MD .      5. Benign essential " hypertension  Continue to monitor  Followed by Mitchell Mota MD .        Provided Jack with a 5-10 year written screening schedule and personal prevention plan. Recommendations were developed using the USPSTF age appropriate recommendations. Education, counseling, and referrals were provided as needed. After Visit Summary printed and given to patient which includes a list of additional screenings\tests needed.    Follow up in one year    Swati Rosario NP    I offered to discuss advanced care planning, including how to pick a person who would make decisions for you if you were unable to make them for yourself, called a health care power of , and what kind of decisions you might make such as use of life sustaining treatments such as ventilators and tube feeding when faced with a life limiting illness recorded on a living will that they will need to know. (How you want to be cared for as you near the end of your natural life)     X Patient is interested in learning more about how to make advanced directives.  I provided them paperwork and offered to discuss this with them.

## 2022-09-15 NOTE — PATIENT INSTRUCTIONS
Counseling and Referral of Other Preventative  (Italic type indicates deductible and co-insurance are waived)    Patient Name: Shashi Hassan  Today's Date: 9/15/2022    Health Maintenance       Date Due Completion Date    Shingles Vaccine (1 of 2) Never done ---    TETANUS VACCINE 09/19/2015 9/19/2005    COVID-19 Vaccine (4 - Booster for Pfizer series) 02/12/2022 10/12/2021    Influenza Vaccine (1) 09/01/2022 10/12/2021    Lipid Panel 07/15/2023 7/15/2022    Aspirin/Antiplatelet Therapy 09/15/2023 9/15/2022        No orders of the defined types were placed in this encounter.      The following information is provided to all patients.  This information is to help you find resources for any of the problems found today that may be affecting your health:                Living healthy guide: www.[facility].[place].gov      Understanding Diabetes: www.diabetes.org      Eating healthy: www.cdc.gov/healthyweight      CDC home safety checklist: www.cdc.gov/steadi/patient.html      Agency on Aging: www.goea.[place].[facility]      Alcoholics anonymous (AA): www.aa.org      Physical Activity: www.tia.nih.gov/jl5ltww      Tobacco use: www.quitwithusla.org

## 2022-09-20 ENCOUNTER — TELEPHONE (OUTPATIENT)
Dept: GASTROENTEROLOGY | Facility: CLINIC | Age: 87
End: 2022-09-20

## 2022-09-20 ENCOUNTER — TELEPHONE (OUTPATIENT)
Dept: GASTROENTEROLOGY | Facility: CLINIC | Age: 87
End: 2022-09-20
Payer: MEDICARE

## 2022-09-20 ENCOUNTER — OFFICE VISIT (OUTPATIENT)
Dept: GASTROENTEROLOGY | Facility: CLINIC | Age: 87
End: 2022-09-20
Payer: MEDICARE

## 2022-09-20 VITALS — WEIGHT: 160.5 LBS | HEIGHT: 62 IN | BODY MASS INDEX: 29.53 KG/M2

## 2022-09-20 DIAGNOSIS — Z98.890 HISTORY OF ESOPHAGOGASTRODUODENOSCOPY (EGD): Primary | ICD-10-CM

## 2022-09-20 DIAGNOSIS — B96.81 HELICOBACTER PYLORI GASTRITIS: ICD-10-CM

## 2022-09-20 DIAGNOSIS — Z79.01 ANTICOAGULANT LONG-TERM USE: ICD-10-CM

## 2022-09-20 DIAGNOSIS — K21.00 GASTROESOPHAGEAL REFLUX DISEASE WITH ESOPHAGITIS WITHOUT HEMORRHAGE: ICD-10-CM

## 2022-09-20 DIAGNOSIS — K29.70 HELICOBACTER PYLORI GASTRITIS: ICD-10-CM

## 2022-09-20 DIAGNOSIS — K22.2 SCHATZKI'S RING: ICD-10-CM

## 2022-09-20 DIAGNOSIS — R13.10 PILL DYSPHAGIA: ICD-10-CM

## 2022-09-20 PROCEDURE — 99999 PR PBB SHADOW E&M-EST. PATIENT-LVL IV: CPT | Mod: PBBFAC,,, | Performed by: NURSE PRACTITIONER

## 2022-09-20 PROCEDURE — 99214 OFFICE O/P EST MOD 30 MIN: CPT | Mod: S$PBB,,, | Performed by: NURSE PRACTITIONER

## 2022-09-20 PROCEDURE — 99999 PR PBB SHADOW E&M-EST. PATIENT-LVL IV: ICD-10-PCS | Mod: PBBFAC,,, | Performed by: NURSE PRACTITIONER

## 2022-09-20 PROCEDURE — 99214 OFFICE O/P EST MOD 30 MIN: CPT | Mod: PBBFAC,PO | Performed by: NURSE PRACTITIONER

## 2022-09-20 PROCEDURE — 99214 PR OFFICE/OUTPT VISIT, EST, LEVL IV, 30-39 MIN: ICD-10-PCS | Mod: S$PBB,,, | Performed by: NURSE PRACTITIONER

## 2022-09-20 RX ORDER — ONDANSETRON 4 MG/1
4 TABLET, ORALLY DISINTEGRATING ORAL 3 TIMES DAILY PRN
Qty: 30 TABLET | Refills: 1 | Status: SHIPPED | OUTPATIENT
Start: 2022-09-20

## 2022-09-20 NOTE — PROGRESS NOTES
Subjective:       Patient ID: Shashi Hassan is a 89 y.o. male Body mass index is 29.35 kg/m².    Chief Complaint: Gastroesophageal Reflux (Has questions regarding medications prescribed (metronidazole, tetracycline, pantoprazole))    This patient is new to me.  Established patient of Dr. Antoine.     Patient reports he has not started the h pylori treatment yet because he had questions. Dr. Antoine wants patient to be scheduled for another EGD with dilation    Gastroesophageal Reflux  He complains of dysphagia (has recurred, EGD with dilation helped for a few weeks but pill dysphagia has recurred but not as severe; denies problems with food or liquids), globus sensation (frequent throat clearing) and heartburn (indigestion at nighttime). He reports no abdominal pain, no belching, no chest pain, no choking, no coughing, no hoarse voice, no nausea (denies nausea, but reports he is worried he will be nausated with taking h pylori treatment; patient is requesting anti-emetic medication to have on hand) or no sore throat. The problem has been rapidly improving. Pertinent negatives include no melena or weight loss (stable per patient report). He has tried a PPI (protonix 40 mg once daily, 14 day h pylori treatment prescribed by Dr. Antoine: tetracycline, flagyl, bismuth and prilosec 20 mg BID (has not started yet)) for the symptoms. Past procedures include an EGD.     Review of Systems   Constitutional:  Negative for appetite change, fever and weight loss (stable per patient report).   HENT:  Negative for hoarse voice, sore throat and trouble swallowing.    Respiratory:  Negative for cough and choking.    Cardiovascular:  Negative for chest pain.   Gastrointestinal:  Positive for dysphagia (has recurred, EGD with dilation helped for a few weeks but pill dysphagia has recurred but not as severe; denies problems with food or liquids) and heartburn (indigestion at nighttime). Negative for abdominal pain, anal  bleeding, blood in stool, constipation, diarrhea, melena, nausea (denies nausea, but reports he is worried he will be nausated with taking h pylori treatment; patient is requesting anti-emetic medication to have on hand), rectal pain and vomiting.       Past Medical History:   Diagnosis Date    Benign essential hypertension     Benign hypertensive heart disease     Carotid atherosclerosis     Chronic kidney disease, stage 3     Dr. Persaud    CKD (chronic kidney disease), stage III     Coronary arteriosclerosis in native artery     Edema     Gastroesophageal reflux disease     Hypothyroid     Mixed hyperlipidemia     Obesity     Osteoarthritis of finger     Paroxysmal supraventricular tachycardia     Paroxysmal ventricular tachycardia     Right bundle branch block     S/P CABG x 3 2008    S/P coronary artery stent placement     2 in Jan,2017; 1 in April 2017; Dr Garnica placed; sees Dr Chase.    Shortness of breath     Ventricular tachycardia      Past Surgical History:   Procedure Laterality Date    BPH surgery  2006    COLONOSCOPY  07/2005    polyps few removed; benign; repeat in 10 yrs. Past due.    CORONARY ANGIOGRAPHY N/A 12/04/2018    Procedure: ANGIOGRAM, CORONARY ARTERY;  Surgeon: Darrell Garnica MD;  Location: [facility] CATH;  Service: Cardiology;  Laterality: N/A;    CORONARY ARTERY BYPASS GRAFT  2009    Done at [facility] in [place], [place]: FINA-LAD, SEQ SVG OM    coronary stent placements      2 in Jan, 2017; 1 in april, 2017 by dr Garnica.    ESOPHAGEAL DILATION N/A 08/12/2022    Procedure: DILATION, ESOPHAGUS;  Surgeon: Emmanuel Antoine MD;  Location: [facility] ENDO;  Service: Endoscopy;  Laterality: N/A;    ESOPHAGOGASTRODUODENOSCOPY N/A 08/12/2022    Procedure: EGD (ESOPHAGOGASTRODUODENOSCOPY);  Surgeon: Emmanuel Antoine MD;  [facility];  Service: Endoscopy;  Laterality: N/A;     Family History   Problem Relation Age of Onset    Colon cancer Mother     Diabetes Mellitus Mother     Hypertension  Mother     Heart disease Mother     Heart attack Father     Stroke Father     Hypertension Father     Heart disease Father     Hypertension Brother     Crohn's disease Neg Hx     Esophageal cancer Neg Hx     Ulcerative colitis Neg Hx     Stomach cancer Neg Hx      Social History     Tobacco Use    Smoking status: Never    Smokeless tobacco: Never   Substance Use Topics    Alcohol use: No    Drug use: No     Wt Readings from Last 10 Encounters:   09/20/22 72.8 kg (160 lb 7.9 oz)   09/15/22 73.9 kg (162 lb 14.7 oz)   08/12/22 72.2 kg (159 lb 2.8 oz)   06/16/22 72.8 kg (160 lb 7.9 oz)   06/01/22 72 kg (158 lb 12.8 oz)   03/16/22 75.5 kg (166 lb 8.9 oz)   12/01/21 74.4 kg (164 lb 0.4 oz)   09/30/21 75.2 kg (165 lb 12.6 oz)   06/25/21 74.9 kg (165 lb 2 oz)   06/02/21 76.5 kg (168 lb 10.4 oz)     Lab Results   Component Value Date    WBC 6.30 07/15/2022    HGB 14.1 07/15/2022    HCT 41.1 07/15/2022    MCV 91 07/15/2022     07/15/2022     CMP  Sodium   Date Value Ref Range Status   07/15/2022 140 136 - 145 mmol/L Final     Potassium   Date Value Ref Range Status   07/15/2022 4.2 3.5 - 5.1 mmol/L Final     Chloride   Date Value Ref Range Status   07/15/2022 103 95 - 110 mmol/L Final     CO2   Date Value Ref Range Status   07/15/2022 29 23 - 29 mmol/L Final     Glucose   Date Value Ref Range Status   07/15/2022 97 70 - 110 mg/dL Final     BUN   Date Value Ref Range Status   07/15/2022 21 8 - 23 mg/dL Final     Creatinine   Date Value Ref Range Status   07/15/2022 1.5 (H) 0.5 - 1.4 mg/dL Final     Calcium   Date Value Ref Range Status   07/15/2022 9.1 8.7 - 10.5 mg/dL Final     Total Protein   Date Value Ref Range Status   03/08/2022 6.5 6.0 - 8.4 g/dL Final     Albumin   Date Value Ref Range Status   04/06/2022 3.8 3.5 - 5.2 g/dL Final     Total Bilirubin   Date Value Ref Range Status   03/08/2022 0.6 0.1 - 1.0 mg/dL Final     Comment:     For infants and newborns, interpretation of results should be based  on  gestational age, weight and in agreement with clinical  observations.    Premature Infant recommended reference ranges:  Up to 24 hours.............<8.0 mg/dL  Up to 48 hours............<12.0 mg/dL  3-5 days..................<15.0 mg/dL  6-29 days.................<15.0 mg/dL       Alkaline Phosphatase   Date Value Ref Range Status   03/08/2022 57 55 - 135 U/L Final     AST   Date Value Ref Range Status   03/08/2022 22 10 - 40 U/L Final     ALT   Date Value Ref Range Status   03/08/2022 23 10 - 44 U/L Final     Anion Gap   Date Value Ref Range Status   07/15/2022 8 8 - 16 mmol/L Final     eGFR if    Date Value Ref Range Status   07/15/2022 46.7 (A) >60 mL/min/1.73 m^2 Final     eGFR if non    Date Value Ref Range Status   07/15/2022 40.4 (A) >60 mL/min/1.73 m^2 Final     Comment:     Calculation used to obtain the estimated glomerular filtration  rate (eGFR) is the CKD-EPI equation.        Reviewed prior medical records including endoscopy history (see surgical history/procedures).    Objective:      Physical Exam  Vitals and nursing note reviewed.   Constitutional:       General: He is not in acute distress.     Appearance: Normal appearance. He is well-developed. He is not diaphoretic.   HENT:      Mouth/Throat:      Comments: Patient is wearing a face mask, which covers patient's mouth and nose, due to COVID 19 concerns.  Eyes:      General: No scleral icterus.     Conjunctiva/sclera: Conjunctivae normal.   Pulmonary:      Effort: Pulmonary effort is normal. No respiratory distress.      Breath sounds: Normal breath sounds. No wheezing.   Abdominal:      General: Bowel sounds are normal. There is no distension or abdominal bruit.      Palpations: Abdomen is soft. Abdomen is not rigid. There is no mass.      Tenderness: There is no abdominal tenderness. There is no guarding or rebound. Negative signs include Mendoza's sign and McBurney's sign.   Skin:     General: Skin is warm and  dry.      Coloration: Skin is not pale.      Findings: No erythema or rash.      Comments: Non-jaundiced   Neurological:      Mental Status: He is alert and oriented to person, place, and time.   Psychiatric:         Behavior: Behavior normal.         Thought Content: Thought content normal.         Judgment: Judgment normal.       Assessment:       1. History of esophagogastroduodenoscopy (EGD)    2. Schatzki's ring    3. Pill dysphagia    4. Helicobacter pylori gastritis    5. Gastroesophageal reflux disease with esophagitis without hemorrhage    6. Anticoagulant long-term use        Plan:       History of esophagogastroduodenoscopy (EGD)  - reviewed results of EGD with patient; patient verbalized understanding    Schatzki's ring & Pill dysphagia  - schedule EGD, discussed procedure with patient and possible esophageal dilation may be performed during procedure if indicated, patient verbalized understanding  - educated patient to eat smaller more frequent meals and to eat slowly and advised to eat a soft diet.  - possible UGI/esophagram/esophageal manometry if symptoms persist    Helicobacter pylori gastritis  - discussed h pylori treatment in detail and how to take it as prescribed by Dr. Hay. Printed out and gave the patient Dr. Hay's recommendations from pathology report.  - START H PYLORI TREATMENT AS PRESCRIBED BY DR. HAY (Bismuth, Tetracycline, Flagyl, & pantoprazole 20 mg BID)  -     H. pylori antigen, stool; Future; Expected date: 12/20/2022; discussed with patient can confirm eradication with stool study or during repeat EGD    Gastroesophageal reflux disease with esophagitis without hemorrhage  -     ondansetron (ZOFRAN-ODT) 4 MG TbDL; Take 1 tablet (4 mg total) by mouth 3 (three) times daily as needed (nausea).  Dispense: 30 tablet; Refill: 1    Anticoagulant long-term use  - informed patient that the anticoagulant(s) will likely need to be held for endoscopy, nurse will confirm  with endoscopist, cardiologist, and/or PCP.    Follow up in about 1 month (around 10/20/2022), or if symptoms worsen or fail to improve.      If no improvement in symptoms or symptoms worsen, call/follow-up at clinic or go to ER.        43 minutes of total time spent on the encounter, which includes face to face time and non-face to face time preparing to see the patient (e.g., review of tests), Obtaining and/or reviewing separately obtained history, Documenting clinical information in the electronic or other health record, Independently interpreting results (not separately reported) and communicating results to the patient/family/caregiver, or Care coordination (not separately reported).

## 2022-09-20 NOTE — TELEPHONE ENCOUNTER
"Patient came to the clinic in the afternoon hours after his appointment from 9:30 has passed. Patient was confused on why he was scheduled with Dr. Red and not [facility] for an EGD in December. Informed patient that Veerisetty will be on leave around this time and will not be able to perform the procedure. Pt verbalized understanding of doctor changes. Patient was confused on why his procedure was not at [facility] [place] due to his "heart concerns" and I informed patient he can have it there if he would like. Pt then wanted sooner appointment for EGD but informed him that in order to retest for the H pylori we needed to wait the 12 weeks and that is why the procedure is further out. Pt verbalized understanding and stated he is okay with it being at [facility] with Dr. Red for the month of December. Pt left the clinic.   "

## 2022-09-20 NOTE — TELEPHONE ENCOUNTER
Patient has egd scheduled on 12/8 and needs clearance to hold Plavix for 5 days prior to procedure. Please advise.     Kindly,     NEHEMIAH Agarwal

## 2022-09-20 NOTE — PATIENT INSTRUCTIONS
Gastritis (Adult)    Gastritis is inflammation and irritation of the stomach lining. It can be present for a short time (acute) or be long lasting (chronic). Gastritis is often caused by infection with bacteria called H pylori. More than a third of people in the [place] have this bacteria in their bodies. In many cases, H pylori causes no problems or symptoms. In some people, though, the infection irritates the stomach lining and causes gastritis. Other causes of stomach irritation include drinking alcohol or taking pain-relieving medicines called NSAIDs (such as aspirin or ibuprofen).   Symptoms of gastritis can include:  Abdominal pain or bloating  Loss of appetite  Nausea or vomiting  Vomiting blood or having black stools  Feeling more tired than usual  An inflamed and irritated stomach lining is more likely to develop a sore called an ulcer. To help prevent this, gastritis should be treated.  Home care  If needed, medicines may be prescribed. If you have H pylori infection, treating it will likely relieve your symptoms. Other changes can help reduce stomach irritation and help it heal.  If you have been prescribed medicines for H pylori infection, take them as directed. Take all of the medicine until it is finished or your healthcare provider tells you to stop, even if you feel better.  Your healthcare provider may recommend avoiding NSAIDs. If you take daily aspirin for your heart or other medical reasons, do not stop without talking to your healthcare provider first.  Avoid drinking alcohol.  Stop smoking. Smoking can irritate the stomach and delay healing. As much as possible, stay away from second hand smoke.  Follow-up care  Follow up with your healthcare provider, or as advised by our staff. Testing may be needed to check for inflammation or an ulcer.  When to seek medical advice  Call your healthcare provider for any of the following:  Stomach pain that gets worse or moves to the lower right abdomen (appendix  area)  Chest pain that appears or gets worse, or spreads to the back, neck, shoulder, or arm  Frequent vomiting (cant keep down liquids)  Blood in the stool or vomit (red or black in color)  Feeling weak or dizzy  Fever of 100.4ºF (38ºC) or higher, or as directed by your healthcare provider  Date Last Reviewed: 6/22/2015  © 4911-3890 [employer]. [address] [place] [address], [place] [address]. All rights reserved. This information is not intended as a substitute for professional medical care. Always follow your healthcare professional's instructions.      GERD (Adult)    The esophagus is a tube that carries food from the mouth to the stomach. A valve at the lower end of the esophagus prevents stomach acid from flowing upward. When this valve doesn't work properly, stomach contents may repeatedly flow back up (reflux) into the esophagus. This is called gastroesophageal reflux disease (GERD). GERD can irritate the esophagus. It can cause problems with swallowing or breathing. In severe cases, GERD can cause recurrent pneumonia or other serious problems.  Symptoms of reflux include burning, pressure or sharp pain in the upper abdomen or mid to lower chest. The pain can spread to the neck, back, or shoulder. There may be belching, an acid taste in the back of the throat, chronic cough, or sore throat or hoarseness. GERD symptoms often occur during the day after a big meal. They can also occur at night when lying down.   Home care  Lifestyle changes can help reduce symptoms. If needed, medicines may be prescribed. Symptoms often improve with treatment, but if treatment is stopped, the symptoms often return after a few months. So most persons with GERD will need to continue treatment.  Lifestyle changes  Limit or avoid fatty, fried, and spicy foods, as well as coffee, chocolate, mint, and foods with high acid content such as tomatoes and citrus fruit and juices (orange, grapefruit, lemon).  Dont eat large  "meals, especially at night. Frequent, smaller meals are best. Do not lie down right after eating. And dont eat anything 3 hours before going to bed.  Avoid drinking alcohol and smoking. As much as possible, stay away from second hand smoke.  If you are overweight, losing weight will reduce symptoms.   Avoid wearing tight clothing around your stomach area.  If your symptoms occur during sleep, use a foam wedge to elevate your upper body (not just your head.) Or, place 4" blocks under the head of your bed.  Medicines  If needed, medicines can help relieve the symptoms of GERD and prevent damage to the esophagus. Discuss a medicine plan with your healthcare provider. This may include one or more of the following medicines:  Antacids to help neutralize the normal acids in your stomach.  Acid blockers (H2 blockers) to decrease acid production.  Acid inhibitors (PPIs) to decrease acid production in a different way than the blockers. They may work better, but can take a little longer to take effect.  Take an antacid 30-60 minutes after eating and at bedtime, but not at the same time as an acid blocker.  Try not to take medicines such as ibuprofen and aspirin. If you are taking aspirin for your heart or other medical reasons, talk to your healthcare provider about stopping it.  Follow-up care  Follow up with your healthcare provider or as advised by our staff.  When to seek medical advice  Call your healthcare provider if any of the following occur:  Stomach pain gets worse or moves to the lower right abdomen (appendix area)  Chest pain appears or gets worse, or spreads to the back, neck, shoulder, or arm  Frequent vomiting (cant keep down liquids)  Blood in the stool or vomit (red or black in color)  Feeling weak or dizzy  Fever of 100.4ºF (38ºC) or higher, or as directed by your healthcare provider  Date Last Reviewed: 6/23/2015  © 1684-7124 [employer]. [address] [place] [address], [place] [address]. All " rights reserved. This information is not intended as a substitute for professional medical care. Always follow your healthcare professional's instructions.

## 2022-09-21 NOTE — TELEPHONE ENCOUNTER
Please call patient and tell him I would like him to check with his cardiologist Dr. Cliff Chase regarding if he can hold his Plavix for 5 days prior to his EGD.  Time of reviewed his lab results and there are no significant abnormalities but I would like to discuss his thyroid regiment with him.  He has an appointment coming up with me on 09/27/2022 so ask him to please keep that appointment

## 2022-09-26 NOTE — TELEPHONE ENCOUNTER
No answer. Left VM. Pt scheduled for appt with Dr. Mota on 9/27/22. Further to discussed at that time.

## 2022-10-03 ENCOUNTER — LAB VISIT (OUTPATIENT)
Dept: LAB | Facility: HOSPITAL | Age: 87
End: 2022-10-03
Payer: MEDICARE

## 2022-10-03 DIAGNOSIS — B96.81 HELICOBACTER PYLORI GASTRITIS: ICD-10-CM

## 2022-10-03 DIAGNOSIS — K29.70 HELICOBACTER PYLORI GASTRITIS: ICD-10-CM

## 2022-10-03 PROCEDURE — 87338 HPYLORI STOOL AG IA: CPT | Performed by: NURSE PRACTITIONER

## 2022-10-11 ENCOUNTER — TELEPHONE (OUTPATIENT)
Dept: GASTROENTEROLOGY | Facility: CLINIC | Age: 87
End: 2022-10-11
Payer: MEDICARE

## 2022-10-11 DIAGNOSIS — A04.8 H. PYLORI INFECTION: Primary | ICD-10-CM

## 2022-10-11 LAB
H PYLORI AG STL QL IA: DETECTED
SPECIMEN SOURCE: ABNORMAL

## 2022-10-11 RX ORDER — LEVOFLOXACIN 500 MG/1
500 TABLET, FILM COATED ORAL DAILY
Qty: 14 TABLET | Refills: 0 | Status: SHIPPED | OUTPATIENT
Start: 2022-10-11 | End: 2022-10-11 | Stop reason: CLARIF

## 2022-10-11 RX ORDER — PANTOPRAZOLE SODIUM 40 MG/1
40 TABLET, DELAYED RELEASE ORAL 2 TIMES DAILY
Qty: 28 TABLET | Refills: 0 | Status: SHIPPED | OUTPATIENT
Start: 2022-10-11 | End: 2022-10-11 | Stop reason: CLARIF

## 2022-10-11 RX ORDER — AMOXICILLIN 500 MG/1
1000 CAPSULE ORAL EVERY 12 HOURS
Qty: 56 CAPSULE | Refills: 0 | Status: SHIPPED | OUTPATIENT
Start: 2022-10-11 | End: 2022-10-11 | Stop reason: CLARIF

## 2022-10-11 NOTE — TELEPHONE ENCOUNTER
Please call to inform & review the results with the patient- stool study showed positive for h pylori bacteria. This needs to be treated with a course of antibiotics. Please verify that patient completed the previous treatment course that Dr. Antoine had prescribed (bismuth, tetracycline, and flagyl).  I sent in a new course of treatment for it (amoxicillin, levofloxacin, and pantoprazole BID dosing while on antibiotics). Continue with EGD as scheduled for 12/8/2022.    Continue with previous recommendations. If no improvement in symptoms or symptoms worsen, call/follow-up at clinic or go to ER.    Thanks,

## 2022-10-11 NOTE — TELEPHONE ENCOUNTER
Called patient and spoke with wife, she stated patient just completed course of antibiotics prescribed by Dr. Antoine today. Patient turned in stool sample too soon. Informed patient we can retest stool when patient has  EGD on 12/8. Pts wife verbalized understanding. Message routed back to Noa Esposito.

## 2022-10-11 NOTE — TELEPHONE ENCOUNTER
"Patient retested too soon. From our medical resources (Up-to-Date) "Tests to confirm eradication should be performed in all patients treated for H. pylori. Eradication may be confirmed by a urea breath test, fecal antigen test, or upper endoscopy performed four weeks or more after completion of antibiotic therapy". Patient is scheduled for EGD on 12/8/2022 and Dr. Red can biopsy his stomach to recheck for it during the EGD. Another stool sample is not needed. And the antibiotic course that I sent in today was canceled (staff called the pharmacy to cancel it).  KTP  "

## 2022-10-27 ENCOUNTER — TELEPHONE (OUTPATIENT)
Dept: NEPHROLOGY | Facility: CLINIC | Age: 87
End: 2022-10-27
Payer: MEDICARE

## 2022-10-27 NOTE — TELEPHONE ENCOUNTER
----- Message from Chloé Sean sent at 10/27/2022  3:40 PM CDT -----  Contact: self  Type: Needs Medical Advice    Who Called:  patient  Symptoms (please be specific):  lab orders    Best Call Back Number: [phone]   Additional Information: The pt stated that he would like to speak with someone in the office. The pt stated that he would like to get blood work done before he's appt on 11/9. Please call pt back once the orders have been put in. Thanks.

## 2022-11-07 ENCOUNTER — LAB VISIT (OUTPATIENT)
Dept: LAB | Facility: HOSPITAL | Age: 87
End: 2022-11-07
Attending: INTERNAL MEDICINE
Payer: MEDICARE

## 2022-11-07 DIAGNOSIS — N18.32 STAGE 3B CHRONIC KIDNEY DISEASE: ICD-10-CM

## 2022-11-07 LAB
ALBUMIN SERPL BCP-MCNC: 3.3 G/DL (ref 3.5–5.2)
ANION GAP SERPL CALC-SCNC: 8 MMOL/L (ref 8–16)
BASOPHILS # BLD AUTO: 0.02 K/UL (ref 0–0.2)
BASOPHILS NFR BLD: 0.3 % (ref 0–1.9)
BUN SERPL-MCNC: 19 MG/DL (ref 10–30)
CALCIUM SERPL-MCNC: 9 MG/DL (ref 8.7–10.5)
CHLORIDE SERPL-SCNC: 108 MMOL/L (ref 95–110)
CO2 SERPL-SCNC: 26 MMOL/L (ref 23–29)
CREAT SERPL-MCNC: 1.6 MG/DL (ref 0.5–1.4)
DIFFERENTIAL METHOD: ABNORMAL
EOSINOPHIL # BLD AUTO: 0.1 K/UL (ref 0–0.5)
EOSINOPHIL NFR BLD: 1.4 % (ref 0–8)
ERYTHROCYTE [DISTWIDTH] IN BLOOD BY AUTOMATED COUNT: 13.2 % (ref 11.5–14.5)
EST. GFR  (NO RACE VARIABLE): 40.4 ML/MIN/1.73 M^2
GLUCOSE SERPL-MCNC: 98 MG/DL (ref 70–110)
HCT VFR BLD AUTO: 39.6 % (ref 40–54)
HGB BLD-MCNC: 13 G/DL (ref 14–18)
IMM GRANULOCYTES # BLD AUTO: 0.03 K/UL (ref 0–0.04)
IMM GRANULOCYTES NFR BLD AUTO: 0.5 % (ref 0–0.5)
LYMPHOCYTES # BLD AUTO: 1.6 K/UL (ref 1–4.8)
LYMPHOCYTES NFR BLD: 25.4 % (ref 18–48)
MCH RBC QN AUTO: 30.9 PG (ref 27–31)
MCHC RBC AUTO-ENTMCNC: 32.8 G/DL (ref 32–36)
MCV RBC AUTO: 94 FL (ref 82–98)
MONOCYTES # BLD AUTO: 0.5 K/UL (ref 0.3–1)
MONOCYTES NFR BLD: 7.5 % (ref 4–15)
NEUTROPHILS # BLD AUTO: 4.1 K/UL (ref 1.8–7.7)
NEUTROPHILS NFR BLD: 64.9 % (ref 38–73)
NRBC BLD-RTO: 0 /100 WBC
PHOSPHATE SERPL-MCNC: 3.4 MG/DL (ref 2.7–4.5)
PLATELET # BLD AUTO: 205 K/UL (ref 150–450)
PMV BLD AUTO: 10.7 FL (ref 9.2–12.9)
POTASSIUM SERPL-SCNC: 4.8 MMOL/L (ref 3.5–5.1)
PTH-INTACT SERPL-MCNC: 33.8 PG/ML (ref 9–77)
RBC # BLD AUTO: 4.21 M/UL (ref 4.6–6.2)
SODIUM SERPL-SCNC: 142 MMOL/L (ref 136–145)
WBC # BLD AUTO: 6.29 K/UL (ref 3.9–12.7)

## 2022-11-07 PROCEDURE — 80069 RENAL FUNCTION PANEL: CPT | Performed by: INTERNAL MEDICINE

## 2022-11-07 PROCEDURE — 83970 ASSAY OF PARATHORMONE: CPT | Performed by: INTERNAL MEDICINE

## 2022-11-07 PROCEDURE — 36415 COLL VENOUS BLD VENIPUNCTURE: CPT | Mod: PO | Performed by: INTERNAL MEDICINE

## 2022-11-07 PROCEDURE — 85025 COMPLETE CBC W/AUTO DIFF WBC: CPT | Performed by: INTERNAL MEDICINE

## 2022-11-09 ENCOUNTER — OFFICE VISIT (OUTPATIENT)
Dept: NEPHROLOGY | Facility: CLINIC | Age: 87
End: 2022-11-09
Payer: MEDICARE

## 2022-11-09 VITALS
DIASTOLIC BLOOD PRESSURE: 62 MMHG | OXYGEN SATURATION: 99 % | BODY MASS INDEX: 29.13 KG/M2 | WEIGHT: 158.31 LBS | HEART RATE: 47 BPM | HEIGHT: 62 IN | SYSTOLIC BLOOD PRESSURE: 122 MMHG

## 2022-11-09 DIAGNOSIS — E55.9 VITAMIN D DEFICIENCY: ICD-10-CM

## 2022-11-09 DIAGNOSIS — I10 BENIGN ESSENTIAL HYPERTENSION: ICD-10-CM

## 2022-11-09 DIAGNOSIS — N18.32 STAGE 3B CHRONIC KIDNEY DISEASE: Primary | ICD-10-CM

## 2022-11-09 DIAGNOSIS — I25.10 CORONARY ARTERIOSCLEROSIS IN NATIVE ARTERY: ICD-10-CM

## 2022-11-09 DIAGNOSIS — Z95.1 S/P CABG X 3: ICD-10-CM

## 2022-11-09 PROCEDURE — 99215 OFFICE O/P EST HI 40 MIN: CPT | Mod: S$PBB,,, | Performed by: INTERNAL MEDICINE

## 2022-11-09 PROCEDURE — 99999 PR PBB SHADOW E&M-EST. PATIENT-LVL IV: CPT | Mod: PBBFAC,,, | Performed by: INTERNAL MEDICINE

## 2022-11-09 PROCEDURE — 99215 PR OFFICE/OUTPT VISIT, EST, LEVL V, 40-54 MIN: ICD-10-PCS | Mod: S$PBB,,, | Performed by: INTERNAL MEDICINE

## 2022-11-09 PROCEDURE — 99999 PR PBB SHADOW E&M-EST. PATIENT-LVL IV: ICD-10-PCS | Mod: PBBFAC,,, | Performed by: INTERNAL MEDICINE

## 2022-11-09 PROCEDURE — 99214 OFFICE O/P EST MOD 30 MIN: CPT | Mod: PBBFAC,PO | Performed by: INTERNAL MEDICINE

## 2022-11-09 NOTE — PROGRESS NOTES
Subjective:       Patient ID: Shashi Hassan is a 89 y.o. White male who presents for follow-up evaluation of Chronic Kidney Disease    HPI   He is doing well except ongoping constipation  No CP nor SOB. He denies foamy urine, no hematuria and no flank pain. He follows a low sodium diet and feels he stays hydrated. No LE edema and no SOB. No NSAID use and no herbal medications.     Interval history Dec 2019: doing well. Finally has relief from constipation. LE is the same, no increase of SOB    Interval history July 2020: He is doing well. Constipation relieved with 'Aloe vera' product his cardiologist sells in his office. No LE edema. He is painting more due to pandemic. Mentally OK     Intrerval histoery Feb 2021: s/p stress test with Dr. Cliff Chase about one month agao--no results available to me and has not seen for follow up . He has received COVID X 2 vaccine. He is feeling overall well. Still painting for keeping busy.     Interval history Sept 2021:  Had his 89th birthday with family celebration. He is feeling well. 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 Has increased WESTFALL, saw Cards (Dr. Cliff Chase) and wants PPM for him as well as stress test.  Wife at home on O2 after a syncopal episode.     Nov 2022: Feeling well. S/p EGD with H.  pylori and required dilation of esophagus with symptoms, and will need another near future. Still painting     Review of Systems   Constitutional:  Negative for activity change, appetite change and unexpected weight change.   HENT:  Negative for facial swelling.    Respiratory:  Positive for shortness of breath (stable). Negative for cough.    Cardiovascular:  Negative for chest pain and leg swelling.   Gastrointestinal:  Negative for constipation, nausea and vomiting.   Genitourinary:  Negative for difficulty urinating.   Musculoskeletal:  Positive for arthralgias.   Allergic/Immunologic: Positive for immunocompromised state (due to  CKD).   Neurological:  Negative for weakness.   Psychiatric/Behavioral:  Negative for confusion and decreased concentration.      Objective:      Physical Exam  Vitals and nursing note reviewed.   Constitutional:       General: He is not in acute distress.     Appearance: Normal appearance. He is well-developed. He is not ill-appearing.   Eyes:      General: No scleral icterus.  Neck:      Vascular: No JVD.   Cardiovascular:      Rate and Rhythm: Bradycardia present.      Heart sounds: S1 normal and S2 normal.     No friction rub.   Pulmonary:      Breath sounds: No wheezing or rales.   Abdominal:      Palpations: Abdomen is soft.   Musculoskeletal:      Right lower leg: No edema.      Left lower leg: No edema.   Skin:     General: Skin is warm and dry.   Neurological:      Mental Status: He is alert and oriented to person, place, and time. Mental status is at baseline.   Psychiatric:         Mood and Affect: Mood normal.       Assessment:       1. Stage 3b chronic kidney disease    2. Vitamin D deficiency    3. Benign essential hypertension    4. Coronary arteriosclerosis in native artery    5. S/P CABG x 3          Plan:             CKD Stage 3 with stable kidney function.  Continue RAAS blockade (sprinolactone and telmisartan) for renal preservation    BP is controlled    Mineral and Bone Disease--PTH and D level at goal. Calcium is stable, (off calcium months ago. Should not take in setting of CKD)    Screen for DM--negative 3/2021 (a1c 5.2)    CAD/s/p CABG--continue CV meds      RTC 6 mo   50 minutes of total time spent on the encounter, which includes face to face time and non-face to face time preparing to see the patient (eg, review of tests), Obtaining and/or reviewing separately obtained history, Documenting clinical information in the electronic or other health record, Independently interpreting results (not separately reported) and communicating results to the patient/family/caregiver, or Care  coordination (not separately reported).

## 2022-11-09 NOTE — PATIENT INSTRUCTIONS
Kidney function is stable, still at Stage 3    Electrolytes are stable    Mild anemia is the same     All medications reviewed and are fine for kidneys

## 2022-11-17 ENCOUNTER — IMMUNIZATION (OUTPATIENT)
Dept: FAMILY MEDICINE | Facility: CLINIC | Age: 87
End: 2022-11-17
Payer: MEDICARE

## 2022-11-17 PROCEDURE — G0008 ADMIN INFLUENZA VIRUS VAC: HCPCS | Mod: PBBFAC,PO

## 2022-11-18 ENCOUNTER — IMMUNIZATION (OUTPATIENT)
Dept: FAMILY MEDICINE | Facility: CLINIC | Age: 87
End: 2022-11-18
Payer: MEDICARE

## 2022-11-18 DIAGNOSIS — Z23 NEED FOR VACCINATION: Primary | ICD-10-CM

## 2022-11-18 PROCEDURE — 0124A COVID-19, MRNA, LNP-S, BIVALENT BOOSTER, PF, 30 MCG/0.3 ML DOSE: CPT | Mod: PBBFAC | Performed by: FAMILY MEDICINE

## 2022-11-18 PROCEDURE — 91312 COVID-19, MRNA, LNP-S, BIVALENT BOOSTER, PF, 30 MCG/0.3 ML DOSE: CPT | Mod: PBBFAC,PO

## 2022-12-05 ENCOUNTER — TELEPHONE (OUTPATIENT)
Dept: GASTROENTEROLOGY | Facility: CLINIC | Age: 87
End: 2022-12-05
Payer: MEDICARE

## 2022-12-05 NOTE — TELEPHONE ENCOUNTER
----- Message from Jaswinder Benites sent at 12/5/2022  9:08 AM CST -----  Regarding: question about procedure  Type: Needs Medical Advice    Who Called:  Shashi    Symptoms (please be specific):  Cough / cold    How long has patient had these symptoms:  last week    Pharmacy name and phone #:    [facility]  [address]    Best Call Back Number: [phone]    Additional Information: pt has procedure on 12/8 wants to make sure can have procedure. Says cold / cough is almost gone. Please call to discuss.

## 2022-12-12 ENCOUNTER — TELEPHONE (OUTPATIENT)
Dept: GASTROENTEROLOGY | Facility: CLINIC | Age: 87
End: 2022-12-12
Payer: MEDICARE

## 2022-12-12 NOTE — TELEPHONE ENCOUNTER
Left message for pt that [facility] will call to assess how he is feeling prior to procedure on Wednesday. Number provided

## 2022-12-12 NOTE — TELEPHONE ENCOUNTER
----- Message from Any Kaplan sent at 12/12/2022  9:11 AM CST -----  Regarding: pt call back  Name of Who is Calling: GAIL STANFORD [[id number]]      What is the request in detail: Pt is requesting a call back regarding an upcoming procedure on 12/15/2022. Pt states he has a cold but he does not have a fever and would still like to have the procedure. Please advise!    Can the clinic reply by [facility]: NO        [facility] [phone]

## 2023-01-05 NOTE — TELEPHONE ENCOUNTER
Refill Routing Note   Medication(s) are not appropriate for processing by [facility] for the following reason(s):      - Required laboratory values are abnormal    [facility] action(s):  Defer Medication-related problems identified:   Requires labs  Requires appointment        Medication reconciliation completed: No     Appointments  past 12m or future 3m with PCP    Date Provider   Last Visit   3/16/2022 Mitchell Mota MD   Next Visit   Visit date not found Mitchell Mota MD   ED visits in past 90 days: 0        Note composed:9:17 AM 01/05/2023

## 2023-01-05 NOTE — TELEPHONE ENCOUNTER
Care Due:                  Date            Visit Type   Department     Provider  --------------------------------------------------------------------------------                                EP -                              PRIMARY      [facility]  Last Visit: 03-      CARE ([facility])   MEDICINE       Mitchell Mota  Next Visit: None Scheduled  None         None Found                                                            Last  Test          Frequency    Reason                     Performed    Due Date  --------------------------------------------------------------------------------    Office Visit  12 months..  atorvastatin, telmisartan  03- 03-    CMP.........  12 months..  atorvastatin.............  03- 03-    [facility] Embedded Care Gaps. Reference number: [id number]. 1/05/2023   2:30:15 AM CST

## 2023-01-10 RX ORDER — LIOTHYRONINE SODIUM 5 UG/1
5 TABLET ORAL DAILY
Qty: 90 TABLET | Refills: 3 | Status: SHIPPED | OUTPATIENT
Start: 2023-01-10 | End: 2023-11-16 | Stop reason: ALTCHOICE

## 2023-03-19 DIAGNOSIS — Z95.1 S/P CABG X 3: ICD-10-CM

## 2023-03-19 DIAGNOSIS — I11.9 BENIGN HYPERTENSIVE HEART DISEASE WITHOUT HEART FAILURE: ICD-10-CM

## 2023-03-19 DIAGNOSIS — Z95.5 S/P CORONARY ARTERY STENT PLACEMENT: ICD-10-CM

## 2023-03-19 NOTE — TELEPHONE ENCOUNTER
Care Due:                  Date            Visit Type   Department     Provider  --------------------------------------------------------------------------------                                EP -                              PRIMARY      [facility]  Last Visit: 03-      CARE ([facility])   MEDICINE       Mitchell Mota  Next Visit: None Scheduled  None         None Found                                                            Last  Test          Frequency    Reason                     Performed    Due Date  --------------------------------------------------------------------------------    Office Visit  12 months..  atorvastatin, telmisartan  03- 03-    CMP.........  12 months..  atorvastatin.............  03- 03-    [facility] Embedded Care Gaps. Reference number: [id number]. 3/19/2023   5:07:00 AM CDT

## 2023-03-19 NOTE — TELEPHONE ENCOUNTER
Refill Routing Note   Medication(s) are not appropriate for processing by [facility] for the following reason(s):       Required vitals abnormal    [facility] action(s):  Defer   Requires appointment : Yes   Requires labs : Yes         Appointments  past 12m or future 3m with PCP    Date Provider   Last Visit   3/16/2022 Mitchell Mota MD   Next Visit   Visit date not found Mitchell Mota MD   ED visits in past 90 days: 0        Note composed:3:33 PM 03/19/2023

## 2023-03-21 RX ORDER — METOPROLOL TARTRATE 25 MG/1
TABLET, FILM COATED ORAL
Qty: 90 TABLET | Refills: 1 | Status: SHIPPED | OUTPATIENT
Start: 2023-03-21 | End: 2023-10-25

## 2023-05-02 ENCOUNTER — TELEPHONE (OUTPATIENT)
Dept: NEPHROLOGY | Facility: CLINIC | Age: 88
End: 2023-05-02
Payer: MEDICARE

## 2023-05-02 NOTE — TELEPHONE ENCOUNTER
----- Message from Porsha Lopez sent at 5/2/2023  1:31 PM CDT -----  Regarding: Sooner Appointment Request  Contact: patient's wife at [phone]  Type:  Sooner Appointment Request    Caller is requesting a sooner appointment.      Name of Caller:  patient's wife  When is the first available appointment?  8/2/23  Symptoms:  6 month follow up  Best Call Back Number:  [phone]    Additional Information:  Please call and advise. Patient's numbers are high. Labs are scheduled for tomorrow. Thank you

## 2023-05-03 ENCOUNTER — LAB VISIT (OUTPATIENT)
Dept: LAB | Facility: HOSPITAL | Age: 88
End: 2023-05-03
Attending: INTERNAL MEDICINE
Payer: MEDICARE

## 2023-05-03 DIAGNOSIS — E55.9 VITAMIN D DEFICIENCY: ICD-10-CM

## 2023-05-03 DIAGNOSIS — N18.32 STAGE 3B CHRONIC KIDNEY DISEASE: ICD-10-CM

## 2023-05-03 LAB
25(OH)D3+25(OH)D2 SERPL-MCNC: 102 NG/ML (ref 30–96)
ALBUMIN SERPL BCP-MCNC: 3.4 G/DL (ref 3.5–5.2)
ANION GAP SERPL CALC-SCNC: 7 MMOL/L (ref 8–16)
BUN SERPL-MCNC: 23 MG/DL (ref 10–30)
CALCIUM SERPL-MCNC: 8.8 MG/DL (ref 8.7–10.5)
CHLORIDE SERPL-SCNC: 106 MMOL/L (ref 95–110)
CO2 SERPL-SCNC: 29 MMOL/L (ref 23–29)
CREAT SERPL-MCNC: 1.4 MG/DL (ref 0.5–1.4)
EST. GFR  (NO RACE VARIABLE): 47.4 ML/MIN/1.73 M^2
GLUCOSE SERPL-MCNC: 92 MG/DL (ref 70–110)
PHOSPHATE SERPL-MCNC: 3.1 MG/DL (ref 2.7–4.5)
POTASSIUM SERPL-SCNC: 4.4 MMOL/L (ref 3.5–5.1)
PTH-INTACT SERPL-MCNC: 36.2 PG/ML (ref 9–77)
SODIUM SERPL-SCNC: 142 MMOL/L (ref 136–145)

## 2023-05-03 PROCEDURE — 82306 VITAMIN D 25 HYDROXY: CPT | Performed by: INTERNAL MEDICINE

## 2023-05-03 PROCEDURE — 80069 RENAL FUNCTION PANEL: CPT | Performed by: INTERNAL MEDICINE

## 2023-05-03 PROCEDURE — 36415 COLL VENOUS BLD VENIPUNCTURE: CPT | Mod: PO | Performed by: INTERNAL MEDICINE

## 2023-05-03 PROCEDURE — 83970 ASSAY OF PARATHORMONE: CPT | Performed by: INTERNAL MEDICINE

## 2023-05-10 ENCOUNTER — OFFICE VISIT (OUTPATIENT)
Dept: NEPHROLOGY | Facility: CLINIC | Age: 88
End: 2023-05-10
Payer: MEDICARE

## 2023-05-10 VITALS
BODY MASS INDEX: 27.9 KG/M2 | SYSTOLIC BLOOD PRESSURE: 112 MMHG | WEIGHT: 151.63 LBS | DIASTOLIC BLOOD PRESSURE: 70 MMHG | HEIGHT: 62 IN | HEART RATE: 50 BPM | OXYGEN SATURATION: 96 %

## 2023-05-10 DIAGNOSIS — N18.32 STAGE 3B CHRONIC KIDNEY DISEASE: Primary | ICD-10-CM

## 2023-05-10 DIAGNOSIS — Z95.1 S/P CABG X 3: ICD-10-CM

## 2023-05-10 DIAGNOSIS — I10 BENIGN ESSENTIAL HYPERTENSION: ICD-10-CM

## 2023-05-10 DIAGNOSIS — E55.9 VITAMIN D DEFICIENCY: ICD-10-CM

## 2023-05-10 PROCEDURE — 99999 PR PBB SHADOW E&M-EST. PATIENT-LVL III: CPT | Mod: PBBFAC,,, | Performed by: INTERNAL MEDICINE

## 2023-05-10 PROCEDURE — 99215 PR OFFICE/OUTPT VISIT, EST, LEVL V, 40-54 MIN: ICD-10-PCS | Mod: S$PBB,,, | Performed by: INTERNAL MEDICINE

## 2023-05-10 PROCEDURE — 99213 OFFICE O/P EST LOW 20 MIN: CPT | Mod: PBBFAC,PO | Performed by: INTERNAL MEDICINE

## 2023-05-10 PROCEDURE — 99999 PR PBB SHADOW E&M-EST. PATIENT-LVL III: ICD-10-PCS | Mod: PBBFAC,,, | Performed by: INTERNAL MEDICINE

## 2023-05-10 PROCEDURE — 99215 OFFICE O/P EST HI 40 MIN: CPT | Mod: S$PBB,,, | Performed by: INTERNAL MEDICINE

## 2023-05-10 NOTE — PROGRESS NOTES
Subjective:       Patient ID: Shashi Hassan is a 89 y.o. White male who presents for follow-up evaluation of Chronic Kidney Disease      HPI   He is doing well except ongoping constipation  No CP nor SOB. He denies foamy urine, no hematuria and no flank pain. He follows a low sodium diet and feels he stays hydrated. No LE edema and no SOB. No NSAID use and no herbal medications.     Interval history Dec 2019: doing well. Finally has relief from constipation. LE is the same, no increase of SOB    Interval history July 2020: He is doing well. Constipation relieved with 'Aloe vera' product his cardiologist sells in his office. No LE edema. He is painting more due to pandemic. Mentally OK     Intrerval histoery Feb 2021: s/p stress test with Dr. Cliff Chase about one month agao--no results available to me and has not seen for follow up . He has received COVID X 2 vaccine. He is feeling overall well. Still painting for keeping busy.     Interval history Sept 2021:  Had his 89th birthday with family celebration. He is feeling well. 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 Has increased WESTFALL, saw Cards (Dr. Cliff Chase) and wants PPM for him as well as stress test.  Wife at home on O2 after a syncopal episode.     Nov 2022: Feeling well. S/p EGD with H.  pylori and required dilation of esophagus with symptoms, and will need another near future. Still painting     May 2023: Repeat Schatzki's ring on EGD, dilated again. Overall doing well. Discussion of PPM. He is still painting. The drive to [place] is becoming challenging     Review of Systems   Constitutional:  Negative for activity change, appetite change and unexpected weight change.   HENT:  Negative for facial swelling.    Respiratory:  Positive for shortness of breath (stable). Negative for cough.    Cardiovascular:  Negative for chest pain and leg swelling.   Gastrointestinal:  Negative for constipation, nausea and vomiting.    Genitourinary:  Negative for difficulty urinating.   Musculoskeletal:  Positive for arthralgias.   Allergic/Immunologic: Positive for immunocompromised state (due to CKD, age HTN).   Neurological:  Negative for weakness.   Psychiatric/Behavioral:  Negative for confusion and decreased concentration.      Objective:      Physical Exam  Vitals and nursing note reviewed.   Constitutional:       General: He is not in acute distress.     Appearance: Normal appearance. He is well-developed. He is not ill-appearing.   Eyes:      General: No scleral icterus.  Neck:      Vascular: No JVD.   Cardiovascular:      Rate and Rhythm: Bradycardia present.      Heart sounds: S1 normal and S2 normal.     No friction rub.   Pulmonary:      Breath sounds: No wheezing or rales.   Abdominal:      Palpations: Abdomen is soft.   Musculoskeletal:      Right lower leg: No edema.      Left lower leg: No edema.   Skin:     General: Skin is warm and dry.   Neurological:      Mental Status: He is alert and oriented to person, place, and time. Mental status is at baseline.   Psychiatric:         Mood and Affect: Mood normal.       Assessment:       1. Stage 3b chronic kidney disease    2. Vitamin D deficiency    3. Benign essential hypertension    4. S/P CABG x 3            Plan:             CKD Stage 3 with stable kidney function.  Continue RAAS blockade (telmisartan) for renal preservation    BP is controlled    Mineral and Bone Disease--PTH at goal. D level elevated. Change D3 to QOD    Screen for DM--negative 3/2021 (a1c 5.2)    CAD/s/p CABG--continue CV meds          RTC 6 mo in [place]   49 minutes of total time spent on the encounter, which includes face to face time and non-face to face time preparing to see the patient (eg, review of tests), Obtaining and/or reviewing separately obtained history, Documenting clinical information in the electronic or other health record, Independently interpreting results (not separately reported) and  communicating results to the patient/family/caregiver, or Care coordination (not separately reported).

## 2023-08-29 ENCOUNTER — TELEPHONE (OUTPATIENT)
Dept: FAMILY MEDICINE | Facility: CLINIC | Age: 88
End: 2023-08-29
Payer: MEDICARE

## 2023-08-29 NOTE — TELEPHONE ENCOUNTER
----- Message from Edwige Ely sent at 8/29/2023  2:09 PM CDT -----  Contact: self  Type: Sooner Appointment Request         Caller is requesting a sooner appointment. Caller declined first available appointment listed below. Caller will not accept being placed on the waitlist and is requesting a message be sent to doctor.         Name of Caller: patient   Best Call Back Number: [phone]   Additional Information: Pt states he wants to get scheduled for a NP appt to [facility] appt. Former pt of Dr. Mota. Thanks

## 2023-09-12 PROBLEM — R53.81 DEBILITY: Status: ACTIVE | Noted: 2023-09-12

## 2023-09-19 ENCOUNTER — OFFICE VISIT (OUTPATIENT)
Dept: FAMILY MEDICINE | Facility: CLINIC | Age: 88
End: 2023-09-19
Payer: MEDICARE

## 2023-09-19 VITALS
HEIGHT: 62 IN | HEART RATE: 70 BPM | SYSTOLIC BLOOD PRESSURE: 122 MMHG | WEIGHT: 144.38 LBS | BODY MASS INDEX: 26.57 KG/M2 | OXYGEN SATURATION: 98 % | DIASTOLIC BLOOD PRESSURE: 60 MMHG

## 2023-09-19 DIAGNOSIS — I10 BENIGN ESSENTIAL HYPERTENSION: ICD-10-CM

## 2023-09-19 DIAGNOSIS — E78.2 MIXED HYPERLIPIDEMIA: ICD-10-CM

## 2023-09-19 DIAGNOSIS — I47.20 VENTRICULAR TACHYCARDIA: ICD-10-CM

## 2023-09-19 DIAGNOSIS — Z00.00 ENCOUNTER FOR PREVENTIVE HEALTH EXAMINATION: Primary | ICD-10-CM

## 2023-09-19 DIAGNOSIS — I70.0 AORTIC ATHEROSCLEROSIS: ICD-10-CM

## 2023-09-19 DIAGNOSIS — N18.32 STAGE 3B CHRONIC KIDNEY DISEASE: ICD-10-CM

## 2023-09-19 PROCEDURE — 99999 PR PBB SHADOW E&M-EST. PATIENT-LVL V: CPT | Mod: PBBFAC,,, | Performed by: NURSE PRACTITIONER

## 2023-09-19 PROCEDURE — 99215 OFFICE O/P EST HI 40 MIN: CPT | Mod: PBBFAC,PO | Performed by: NURSE PRACTITIONER

## 2023-09-19 PROCEDURE — 99999 PR PBB SHADOW E&M-EST. PATIENT-LVL V: ICD-10-PCS | Mod: PBBFAC,,, | Performed by: NURSE PRACTITIONER

## 2023-09-19 PROCEDURE — G0439 PR MEDICARE ANNUAL WELLNESS SUBSEQUENT VISIT: ICD-10-PCS | Mod: ,,, | Performed by: NURSE PRACTITIONER

## 2023-09-19 PROCEDURE — G0439 PPPS, SUBSEQ VISIT: HCPCS | Mod: ,,, | Performed by: NURSE PRACTITIONER

## 2023-09-19 NOTE — PATIENT INSTRUCTIONS
Counseling and Referral of Other Preventative  (Italic type indicates deductible and co-insurance are waived)    Patient Name: Shashi Hassan  Today's Date: 9/19/2023    Health Maintenance       Date Due Completion Date    Shingles Vaccine (1 of 2) Never done ---    TETANUS VACCINE 09/19/2015 9/19/2005    COVID-19 Vaccine (5 - Pfizer series) 03/18/2023 11/18/2022    Lipid Panel 07/15/2023 7/15/2022    Influenza Vaccine (1) 09/01/2023 11/17/2022    Aspirin/Antiplatelet Therapy 06/12/2024 6/12/2023        No orders of the defined types were placed in this encounter.      The following information is provided to all patients.  This information is to help you find resources for any of the problems found today that may be affecting your health:                Living healthy guide: www.[facility].[place].gov      Understanding Diabetes: www.diabetes.org      Eating healthy: www.cdc.gov/healthyweight      CDC home safety checklist: www.cdc.gov/steadi/patient.html      Agency on Aging: www.goea.[place].gov      Alcoholics anonymous (AA): www.aa.org      Physical Activity: www.tia.nih.gov/bh6fgom      Tobacco use: www.quitwithusla.org

## 2023-09-19 NOTE — PROGRESS NOTES
"  Shashi Hassan presented for a  Medicare AWV and comprehensive Health Risk Assessment today. The following components were reviewed and updated:    Medical history  Family History  Social history  Allergies and Current Medications  Health Risk Assessment  Health Maintenance  Care Team         ** See Completed Assessments for Annual Wellness Visit within the encounter summary.**         The following assessments were completed:  Living Situation  CAGE  Depression Screening  Timed Get Up and Go  Whisper Test  Cognitive Function Screening      Nutrition Screening  ADL Screening  PAQ Screening    Review for Opioid Screening: Patient does not have rx for Opioids.    Review for Substance Use Disorders: Patient does not use substance.     Vitals:    09/19/23 0954   BP: 122/60   BP Location: Left arm   Patient Position: Sitting   BP Method: Medium (Manual)   Pulse: 70   SpO2: 98%   Weight: 65.5 kg (144 lb 6.4 oz)   Height: 5' 2" (1.575 m)     Body mass index is 26.41 kg/m².  Physical Exam  Vitals reviewed.   Constitutional:       General: He is not in acute distress.  HENT:      Head: Normocephalic.   Cardiovascular:      Rate and Rhythm: Normal rate.   Pulmonary:      Effort: Pulmonary effort is normal. No respiratory distress.   Skin:     General: Skin is warm.   Neurological:      General: No focal deficit present.      Mental Status: He is alert.   Psychiatric:         Mood and Affect: Mood normal.               Diagnoses and health risks identified today and associated recommendations/orders:    1. Encounter for preventive health examination  Reviewed health maintenance and provided recommendations   Recommend shingrix, tdap, and flu vaccines     2. Aortic atherosclerosis  Continue to monitor  Followed by Dr Chase  CXR 9/12/23.      3. Stage 3b chronic kidney disease  Continue to monitor  Followed by Dr craig.      4. Ventricular tachycardia  Continue to monitor  Followed by Dr Chase.      5. Mixed " hyperlipidemia  Continue to monitor  Followed by Mitchell Mota MD .      6. Benign essential hypertension  Continue to monitor  Followed by Mitchell Mota MD .        Provided Jack with a 5-10 year written screening schedule and personal prevention plan. Recommendations were developed using the USPSTF age appropriate recommendations. Education, counseling, and referrals were provided as needed. After Visit Summary printed and given to patient which includes a list of additional screenings\tests needed.    Follow up in one year    Swati Rosario NP    I offered to discuss advanced care planning, including how to pick a person who would make decisions for you if you were unable to make them for yourself, called a health care power of , and what kind of decisions you might make such as use of life sustaining treatments such as ventilators and tube feeding when faced with a life limiting illness recorded on a living will that they will need to know. (How you want to be cared for as you near the end of your natural life)     X Patient is interested in learning more about how to make advanced directives.  I provided them paperwork and offered to discuss this with them.

## 2023-10-05 ENCOUNTER — TELEPHONE (OUTPATIENT)
Dept: FAMILY MEDICINE | Facility: CLINIC | Age: 88
End: 2023-10-05
Payer: MEDICARE

## 2023-10-05 NOTE — TELEPHONE ENCOUNTER
----- Message from Melina Villalobos sent at 10/5/2023 10:45 AM CDT -----  Contact: Patient  Type:  Needs Medical Advice    Who Called: Patient daughter    Symptoms (please be specific): patient's Wife was just discharged/ awaiting home health to admit wife      Would the patient rather a call back or a response via [facility]? Call    Best Call Back Number: [phone] ( Rocio, daughter)    Additional Information: Please call to advise

## 2023-10-06 ENCOUNTER — TELEPHONE (OUTPATIENT)
Dept: FAMILY MEDICINE | Facility: CLINIC | Age: 88
End: 2023-10-06
Payer: MEDICARE

## 2023-10-06 NOTE — TELEPHONE ENCOUNTER
----- Message from Silva Deleon sent at 10/6/2023 12:07 PM CDT -----  Contact: Rocio [phone]  Type: Needs Medical Advice  Who Called:  Pts daughter     Best Call Back Number: [phone]    Additional Information: Rocio is requesting a sooner appt if possible for her mother and father. She stated they do not have to come on the same day. Rocio stated home health came out and there was a lot of confusion about medications pts are taking and not taking. Pls call back and advise

## 2023-10-25 DIAGNOSIS — I11.9 BENIGN HYPERTENSIVE HEART DISEASE WITHOUT HEART FAILURE: ICD-10-CM

## 2023-10-25 DIAGNOSIS — Z95.1 S/P CABG X 3: ICD-10-CM

## 2023-10-25 DIAGNOSIS — I10 BENIGN ESSENTIAL HYPERTENSION: ICD-10-CM

## 2023-10-25 DIAGNOSIS — Z95.5 S/P CORONARY ARTERY STENT PLACEMENT: ICD-10-CM

## 2023-10-25 RX ORDER — TELMISARTAN 20 MG/1
20 TABLET ORAL
Qty: 90 TABLET | Refills: 0 | Status: SHIPPED | OUTPATIENT
Start: 2023-10-25 | End: 2023-11-15

## 2023-10-25 RX ORDER — METOPROLOL TARTRATE 25 MG/1
TABLET, FILM COATED ORAL
Qty: 90 TABLET | Refills: 10 | Status: SHIPPED | OUTPATIENT
Start: 2023-10-25 | End: 2023-11-15

## 2023-10-25 NOTE — TELEPHONE ENCOUNTER
Please advise  LOV:9/19/23  Nxt Apt: 11/15/23  Last Fill:     Telmisartan:  3/29/23---90---1  Lopressor:  3/21/23----90----1

## 2023-10-26 ENCOUNTER — OFFICE VISIT (OUTPATIENT)
Dept: OPTOMETRY | Facility: CLINIC | Age: 88
End: 2023-10-26
Payer: MEDICARE

## 2023-10-26 DIAGNOSIS — H52.203 MYOPIA WITH ASTIGMATISM AND PRESBYOPIA, BILATERAL: ICD-10-CM

## 2023-10-26 DIAGNOSIS — H52.4 MYOPIA WITH ASTIGMATISM AND PRESBYOPIA, BILATERAL: ICD-10-CM

## 2023-10-26 DIAGNOSIS — H04.123 DRY EYE SYNDROME OF BILATERAL LACRIMAL GLANDS: Primary | ICD-10-CM

## 2023-10-26 DIAGNOSIS — Z96.1 PSEUDOPHAKIA OF BOTH EYES: ICD-10-CM

## 2023-10-26 DIAGNOSIS — H52.13 MYOPIA WITH ASTIGMATISM AND PRESBYOPIA, BILATERAL: ICD-10-CM

## 2023-10-26 PROCEDURE — 99213 OFFICE O/P EST LOW 20 MIN: CPT | Mod: PBBFAC,PO

## 2023-10-26 PROCEDURE — 99203 OFFICE O/P NEW LOW 30 MIN: CPT | Mod: S$PBB,,,

## 2023-10-26 PROCEDURE — 99203 PR OFFICE/OUTPT VISIT, NEW, LEVL III, 30-44 MIN: ICD-10-PCS | Mod: S$PBB,,,

## 2023-10-26 PROCEDURE — 92015 PR REFRACTION: ICD-10-PCS | Mod: ,,,

## 2023-10-26 PROCEDURE — 92015 DETERMINE REFRACTIVE STATE: CPT | Mod: ,,,

## 2023-10-26 PROCEDURE — 99999 PR PBB SHADOW E&M-EST. PATIENT-LVL III: CPT | Mod: PBBFAC,,,

## 2023-10-26 PROCEDURE — 99999 PR PBB SHADOW E&M-EST. PATIENT-LVL III: ICD-10-PCS | Mod: PBBFAC,,,

## 2023-10-26 NOTE — PROGRESS NOTES
HPI    Pt here for routine visit REESE 2yrs ago (@ Dr. Meza)    Pt complains of blurred near vision w specs. Pt would like updated spec   Rx. Pt denies flashes and floaters. Pt not using eye drops. Complains of   occasional eye pain OS>OD.    Sees Dr. Morales q6mo for macular degeneration, has appt next week. Pt   taking AREDS 2, denies injections.  Last edited by Paras Horton, OD on 10/26/2023  4:33 PM.            Assessment /Plan     For exam results, see Encounter Report.    Dry eye syndrome of bilateral lacrimal glands    Pseudophakia of both eyes    Myopia with astigmatism and presbyopia, bilateral      Mild SPK OU. Ed pt on nature/chronicity of dry eye and that dry eye likely contributing to intermittent pain OS>>OD. Gave pt OTC artificial tear recommendations to use BID-QID for relief. Ed pt to RTC if symptoms worsen or fail to resolve.   PCIOL OU. Stable. Monitor.  Discussed spectacle options with pt and released final spec rx. Ed pt on minimal change in rx and adaptation.    *Pt has macular degeneration and is followed every 6 months by Dr. Morales. Has appt next week. No dilation today, continue care as directed.*    RTC: continue care with Dr. Morales, optom for spec rx prn.

## 2023-10-31 ENCOUNTER — IMMUNIZATION (OUTPATIENT)
Dept: FAMILY MEDICINE | Facility: CLINIC | Age: 88
End: 2023-10-31
Payer: MEDICARE

## 2023-10-31 PROCEDURE — G0008 ADMIN INFLUENZA VIRUS VAC: HCPCS | Mod: PBBFAC,PO

## 2023-10-31 PROCEDURE — 99999PBSHW FLU VACCINE - QUADRIVALENT - ADJUVANTED: Mod: PBBFAC,,,

## 2023-10-31 PROCEDURE — 99999PBSHW FLU VACCINE - QUADRIVALENT - ADJUVANTED: ICD-10-PCS | Mod: PBBFAC,,,

## 2023-11-15 ENCOUNTER — LAB VISIT (OUTPATIENT)
Dept: LAB | Facility: HOSPITAL | Age: 88
End: 2023-11-15
Attending: STUDENT IN AN ORGANIZED HEALTH CARE EDUCATION/TRAINING PROGRAM
Payer: MEDICARE

## 2023-11-15 ENCOUNTER — OFFICE VISIT (OUTPATIENT)
Dept: FAMILY MEDICINE | Facility: CLINIC | Age: 88
End: 2023-11-15
Payer: MEDICARE

## 2023-11-15 VITALS
BODY MASS INDEX: 24.6 KG/M2 | HEART RATE: 58 BPM | WEIGHT: 134.5 LBS | DIASTOLIC BLOOD PRESSURE: 58 MMHG | SYSTOLIC BLOOD PRESSURE: 118 MMHG | OXYGEN SATURATION: 99 %

## 2023-11-15 DIAGNOSIS — I10 PRIMARY HYPERTENSION: ICD-10-CM

## 2023-11-15 DIAGNOSIS — E03.9 HYPOTHYROIDISM, UNSPECIFIED TYPE: ICD-10-CM

## 2023-11-15 DIAGNOSIS — I47.10 SUPRAVENTRICULAR TACHYCARDIA: ICD-10-CM

## 2023-11-15 DIAGNOSIS — M19.042 PRIMARY OSTEOARTHRITIS OF BOTH HANDS: ICD-10-CM

## 2023-11-15 DIAGNOSIS — E03.9 HYPOTHYROIDISM, UNSPECIFIED TYPE: Primary | ICD-10-CM

## 2023-11-15 DIAGNOSIS — I25.10 CORONARY ARTERY DISEASE INVOLVING NATIVE CORONARY ARTERY OF NATIVE HEART WITHOUT ANGINA PECTORIS: ICD-10-CM

## 2023-11-15 DIAGNOSIS — N18.31 STAGE 3A CHRONIC KIDNEY DISEASE: ICD-10-CM

## 2023-11-15 DIAGNOSIS — M19.041 PRIMARY OSTEOARTHRITIS OF BOTH HANDS: ICD-10-CM

## 2023-11-15 PROBLEM — R06.09 DYSPNEA ON EXERTION: Status: RESOLVED | Noted: 2021-12-11 | Resolved: 2023-11-15

## 2023-11-15 PROBLEM — E66.9 OBESITY (BMI 30.0-34.9): Status: RESOLVED | Noted: 2017-06-21 | Resolved: 2023-11-15

## 2023-11-15 PROBLEM — R94.39 ABNORMAL STRESS ECG: Status: RESOLVED | Noted: 2018-12-04 | Resolved: 2023-11-15

## 2023-11-15 PROBLEM — Z01.89 ENCOUNTER FOR LABORATORY TEST: Status: RESOLVED | Noted: 2021-12-11 | Resolved: 2023-11-15

## 2023-11-15 PROBLEM — E66.811 OBESITY (BMI 30.0-34.9): Status: RESOLVED | Noted: 2017-06-21 | Resolved: 2023-11-15

## 2023-11-15 PROBLEM — I11.9 BENIGN HYPERTENSIVE HEART DISEASE WITHOUT HEART FAILURE: Status: RESOLVED | Noted: 2017-09-22 | Resolved: 2023-11-15

## 2023-11-15 PROBLEM — Z95.5 STATUS POST CORONARY ARTERY STENT PLACEMENT: Status: RESOLVED | Noted: 2021-12-11 | Resolved: 2023-11-15

## 2023-11-15 LAB
ALBUMIN SERPL BCP-MCNC: 3.6 G/DL (ref 3.5–5.2)
ALP SERPL-CCNC: 62 U/L (ref 55–135)
ALT SERPL W/O P-5'-P-CCNC: 22 U/L (ref 10–44)
ANION GAP SERPL CALC-SCNC: 9 MMOL/L (ref 8–16)
AST SERPL-CCNC: 23 U/L (ref 10–40)
BILIRUB SERPL-MCNC: 0.5 MG/DL (ref 0.1–1)
BUN SERPL-MCNC: 22 MG/DL (ref 10–30)
CALCIUM SERPL-MCNC: 9.3 MG/DL (ref 8.7–10.5)
CHLORIDE SERPL-SCNC: 105 MMOL/L (ref 95–110)
CO2 SERPL-SCNC: 28 MMOL/L (ref 23–29)
CREAT SERPL-MCNC: 1.4 MG/DL (ref 0.5–1.4)
ERYTHROCYTE [DISTWIDTH] IN BLOOD BY AUTOMATED COUNT: 13 % (ref 11.5–14.5)
EST. GFR  (NO RACE VARIABLE): 47.2 ML/MIN/1.73 M^2
GLUCOSE SERPL-MCNC: 80 MG/DL (ref 70–110)
HCT VFR BLD AUTO: 39.6 % (ref 40–54)
HGB BLD-MCNC: 12.8 G/DL (ref 14–18)
MCH RBC QN AUTO: 30.5 PG (ref 27–31)
MCHC RBC AUTO-ENTMCNC: 32.3 G/DL (ref 32–36)
MCV RBC AUTO: 95 FL (ref 82–98)
PLATELET # BLD AUTO: 222 K/UL (ref 150–450)
PMV BLD AUTO: 10.1 FL (ref 9.2–12.9)
POTASSIUM SERPL-SCNC: 4.9 MMOL/L (ref 3.5–5.1)
PROT SERPL-MCNC: 6.4 G/DL (ref 6–8.4)
RBC # BLD AUTO: 4.19 M/UL (ref 4.6–6.2)
SODIUM SERPL-SCNC: 142 MMOL/L (ref 136–145)
TSH SERPL DL<=0.005 MIU/L-ACNC: 1.26 UIU/ML (ref 0.4–4)
WBC # BLD AUTO: 6.37 K/UL (ref 3.9–12.7)

## 2023-11-15 PROCEDURE — 99215 PR OFFICE/OUTPT VISIT, EST, LEVL V, 40-54 MIN: ICD-10-PCS | Mod: S$PBB,,, | Performed by: STUDENT IN AN ORGANIZED HEALTH CARE EDUCATION/TRAINING PROGRAM

## 2023-11-15 PROCEDURE — 80053 COMPREHEN METABOLIC PANEL: CPT | Performed by: STUDENT IN AN ORGANIZED HEALTH CARE EDUCATION/TRAINING PROGRAM

## 2023-11-15 PROCEDURE — 85027 COMPLETE CBC AUTOMATED: CPT | Performed by: STUDENT IN AN ORGANIZED HEALTH CARE EDUCATION/TRAINING PROGRAM

## 2023-11-15 PROCEDURE — 36415 COLL VENOUS BLD VENIPUNCTURE: CPT | Mod: PO | Performed by: STUDENT IN AN ORGANIZED HEALTH CARE EDUCATION/TRAINING PROGRAM

## 2023-11-15 PROCEDURE — 99214 OFFICE O/P EST MOD 30 MIN: CPT | Mod: PBBFAC,PO | Performed by: STUDENT IN AN ORGANIZED HEALTH CARE EDUCATION/TRAINING PROGRAM

## 2023-11-15 PROCEDURE — 99999 PR PBB SHADOW E&M-EST. PATIENT-LVL IV: CPT | Mod: PBBFAC,,, | Performed by: STUDENT IN AN ORGANIZED HEALTH CARE EDUCATION/TRAINING PROGRAM

## 2023-11-15 PROCEDURE — 84443 ASSAY THYROID STIM HORMONE: CPT | Performed by: STUDENT IN AN ORGANIZED HEALTH CARE EDUCATION/TRAINING PROGRAM

## 2023-11-15 PROCEDURE — 99215 OFFICE O/P EST HI 40 MIN: CPT | Mod: S$PBB,,, | Performed by: STUDENT IN AN ORGANIZED HEALTH CARE EDUCATION/TRAINING PROGRAM

## 2023-11-15 PROCEDURE — 99999 PR PBB SHADOW E&M-EST. PATIENT-LVL IV: ICD-10-PCS | Mod: PBBFAC,,, | Performed by: STUDENT IN AN ORGANIZED HEALTH CARE EDUCATION/TRAINING PROGRAM

## 2023-11-15 RX ORDER — DIAZEPAM 2 MG/1
2-4 TABLET ORAL
COMMUNITY
Start: 2023-10-11

## 2023-11-15 NOTE — ASSESSMENT & PLAN NOTE
Patient has recently been having episodes of hypotension, so he discussed with Dr. Chase (cardiologist) about 6 weeks ago. Salvatore stopped a bunch of medications at that time. I reviewed a log of home blood pressures which were mostly fine - highest systolic was in 130s range but there were two readings when diastolic was in the 50s (lowest was 53). Continue to hold any BP medications for now.

## 2023-11-15 NOTE — ASSESSMENT & PLAN NOTE
Patient is currently on two doses of levothyroxine and one dose of liothyronine. Patient's family is not sure why he was taking this. Dr. Mota's first note in 2017 states he already had a diagnosis of hypothyroidism by the time he established with us. I suspect the amiodarone may have something to do with it - either causing thyroid issues itself or simply prompting a prophylactic thyroid medication. Will get labs today. If normal, I will stop liothyronine and recheck in a few weeks.

## 2023-11-15 NOTE — PROGRESS NOTES
Name: Shashi Hassan  MRN: [id number]  : 1931    HPI  Patient presents to [facility] care. Previously seen by Dr. Mota. Family present. Primary concerns are low blood pressure and medication management. I have updated the list. Of note, Dr. Chase (cardiologist) started diazepam for the patient recently. He takes 2mg once daily.    Review of Systems   Respiratory:  Negative for shortness of breath.    Cardiovascular:  Negative for chest pain.   Musculoskeletal:  Positive for arthralgias (hands/fingers).        Patient Active Problem List   Diagnosis    Coronary artery disease involving native coronary artery of native heart without angina pectoris    Supraventricular tachycardia    Primary hypertension    Hypothyroid    Stage 3a chronic kidney disease    Mixed hyperlipidemia    Hx of colonic polyps    Sinus bradycardia    Debility    Aortic atherosclerosis    Primary osteoarthritis of both hands       Current Outpatient Medications on File Prior to Visit   Medication Sig Dispense Refill    amiodarone (PACERONE) 200 MG Tab Take by mouth. Half tablet every other day, alternate w 1 whole tablet every other day      atorvastatin (LIPITOR) 40 MG tablet TAKE 1 TABLET EVERY DAY 90 tablet 2    clopidogrel (PLAVIX) 75 mg tablet Take 75 mg by mouth once daily.      coenzyme Q10 100 mg capsule Take 100 mg by mouth 2 (two) times a day.      diazePAM (VALIUM) 2 MG tablet Take 2-4 mg by mouth.      folic acid (FOLVITE) 800 MCG Tab Take 800 mcg by mouth 2 (two) times daily.      FOLIC ACID/MULTIVIT-MIN/LUTEIN (CENTRUM SILVER ORAL) Take 1 tablet by mouth once daily.      GLUCOSAMINE HCL/CHONDR SAAVEDRA A NA (OSTEO BI-FLEX ORAL) Take 1 tablet by mouth 2 (two) times daily.      levothyroxine (SYNTHROID) 125 MCG tablet TAKE 1/2 TABLET EVERY OTHER DAY 23 tablet 0    levothyroxine (SYNTHROID) 75 MCG tablet TAKE 1 TABLET EVERY OTHER DAY ALTERNATING WITH 62.5 MCG EVERY OTHER DAY AS DIRECTED 45 tablet 3    liothyronine (CYTOMEL) 5 MCG  Tab TAKE 1 TABLET (5 MCG TOTAL) BY MOUTH ONCE DAILY. 90 tablet 3    omega-3 fatty acids-vitamin E 1,000 mg Cap Take 1 capsule by mouth 2 (two) times daily.       RANEXA 500 mg Tb12 TK 1 T PO  QD  2    tamsulosin (FLOMAX) 0.4 mg Cp24 Take 0.4 mg by mouth once daily.      varicella-zoster gE-AS01B, PF, (SHINGRIX) 50 mcg/0.5 mL injection Inject into the muscle. 1 each 1    cholecalciferol, vitamin D3, 2,000 unit Cap Take 2,000 mg by mouth every other day.      ondansetron (ZOFRAN-ODT) 4 MG TbDL Take 1 tablet (4 mg total) by mouth 3 (three) times daily as needed (nausea). (Patient not taking: Reported on 11/15/2023) 30 tablet 1    [DISCONTINUED] furosemide (LASIX) 20 MG tablet 20 mg po every Mon, Wed, and Fri (Patient not taking: Reported on 11/15/2023) 50 tablet 1    [DISCONTINUED] isosorbide mononitrate (IMDUR) 30 MG 24 hr tablet Take 15 mg by mouth once daily.       [DISCONTINUED] metoprolol tartrate (LOPRESSOR) 25 MG tablet TAKE 1 TABLET EVERY DAY (Patient not taking: Reported on 11/15/2023) 90 tablet 10    [DISCONTINUED] midodrine (PROAMATINE) 2.5 MG Tab Take 2.5 mg by mouth every morning.      [DISCONTINUED] mupirocin (BACTROBAN) 2 % ointment Apply topically 3 (three) times daily. Until healed (Patient not taking: Reported on 11/15/2023) 22 g 1    [DISCONTINUED] telmisartan (MICARDIS) 20 MG Tab TAKE 1 TABLET EVERY DAY (Patient not taking: Reported on 11/15/2023) 90 tablet 0     Current Facility-Administered Medications on File Prior to Visit   Medication Dose Route Frequency Provider Last Rate Last Admin    0.9%  NaCl infusion   Intravenous Continuous Darrell Garnica MD   Stopped at 12/04/18 8944       Past Medical History:   Diagnosis Date    Gastroesophageal reflux disease     Macular degeneration     Paroxysmal supraventricular tachycardia     Paroxysmal ventricular tachycardia     Primary cancer of skin of hand 11/2022       Past Surgical History:   Procedure Laterality Date    COLONOSCOPY  07/2005    polyps  few removed; benign; repeat in 10 yrs. Past due.    CORONARY ANGIOGRAPHY N/A 12/04/2018    Procedure: ANGIOGRAM, CORONARY ARTERY;  Surgeon: Darrell Garnica MD;  Location: [facility] CATH;  Service: Cardiology;  Laterality: N/A;    CORONARY ANGIOPLASTY WITH STENT PLACEMENT  01/2017    JONNATHAN to PDA and RCA    CORONARY ANGIOPLASTY WITH STENT PLACEMENT  04/2017    JONNATHAN to LAD    CORONARY ARTERY BYPASS GRAFT  2009    Done at [facility] in [place], [place]: FINA-LAD, SEQ SVG OM    ESOPHAGEAL DILATION N/A 08/12/2022    Procedure: DILATION, ESOPHAGUS;  Surgeon: Emmanuel Antoine MD;  Location: [facility] ENDO;  Service: Endoscopy;  Laterality: N/A;    ESOPHAGOGASTRODUODENOSCOPY N/A 08/12/2022    Procedure: EGD (ESOPHAGOGASTRODUODENOSCOPY);  Surgeon: Emmanuel Antoine MD;  Location: [facility] ENDO;  Service: Endoscopy;  Laterality: N/A;    ESOPHAGOGASTRODUODENOSCOPY N/A 12/15/2022    Procedure: EGD (ESOPHAGOGASTRODUODENOSCOPY);  Surgeon: Gwyn Red MD;  Location: [facility];  Service: Endoscopy;  Laterality: N/A;    ESOPHAGOSCOPY, WITH DILATION N/A 12/15/2022    Procedure: ESOPHAGOSCOPY, WITH DILATION;  Surgeon: Gwyn Red MD;  Location: [facility];  Service: Endoscopy;  Laterality: N/A;    PROSTATE SURGERY  2006        Family History   Problem Relation Age of Onset    Colon cancer Mother     Diabetes Mellitus Mother     Hypertension Mother     Heart disease Mother     Glaucoma Father     Heart attack Father     Stroke Father     Hypertension Father     Heart disease Father     Hypertension Brother     Crohn's disease Neg Hx     Esophageal cancer Neg Hx     Ulcerative colitis Neg Hx     Stomach cancer Neg Hx     Macular degeneration Neg Hx     Retinal detachment Neg Hx        Social History     Socioeconomic History    Marital status:    Occupational History    Occupation: retired salesman retail   Tobacco Use    Smoking status: Never    Smokeless tobacco: Never   Substance and Sexual Activity    Alcohol use: No     Drug use: No     Social Determinants of Health     Financial Resource Strain: Low Risk  (9/19/2023)    Overall Financial Resource Strain (CARDIA)     Difficulty of Paying Living Expenses: Not hard at all   Food Insecurity: No Food Insecurity (9/19/2023)    Hunger Vital Sign     Worried About Running Out of Food in the Last Year: Never true     Ran Out of Food in the Last Year: Never true   Transportation Needs: No Transportation Needs (9/19/2023)    PRAPARE - Transportation     Lack of Transportation (Medical): No     Lack of Transportation (Non-Medical): No   Physical Activity: Inactive (9/19/2023)    Exercise Vital Sign     Days of Exercise per Week: 0 days     Minutes of Exercise per Session: 0 min   Stress: Stress Concern Present (9/19/2023)    [employer] [place] [employer] - Occupational Stress Questionnaire     Feeling of Stress : Rather much   Social Connections: Moderately Integrated (9/19/2023)    Social Connection and Isolation Panel [NHANES]     Frequency of Communication with Friends and Family: More than three times a week     Frequency of Social Gatherings with Friends and Family: Once a week     Attends [employer] Services: More than 4 times per year     Active Member of Clubs or Organizations: No     Attends Club or Organization Meetings: Never     Marital Status:    Housing Stability: Low Risk  (9/19/2023)    Housing Stability Vital Sign     Unable to Pay for Housing in the Last Year: No     Number of Places Lived in the Last Year: 1     Unstable Housing in the Last Year: No       Review of patient's allergies indicates:  No Known Allergies     Health Maintenance Due   Topic Date Due    Shingles Vaccine (1 of 2) Never done    RSV Vaccine (Age 60+) (1 - 1-dose 60+ series) Never done    TETANUS VACCINE  09/19/2015    COVID-19 Vaccine (5 - 2023-24 season) 09/01/2023       Vitals:    11/15/23 1323   BP: (!) 118/58   Pulse: (!) 58        Physical Exam  Constitutional:       General: He  is not in acute distress.     Appearance: Normal appearance. He is well-developed.   HENT:      Head: Normocephalic and atraumatic.      Right Ear: External ear normal.      Left Ear: External ear normal.   Eyes:      Conjunctiva/sclera: Conjunctivae normal.   Cardiovascular:      Rate and Rhythm: Regular rhythm. Bradycardia present.      Heart sounds: No murmur heard.     No friction rub. No gallop.   Pulmonary:      Effort: Pulmonary effort is normal. No respiratory distress.      Breath sounds: No wheezing, rhonchi or rales.   Abdominal:      General: Abdomen is flat. There is no distension.   Musculoskeletal:         General: No swelling or deformity.      Right lower leg: No edema.      Left lower leg: No edema.   Skin:     General: Skin is warm and dry.      Coloration: Skin is not jaundiced.   Neurological:      Mental Status: He is alert and oriented to person, place, and time. Mental status is at baseline.   Psychiatric:         Attention and Perception: Attention and perception normal.         Mood and Affect: Mood normal.         Speech: Speech normal.         Behavior: Behavior normal. Behavior is cooperative.         Thought Content: Thought content normal.         Cognition and Memory: Cognition normal.         Judgment: Judgment normal.          1. Hypothyroidism, unspecified type  Assessment & Plan:  Patient is currently on two doses of levothyroxine and one dose of liothyronine. Patient's family is not sure why he was taking this. Dr. Mota's first note in 2017 states he already had a diagnosis of hypothyroidism by the time he established with us. I suspect the amiodarone may have something to do with it - either causing thyroid issues itself or simply prompting a prophylactic thyroid medication. Will get labs today. If normal, I will stop liothyronine and recheck in a few weeks.    Orders:  -     TSH; Future; Expected date: 11/15/2023    2. Primary hypertension  Assessment & Plan:  Patient has  recently been having episodes of hypotension, so he discussed with Dr. Chase (cardiologist) about 6 weeks ago. Salvatore stopped a bunch of medications at that time. I reviewed a log of home blood pressures which were mostly fine - highest systolic was in 130s range but there were two readings when diastolic was in the 50s (lowest was 53). Continue to hold any BP medications for now.      3. Stage 3a chronic kidney disease  Overview:  Will be establishing with Dr. Patterson.    Orders:  -     CBC Without Differential; Future; Expected date: 11/15/2023  -     Comprehensive Metabolic Panel; Future; Expected date: 11/15/2023    4. Coronary artery disease involving native coronary artery of native heart without angina pectoris  Overview:  1/2009 - CABG (FINA-LAD, SEQ SVG OM)   1/2017 - JONNATHAN to PDA, RCA  4/2017 - JONNATHAN to LAD    Follows Dr. Chase      5. Supraventricular tachycardia  Overview:  Follows Dr. Chase      6. Primary osteoarthritis of both hands  Assessment & Plan:  Encouraged diclofenac gel as needed.          Follow up in 3 months. I spent 41 minutes pre-charting, interviewing patient, performing exam, formulating and discussing plan, placing orders, and documenting.     Wayne Rogers MD  11/15/2023

## 2023-11-16 DIAGNOSIS — E03.9 HYPOTHYROIDISM, UNSPECIFIED TYPE: Primary | ICD-10-CM

## 2024-01-02 ENCOUNTER — PATIENT MESSAGE (OUTPATIENT)
Dept: FAMILY MEDICINE | Facility: CLINIC | Age: 89
End: 2024-01-02
Payer: MEDICARE

## 2024-03-24 NOTE — TELEPHONE ENCOUNTER
No care due was identified.  [facility] Embedded Care Due Messages. Reference number: [id number].   3/24/2024 9:51:11 AM CDT

## 2024-03-25 RX ORDER — TELMISARTAN 20 MG/1
20 TABLET ORAL
Qty: 90 TABLET | Refills: 3 | OUTPATIENT
Start: 2024-03-25

## 2024-03-25 NOTE — TELEPHONE ENCOUNTER
Refill Decision Note   Shashi Hassan  is requesting a refill authorization.  Brief Assessment and Rationale for Refill:  Quick Discontinue     Medication Therapy Plan:       Medication Reconciliation Completed: No   Comments:     No Care Gaps recommended.     Note composed:5:55 PM 03/25/2024

## 2024-07-02 ENCOUNTER — OFFICE VISIT (OUTPATIENT)
Dept: OPTOMETRY | Facility: CLINIC | Age: 89
End: 2024-07-02
Payer: MEDICARE

## 2024-07-02 DIAGNOSIS — H04.123 DRY EYE SYNDROME OF BILATERAL LACRIMAL GLANDS: ICD-10-CM

## 2024-07-02 DIAGNOSIS — H52.13 MYOPIA WITH ASTIGMATISM AND PRESBYOPIA, BILATERAL: ICD-10-CM

## 2024-07-02 DIAGNOSIS — Z96.1 PSEUDOPHAKIA OF BOTH EYES: ICD-10-CM

## 2024-07-02 DIAGNOSIS — H52.4 MYOPIA WITH ASTIGMATISM AND PRESBYOPIA, BILATERAL: ICD-10-CM

## 2024-07-02 DIAGNOSIS — H52.203 MYOPIA WITH ASTIGMATISM AND PRESBYOPIA, BILATERAL: ICD-10-CM

## 2024-07-02 DIAGNOSIS — H35.3130 BILATERAL NONEXUDATIVE AGE-RELATED MACULAR DEGENERATION, UNSPECIFIED STAGE: Primary | ICD-10-CM

## 2024-07-02 PROCEDURE — 99213 OFFICE O/P EST LOW 20 MIN: CPT | Mod: PBBFAC,PO

## 2024-07-02 PROCEDURE — 92015 DETERMINE REFRACTIVE STATE: CPT | Mod: ,,,

## 2024-07-02 PROCEDURE — 99999 PR PBB SHADOW E&M-EST. PATIENT-LVL III: CPT | Mod: PBBFAC,,,

## 2024-07-02 PROCEDURE — 99213 OFFICE O/P EST LOW 20 MIN: CPT | Mod: S$PBB,,,

## 2024-07-02 NOTE — PROGRESS NOTES
HPI    Pt here for annual exam, needs updated spec rx.    Pt see's Dr. Morales 6months and has appt next week. Pt sts cars look   like they are coming directly at him, thinks bifocal is to high. OS also   feels it is getting weaker.   Last edited by Paras Horton, OD on 7/2/2024  4:15 PM.            Assessment /Plan     For exam results, see Encounter Report.    Bilateral nonexudative age-related macular degeneration, unspecified stage    Dry eye syndrome of bilateral lacrimal glands    Pseudophakia of both eyes    Myopia with astigmatism and presbyopia, bilateral      Pt followed every 6 months by Dr. Morales for macular degeneration, pt denies injections. Pt has next appt tomorrow 07/03/24. Continue all follow-ups as directed with annual optom visits for spec rx and/or other needs.  SPK present OS>>OD Ed pt on nature and chronicity of dry eye. Gave OTC artificial tear recommendations to use BID-QID for relief. Monitor yearly for changes, sooner if any worsening signs or symptoms.  PCIOL OU. Stable. Monitor.  Pt had specs made online and is having difficulties with vision, states cars appear much closer than they actually are. Seg height appears too high in specs, pt likely looking through intermediate/near. Discussed spectacle options with pt and released final spec rx. Advised getting specs made in person. Ed pt on change in rx and adaptation.     *Pt has macular degeneration and is followed every 6 months by Dr. Morales. Has appt next week. No dilation today, continue care as directed.*     RTC: continue care with Dr. Morales, one year for spec rx

## 2024-08-27 ENCOUNTER — PATIENT MESSAGE (OUTPATIENT)
Dept: FAMILY MEDICINE | Facility: CLINIC | Age: 89
End: 2024-08-27
Payer: MEDICARE

## 2024-09-25 DIAGNOSIS — Z00.00 ENCOUNTER FOR MEDICARE ANNUAL WELLNESS EXAM: ICD-10-CM

## 2024-10-23 ENCOUNTER — CLINICAL SUPPORT (OUTPATIENT)
Dept: AUDIOLOGY | Facility: CLINIC | Age: 89
End: 2024-10-23
Payer: MEDICARE

## 2024-10-23 ENCOUNTER — OFFICE VISIT (OUTPATIENT)
Dept: OTOLARYNGOLOGY | Facility: CLINIC | Age: 89
End: 2024-10-23
Payer: MEDICARE

## 2024-10-23 VITALS — WEIGHT: 134.69 LBS | BODY MASS INDEX: 25.45 KG/M2

## 2024-10-23 DIAGNOSIS — H93.8X9 SENSATION OF FULLNESS IN EAR, UNSPECIFIED LATERALITY: Primary | ICD-10-CM

## 2024-10-23 DIAGNOSIS — H65.92 LEFT SEROUS OTITIS MEDIA, UNSPECIFIED CHRONICITY: Primary | ICD-10-CM

## 2024-10-23 DIAGNOSIS — H61.23 BILATERAL IMPACTED CERUMEN: ICD-10-CM

## 2024-10-23 PROCEDURE — 99213 OFFICE O/P EST LOW 20 MIN: CPT | Mod: PBBFAC,PO | Performed by: NURSE PRACTITIONER

## 2024-10-23 PROCEDURE — G0268 REMOVAL OF IMPACTED WAX MD: HCPCS | Mod: PBBFAC,PO | Performed by: NURSE PRACTITIONER

## 2024-10-23 PROCEDURE — 99999 PR PBB SHADOW E&M-EST. PATIENT-LVL III: CPT | Mod: PBBFAC,,, | Performed by: NURSE PRACTITIONER

## 2024-10-23 PROCEDURE — 92567 TYMPANOMETRY: CPT | Mod: PBBFAC,PO | Performed by: AUDIOLOGIST

## 2024-10-23 NOTE — PROGRESS NOTES
Subjective     Patient ID: Shashi Hassan is a 89 y.o. male.    Chief Complaint: Cerumen Impaction and Hearing Loss (Left ear)    HPI  Patient is new to ENT, self-referred for left aural blockage. Patient reports decrease in hearing AS in the last 6+ months. Unable to pop his left ear well.     Review of Systems   Constitutional: Negative.    HENT:  Positive for hearing loss.    Eyes: Negative.    Respiratory: Negative.     Cardiovascular: Negative.    Gastrointestinal: Negative.    Musculoskeletal: Negative.    Integumentary:  Negative.   Neurological: Negative.    Hematological: Negative.    Psychiatric/Behavioral: Negative.            Objective     Physical Exam  Vitals and nursing note reviewed.   Constitutional:       General: He is not in acute distress.     Appearance: He is well-developed. He is not ill-appearing.   HENT:      Head: Normocephalic and atraumatic.      Right Ear: Hearing, tympanic membrane, ear canal and external ear normal. No middle ear effusion. Tympanic membrane is not erythematous. Tympanic membrane has normal mobility.      Left Ear: Hearing, ear canal and external ear normal. A middle ear effusion is present. Tympanic membrane is retracted. Tympanic membrane has decreased mobility.      Nose: Nose normal.   Eyes:      General: Lids are normal. No scleral icterus.        Right eye: No discharge.         Left eye: No discharge.   Neck:      Trachea: Trachea normal. No tracheal deviation.   Cardiovascular:      Rate and Rhythm: Normal rate.   Pulmonary:      Effort: Pulmonary effort is normal. No respiratory distress.      Breath sounds: No stridor. No wheezing.   Musculoskeletal:         General: Normal range of motion.      Cervical back: Normal range of motion.   Skin:     General: Skin is warm and dry.   Neurological:      Mental Status: He is alert and oriented to person, place, and time.      Coordination: Coordination is intact.      Gait: Gait normal.   Psychiatric:          Attention and Perception: Attention normal.         Mood and Affect: Mood normal.         Speech: Speech normal.         Behavior: Behavior normal. Behavior is cooperative.     SEPARATE PROCEDURE IN OFFICE:   Procedure: Removal of impacted cerumen, bilateral   Pre Procedure Diagnosis: Cerumen Impaction   Post Procedure Diagnosis: Cerumen Impaction   Verbal informed consent in regards to risk of trauma to ear canal, ear drum or hearing, discomfort during procedure and/or inability to remove cerumen impaction in one session or unforeseen events or complications.   No anesthesia.     Procedure in detail:   Ear canal visualized bilateral with appropriate size ear speculum utilizing Operating Head Binocular Otomicroscope   Utilizing the following:  Ring curet was used AU. The impacted cerumen of the ear canals was removed atraumatically. The TM and EAC were then inspected and found to be clear of wax. See description of TMs/EACs in PE above.   Complications: No   Condition: Improved/Good         Assessment and Plan     1. Left serous otitis media, unspecified chronicity    2. Bilateral impacted cerumen      Avoid nasal sprays due to anticoagulant therapy, but attempt nasal valsalva several times per day for the next 6 weeks.  Return to clinic in 6 weeks if not significantly improved. Will need NP scope of ET orifice.            No follow-ups on file.

## 2024-10-23 NOTE — PATIENT INSTRUCTIONS
"You have fluid behind your left ear drum.  Antibiotics do not get rid of fluid.  Antibiotics are only effective at resolving infection. The fluid can sometimes remain for another 4-12 weeks following an ear infection.     There are only two ways to get rid of fluid stuck behind the ear drum:    (1) The conservative approach -- nasal sprays twice daily (Flonase & Astepro), nasal valsalva/popping, and time (sometimes several weeks).     (2) The invasive approach -- your ear drum is pierced with a sharp tool and the fluid is suctioned out by one of our ear surgeons. This procedure is done in the office and provides instant relief; however there is the risk that the ear drum may not heal properly or that the fluid may return requiring repeating the procedure or putting a drainage tube in your ear drum.     EUSTACHIAN TUBE INSTRUCTIONS:  Nasal valsalva:  Pinch nose and with closed mouth try to "pop" air into ears through the back of the nose. Attempt this several times a day. The more popping you have, the more likely the fluid will resolve.     Ponaris Nasal Emollient is used for the relief of: nasal congestion due to colds, nasal irritation, allergy exacerbations, nasal crusting. Specifically prepared iodized organic oils of pine, eucalyptus, peppermint, cajeput, and cottonseed. To order Ponaris: ask your pharmacist to order it for you or we carry it in our pharmacy downstairs on the first floor.     "

## 2024-10-23 NOTE — PROGRESS NOTES
Tympanometry completed per order from Fern Le NP.    Type As tympanogram obtained AD.  Type B tympanogram obtained AS.    Medical evaluation recommended.  Patient referred back to Fern Le NP for follow-up evaluation.

## 2024-12-04 ENCOUNTER — OFFICE VISIT (OUTPATIENT)
Dept: OTOLARYNGOLOGY | Facility: CLINIC | Age: 89
End: 2024-12-04
Payer: MEDICARE

## 2024-12-04 ENCOUNTER — CLINICAL SUPPORT (OUTPATIENT)
Dept: AUDIOLOGY | Facility: CLINIC | Age: 89
End: 2024-12-04
Payer: MEDICARE

## 2024-12-04 VITALS — WEIGHT: 134.69 LBS | BODY MASS INDEX: 25.45 KG/M2

## 2024-12-04 DIAGNOSIS — H90.3 BILATERAL SENSORINEURAL HEARING LOSS: Primary | ICD-10-CM

## 2024-12-04 PROCEDURE — 92557 COMPREHENSIVE HEARING TEST: CPT | Mod: PBBFAC,PO | Performed by: AUDIOLOGIST-HEARING AID FITTER

## 2024-12-04 PROCEDURE — 92567 TYMPANOMETRY: CPT | Mod: PBBFAC,PO | Performed by: AUDIOLOGIST-HEARING AID FITTER

## 2024-12-04 PROCEDURE — 99214 OFFICE O/P EST MOD 30 MIN: CPT | Mod: S$PBB,,, | Performed by: NURSE PRACTITIONER

## 2024-12-04 PROCEDURE — 99212 OFFICE O/P EST SF 10 MIN: CPT | Mod: PBBFAC,PO | Performed by: NURSE PRACTITIONER

## 2024-12-04 PROCEDURE — 99999 PR PBB SHADOW E&M-EST. PATIENT-LVL II: CPT | Mod: PBBFAC,,, | Performed by: NURSE PRACTITIONER

## 2024-12-04 NOTE — PROGRESS NOTES
Subjective     Patient ID: Shashi Hassan is a 89 y.o. male.    Chief Complaint: No chief complaint on file.    HPI  Patient saw me 6 weeks ago for left middle ear effusion suspected for 6+ months by patient report. I was reticent to start him on any nasal sprays due to long-term anticoagulant therapy, but encouraged patient to perform nasal valsalva several times per day for 6 weeks.  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     Review of Systems   Constitutional: Negative.    HENT:  Positive for hearing loss.    Eyes: Negative.    Respiratory: Negative.     Cardiovascular: Negative.    Gastrointestinal: Negative.    Musculoskeletal: Negative.    Integumentary:  Negative.   Neurological: Negative.    Hematological: Negative.    Psychiatric/Behavioral: Negative.            Objective     Physical Exam  Vitals and nursing note reviewed.   Constitutional:       General: He is not in acute distress.     Appearance: He is well-developed. He is not ill-appearing.   HENT:      Head: Normocephalic and atraumatic.      Right Ear: Hearing, ear canal and external ear normal. No middle ear effusion. Tympanic membrane is scarred. Tympanic membrane is not erythematous. Tympanic membrane has normal mobility.      Left Ear: Hearing, ear canal and external ear normal.  No middle ear effusion. Tympanic membrane is scarred. Tympanic membrane is not erythematous. Tympanic membrane has normal mobility.      Nose: Nose normal.   Eyes:      General: Lids are normal. No scleral icterus.        Right eye: No discharge.         Left eye: No discharge.   Neck:      Trachea: Trachea normal. No tracheal deviation.   Cardiovascular:      Rate and Rhythm: Normal rate.   Pulmonary:      Effort: Pulmonary effort is normal. No respiratory distress.      Breath sounds: No stridor. No wheezing.   Musculoskeletal:         General: Normal range of motion.      Cervical back: Normal range of motion.   Skin:     General: Skin is warm  and dry.   Neurological:      Mental Status: He is alert and oriented to person, place, and time.      Coordination: Coordination is intact.      Gait: Gait normal.   Psychiatric:         Attention and Perception: Attention normal.         Mood and Affect: Mood normal.         Speech: Speech normal.         Behavior: Behavior normal. Behavior is cooperative.          Assessment and Plan     1. Bilateral sensorineural hearing loss        Reassured TINY resolved. PATIENT IS MEDICALLY CLEARED FOR HEARING AIDS. The patient's hearing loss is not due to a temporary, correctable physical condition. There are no contraindications to hearing aid candidacy. Patient's audiogram reveals the patient is a candidate for amplification. Audiogram is reviewed in detail with the patient. The audiologist's recommendation that the patient have amplification/hearing aids is discussed and questions answered. Patient has been given information by the audiologist on how to schedule a hearing aid consultation. Patient is encouraged to wear ear protection in loud noise and return annually for hearing test. Return to clinic as needed for further ENT concerns.           No follow-ups on file.

## 2024-12-04 NOTE — PROGRESS NOTES
Shashi Hassan was seen on 12/04/2024 for an audiological evaluation. Pt was alone during today's visit. Pertinent complaints today include hearing loss. Pt confirms history of loud noise exposure in the [employer] and denies early onset of genetic family history of hearing loss. Otoscopy revealed minimal cerumen in both ears. The tympanic membrane was visualized AU prior to proceeding with the hearing testing.     Results reveal a bilateral normal sloping to profound sensorineural hearing loss.    Speech Reception Thresholds were  25 dBHL for the right ear and 25 dBHL for the left ear.    Word recognition scores were good for the right ear and good for the left ear.   Tympanograms were Type A for the right ear and Type A for the left ear.    Audiogram results were reviewed in detail with patient and all questions were answered. Results will be reviewed by the referring provider at the completion of this note. All complaints were addressed during this visit to the patient's satisfaction. Recommend binaural amplification pending medical clearance, repeat hearing testing in one year due to noise exposure and bilateral hearing protection with either muffs or in-ear protection in loud noises. Plan of care was discussed in detail with the patient, who agreed with the plan as above.

## 2024-12-17 NOTE — TELEPHONE ENCOUNTER
Spoke with pt's wife. Wife states pt is doing well, cough is coming from a sinus drip. Wife states pt's first procedure was done at [facility]. Reschedule procedure to be done at the hospital again. Wife verbalized understanding to new date & location.    Statement Selected

## 2025-02-22 DIAGNOSIS — Z00.00 ENCOUNTER FOR MEDICARE ANNUAL WELLNESS EXAM: ICD-10-CM

## 2025-03-12 ENCOUNTER — CLINICAL SUPPORT (OUTPATIENT)
Dept: AUDIOLOGY | Facility: CLINIC | Age: OVER 89
End: 2025-03-12
Payer: MEDICARE

## 2025-03-12 ENCOUNTER — OFFICE VISIT (OUTPATIENT)
Dept: OTOLARYNGOLOGY | Facility: CLINIC | Age: OVER 89
End: 2025-03-12
Payer: MEDICARE

## 2025-03-12 VITALS — BODY MASS INDEX: 25.62 KG/M2 | WEIGHT: 135.56 LBS

## 2025-03-12 DIAGNOSIS — H69.93 ETD (EUSTACHIAN TUBE DYSFUNCTION), BILATERAL: ICD-10-CM

## 2025-03-12 DIAGNOSIS — H90.A21 SENSORINEURAL HEARING LOSS (SNHL) OF RIGHT EAR WITH RESTRICTED HEARING OF LEFT EAR: ICD-10-CM

## 2025-03-12 DIAGNOSIS — H90.A32 MIXED CONDUCTIVE AND SENSORINEURAL HEARING LOSS OF LEFT EAR WITH RESTRICTED HEARING OF RIGHT EAR: Primary | ICD-10-CM

## 2025-03-12 DIAGNOSIS — H61.23 BILATERAL IMPACTED CERUMEN: ICD-10-CM

## 2025-03-12 DIAGNOSIS — H74.8X2 TYPE A-S TYMPANOGRAM, LEFT: Primary | ICD-10-CM

## 2025-03-12 PROCEDURE — 99213 OFFICE O/P EST LOW 20 MIN: CPT | Mod: PBBFAC,PO | Performed by: NURSE PRACTITIONER

## 2025-03-12 PROCEDURE — 69210 REMOVE IMPACTED EAR WAX UNI: CPT | Mod: 50,PBBFAC,PO | Performed by: NURSE PRACTITIONER

## 2025-03-12 PROCEDURE — 92567 TYMPANOMETRY: CPT | Mod: PBBFAC,PO

## 2025-03-12 PROCEDURE — 99999 PR PBB SHADOW E&M-EST. PATIENT-LVL III: CPT | Mod: PBBFAC,,, | Performed by: NURSE PRACTITIONER

## 2025-03-12 RX ORDER — FLUTICASONE PROPIONATE 50 MCG
1 SPRAY, SUSPENSION (ML) NASAL 2 TIMES DAILY
Qty: 16 G | Refills: 12 | Status: SHIPPED | OUTPATIENT
Start: 2025-03-12 | End: 2026-03-12

## 2025-03-12 NOTE — PROGRESS NOTES
Shashi Hassan was seen 03/12/2025 for tympanometry per order from Fern Le, MADHAVI, ENT. Pt was alone during today's visit. Results indicate Type A for the right ear and Type As for the left ear.    Results will be reviewed by the referring provider following this encounter. All complaints were addressed during this visit to the patient's satisfaction. Plan of care was discussed in detail with the patient, who agreed with the plan as above.

## 2025-03-12 NOTE — PROGRESS NOTES
"Subjective     Patient ID: Shashi Hassan is a 89 y.o. male.    Chief Complaint: Cerumen Impaction (Possible cleaning) and Hearing Loss    HPI  Patient saw me 3 months ago for ear cleaning. Longstanding h/o tympanosclerosis. He has been encouraged to perform nasal valsalva several times per day. 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 He recently had an audiogram at [facility] that showed mixed HL AS (unable to determine tymps).     Review of Systems   Constitutional: Negative.    HENT:  Positive for hearing loss.    Eyes: Negative.    Respiratory: Negative.     Cardiovascular: Negative.    Gastrointestinal: Negative.    Musculoskeletal: Negative.    Integumentary:  Negative.   Neurological: Negative.    Hematological: Negative.    Psychiatric/Behavioral: Negative.            Objective     Physical Exam  Vitals and nursing note reviewed.   Constitutional:       General: He is not in acute distress.     Appearance: He is well-developed. He is not ill-appearing.   HENT:      Head: Normocephalic and atraumatic.      Right Ear: Hearing, ear canal and external ear normal. No middle ear effusion. Tympanic membrane is scarred. Tympanic membrane is not erythematous. Tympanic membrane has normal mobility.      Left Ear: Hearing, ear canal and external ear normal.  No middle ear effusion. Tympanic membrane is scarred. Tympanic membrane is not erythematous. Tympanic membrane has decreased mobility.      Ears:      Comments: RIGHT: Type "A" tympanogram consistent with well-pneumatized mesotympanum and absence of middle ear effusion  LEFT: Type "As" tympanogram consistent with limited TM mobility     Nose: Nose normal.   Eyes:      General: Lids are normal. No scleral icterus.        Right eye: No discharge.         Left eye: No discharge.   Neck:      Trachea: Trachea normal. No tracheal deviation.   Cardiovascular:      Rate and Rhythm: Normal rate.   Pulmonary:      Effort: Pulmonary effort is normal. No " respiratory distress.      Breath sounds: No stridor. No wheezing.   Musculoskeletal:         General: Normal range of motion.      Cervical back: Normal range of motion.   Skin:     General: Skin is warm and dry.   Neurological:      Mental Status: He is alert and oriented to person, place, and time.      Coordination: Coordination is intact.      Gait: Gait normal.   Psychiatric:         Attention and Perception: Attention normal.         Mood and Affect: Mood normal.         Speech: Speech normal.         Behavior: Behavior normal. Behavior is cooperative.     SEPARATE PROCEDURE IN OFFICE:   Procedure: Removal of impacted cerumen, bilateral   Pre Procedure Diagnosis: Cerumen Impaction   Post Procedure Diagnosis: Cerumen Impaction   Verbal informed consent in regards to risk of trauma to ear canal, ear drum or hearing, discomfort during procedure and/or inability to remove cerumen impaction in one session or unforeseen events or complications.   No anesthesia.     Procedure in detail:   Ear canal visualized bilateral with appropriate size ear speculum utilizing Operating Head Binocular Otomicroscope   Utilizing the following:  Ring curet, delicate alligator forceps, and/or suction cannula was used. The impacted cerumen of the ear canals was removed atraumatically. The TM and EAC were then inspected and found to be clear of wax. See description of TMs/EACs in PE above.   Complications: No   Condition: Improved/Good       Assessment and Plan     1. Mixed conductive and sensorineural hearing loss of left ear with restricted hearing of right ear  -     fluticasone propionate (FLONASE) 50 mcg/actuation nasal spray; 1 spray (50 mcg total) by Each Nostril route 2 (two) times daily.  Dispense: 16 g; Refill: 12    2. Sensorineural hearing loss (SNHL) of right ear with restricted hearing of left ear    3. Bilateral impacted cerumen    4. ETD (Eustachian tube dysfunction), bilateral        PATIENT IS MEDICALLY CLEARED FOR  "HEARING AIDS. Patient given Flonase BID and encouraged to perform nasal valsalva several times per day for his left-sided ETD. Tympanometry shows limited mobility of his left TM (type "As" so could be some middle ear fluid). There are no contraindications to hearing aid candidacy. Patient's audiogram reveals the patient is a candidate for amplification. The audiologist's recommendation that the patient have amplification/hearing aids is discussed and questions answered. Patient is encouraged to wear ear protection in loud noise and return annually for hearing test. Return to clinic as needed for further ENT concerns.           No follow-ups on file.          "

## 2025-06-11 ENCOUNTER — PROCEDURE VISIT (OUTPATIENT)
Dept: OTOLARYNGOLOGY | Facility: CLINIC | Age: OVER 89
End: 2025-06-11
Payer: MEDICARE

## 2025-06-11 VITALS — WEIGHT: 135.56 LBS | BODY MASS INDEX: 25.59 KG/M2 | HEIGHT: 61 IN

## 2025-06-11 DIAGNOSIS — H61.23 BILATERAL IMPACTED CERUMEN: Primary | ICD-10-CM

## 2025-06-11 PROCEDURE — 69210 REMOVE IMPACTED EAR WAX UNI: CPT | Mod: S$PBB,,, | Performed by: NURSE PRACTITIONER

## 2025-06-11 PROCEDURE — 99499 UNLISTED E&M SERVICE: CPT | Mod: 25,S$PBB,, | Performed by: NURSE PRACTITIONER

## 2025-06-11 PROCEDURE — 69210 REMOVE IMPACTED EAR WAX UNI: CPT | Mod: PBBFAC,PO | Performed by: NURSE PRACTITIONER

## 2025-06-11 NOTE — PROCEDURES
Ear Cerumen Removal    Date/Time: 6/11/2025 8:45 AM    Performed by: Fern Le NP  Authorized by: Fern Le NP    Consent Done?:  Not Needed    Local anesthetic:  None  Location details:  Both ears  Procedure type: curette    Cerumen  Removal Results:  Cerumen completely removed  Patient tolerance:  Patient tolerated the procedure well with no immediate complications

## 2025-08-19 ENCOUNTER — PATIENT MESSAGE (OUTPATIENT)
Dept: ADMINISTRATIVE | Facility: HOSPITAL | Age: OVER 89
End: 2025-08-19
Payer: MEDICARE